# Patient Record
Sex: MALE | Race: WHITE | NOT HISPANIC OR LATINO | Employment: UNEMPLOYED | URBAN - METROPOLITAN AREA
[De-identification: names, ages, dates, MRNs, and addresses within clinical notes are randomized per-mention and may not be internally consistent; named-entity substitution may affect disease eponyms.]

---

## 2020-01-23 ENCOUNTER — TELEPHONE (OUTPATIENT)
Dept: CARDIOLOGY | Facility: CLINIC | Age: 40
End: 2020-01-23

## 2020-01-23 ENCOUNTER — OFFICE VISIT (OUTPATIENT)
Dept: CARDIOLOGY | Facility: CLINIC | Age: 40
End: 2020-01-23

## 2020-01-23 VITALS
BODY MASS INDEX: 31.5 KG/M2 | HEART RATE: 58 BPM | DIASTOLIC BLOOD PRESSURE: 92 MMHG | WEIGHT: 225 LBS | SYSTOLIC BLOOD PRESSURE: 152 MMHG | HEIGHT: 71 IN

## 2020-01-23 DIAGNOSIS — I10 ESSENTIAL HYPERTENSION: ICD-10-CM

## 2020-01-23 DIAGNOSIS — R07.2 PRECORDIAL PAIN: Primary | ICD-10-CM

## 2020-01-23 PROCEDURE — 93000 ELECTROCARDIOGRAM COMPLETE: CPT | Performed by: INTERNAL MEDICINE

## 2020-01-23 PROCEDURE — 99204 OFFICE O/P NEW MOD 45 MIN: CPT | Performed by: INTERNAL MEDICINE

## 2020-01-23 RX ORDER — PREDNISONE 10 MG/1
10 TABLET ORAL
COMMUNITY
Start: 2020-01-22 | End: 2021-02-15

## 2020-01-23 RX ORDER — AZELASTINE 1 MG/ML
SPRAY, METERED NASAL
COMMUNITY
Start: 2020-01-22 | End: 2021-02-15

## 2020-01-23 RX ORDER — ASPIRIN 81 MG/1
81 TABLET ORAL DAILY
COMMUNITY
End: 2021-02-15

## 2020-01-23 RX ORDER — SUCRALFATE 1 G/1
1 TABLET ORAL 4 TIMES DAILY
COMMUNITY
Start: 2020-01-19 | End: 2021-01-18

## 2020-01-23 RX ORDER — NADOLOL 20 MG/1
20 TABLET ORAL DAILY
COMMUNITY
Start: 2020-01-22 | End: 2021-03-19 | Stop reason: SDUPTHER

## 2020-01-23 RX ORDER — PANTOPRAZOLE SODIUM 40 MG/1
40 TABLET, DELAYED RELEASE ORAL DAILY
COMMUNITY
Start: 2020-01-19 | End: 2021-02-15

## 2020-01-23 RX ORDER — OXYMETAZOLINE HYDROCHLORIDE 0.05 G/100ML
2 SPRAY NASAL 2 TIMES DAILY
COMMUNITY
End: 2021-02-15

## 2020-01-23 NOTE — PROGRESS NOTES
Date of Office Visit: 2020  Encounter Provider: Harriet Chin MD  Place of Service: [facility] CARDIOLOGY  Patient Name: Arsenio Beard  :1980    Chief complaint  Consult requested by Dr. Phillips for evaluation of chest pain, shortness of breath.    History of Present Illness  Patient is a 39-year-old gentleman history of hypertension, GE reflux disease.  He has no known cardiac disease in fact has had had any cardiac care though had been incarcerated for 13 years until recently.  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 he was seen at [facility] emergency room on  with midsternal chest discomfort which was radiating to his neck associated with right arm numbness.  He was hypertensive at the time.  He describes constant chest pain, right arm pain and radiation to his neck for 3 days prior to admission.  The left arm pain had occurred after he lifted some activity at objects.  D-dimer was negative.  It was not felt his pain is cardiac in nature more likely GI in onset and was given Carafate and omeprazole.  He was subsequently sent home and saw Dr. Phillips yesterday and felt to have migraine headaches for which she was given steroids as well as Corgard.  He also complained of chest pain which is felt to be atypical and subsequent for further evaluation.  He states that the chest pain has improved and only intermittently worse with activity.  His right arm pain and paresthesias have improved slightly.      Past Medical History:   Diagnosis Date   • Atypical chest pain    • GERD (gastroesophageal reflux disease)    • Hypertension      History reviewed. No pertinent surgical history.  Outpatient Medications Prior to Visit   Medication Sig Dispense Refill   • aspirin 81 MG EC tablet Take 162 mg by mouth Daily.     • azelastine (ASTELIN) 0.1 % nasal spray      • nadolol (CORGARD) 20 MG tablet Take 20 mg by mouth Daily.     •  oxymetazoline (AFRIN) 0.05 % nasal spray 2 sprays into the nostril(s) as directed by provider 2 (Two) Times a Day.     • pantoprazole (PROTONIX) 40 MG EC tablet Take 40 mg by mouth Daily.     • predniSONE (DELTASONE) 10 MG tablet Take 10 mg by mouth 4 (Four) Times a Day.     • sucralfate (CARAFATE) 1 g tablet Take 1 g by mouth 4 (Four) Times a Day.       No facility-administered medications prior to visit.        Allergies as of 01/23/2020   • (No Known Allergies)     Social History     Socioeconomic History   • Marital status: Single     Spouse name: Not on file   • Number of children: Not on file   • Years of education: Not on file   • Highest education level: Not on file   Tobacco Use   • Smoking status: Never Smoker   • Smokeless tobacco: Never Used     Family History   Problem Relation Age of Onset   • Heart disease Maternal Grandmother    • Heart attack Maternal Grandmother      Review of Systems   Constitution: Positive for malaise/fatigue. Negative for fever, weight gain and weight loss.   HENT: Negative for ear pain, hearing loss, nosebleeds and sore throat.    Eyes: Negative for double vision, pain, vision loss in left eye and vision loss in right eye.   Cardiovascular:        See history of present illness.   Respiratory: Positive for shortness of breath. Negative for cough, sleep disturbances due to breathing, snoring and wheezing.    Endocrine: Negative for cold intolerance, heat intolerance and polyuria.   Skin: Negative for itching, poor wound healing and rash.   Musculoskeletal: Negative for joint pain, joint swelling and myalgias.   Gastrointestinal: Negative for abdominal pain, diarrhea, hematochezia, nausea and vomiting.   Genitourinary: Negative for hematuria and hesitancy.   Neurological: Positive for excessive daytime sleepiness and light-headedness. Negative for numbness, paresthesias and seizures.   Psychiatric/Behavioral: Negative for depression. The patient is not nervous/anxious.      "    Objective:     Vitals:    01/23/20 1024 01/23/20 1025   BP: 150/92 152/92   BP Location: Right arm Left arm   Pulse: 58    Weight: 102 kg (225 lb)    Height: 180.3 cm (71\")      Body mass index is 31.38 kg/m².    Physical Exam   Constitutional: He is oriented to person, place, and time. He appears well-developed and well-nourished.   Obese   HENT:   Head: Normocephalic.   Nose: Nose normal.   Mouth/Throat: Oropharynx is clear and moist.   Eyes: Pupils are equal, round, and reactive to light. Conjunctivae and EOM are normal. Right eye exhibits no discharge. No scleral icterus.   Neck: Normal range of motion. Neck supple. No JVD present. No thyromegaly present.   Cardiovascular: Normal rate, regular rhythm, normal heart sounds and intact distal pulses. Exam reveals no gallop and no friction rub.   No murmur heard.  Pulses:       Carotid pulses are 2+ on the right side, and 2+ on the left side.       Radial pulses are 2+ on the right side, and 2+ on the left side.        Femoral pulses are 0 on the right side, and 0 on the left side.       Popliteal pulses are 2+ on the right side, and 2+ on the left side.        Dorsalis pedis pulses are 2+ on the right side, and 2+ on the left side.        Posterior tibial pulses are 2+ on the right side, and 2+ on the left side.   Pulmonary/Chest: Effort normal and breath sounds normal. No respiratory distress. He has no wheezes. He has no rales.   Abdominal: Soft. Bowel sounds are normal. He exhibits no distension. There is no hepatosplenomegaly. There is no tenderness. There is no rebound.   Musculoskeletal: Normal range of motion. He exhibits no edema or tenderness.   Neurological: He is alert and oriented to person, place, and time.   Skin: Skin is warm and dry. No rash noted. No erythema.   Psychiatric: He has a normal mood and affect. His behavior is normal. Judgment and thought content normal.   Vitals reviewed.    Lab Review:     ECG 12 Lead  Date/Time: 1/23/2020 10:26 " AM  Performed by: Harriet Chin MD  Authorized by: Harriet Chin MD   Comparison: not compared with previous ECG   Rhythm: sinus rhythm  Conduction: 1st degree AV block    Clinical impression: abnormal EKG          Assessment:       Diagnosis Plan   1. Precordial pain  ECG 12 Lead    Treadmill Stress Test    XR Chest 2 View   2. Essential hypertension       Plan:       1.  Chest pain.  Fairly constant and more likely musculoskeletal in nature.  Once blood pressure is better controlled we will have him return for treadmill stress test.  Will also check a chest x-ray.  He also consumes large amounts of caffeinated beverages and I strongly urged him to discontinue this.  Certainly this clinically hypertension and esophagitis  2.  Right arm pain.  Likely radicular in nature.  Have him follow-up with Dr. Phillips in regards to this.  May need further neurologic evaluation.  3.  Hypertension.  He has not been following a low-salt diet.  I reviewed this with him.  He will pursue this and start a regular exercise regimen after stress test has been completed and is hopefully normal.  In addition I told him to change his diet significantly certainly the 3 L of Mountain Dew a day is contributing.  Once his blood pressure control plan a Trimox stress test next week.  4.  Headaches/migraines.  Recommendations per Dr. Phillips       Your medication list           Accurate as of January 23, 2020 11:59 PM. If you have any questions, ask your nurse or doctor.               CONTINUE taking these medications      Instructions Last Dose Given Next Dose Due   aspirin 81 MG EC tablet      Take 162 mg by mouth Daily.       azelastine 0.1 % nasal spray  Commonly known as:  ASTELIN           nadolol 20 MG tablet  Commonly known as:  CORGARD      Take 20 mg by mouth Daily.       oxymetazoline 0.05 % nasal spray  Commonly known as:  AFRIN      2 sprays into the nostril(s) as directed by provider 2 (Two) Times a Day.       pantoprazole 40 MG EC  tablet  Commonly known as:  PROTONIX      Take 40 mg by mouth Daily.       predniSONE 10 MG tablet  Commonly known as:  DELTASONE      Take 10 mg by mouth 4 (Four) Times a Day.       sucralfate 1 g tablet  Commonly known as:  CARAFATE      Take 1 g by mouth 4 (Four) Times a Day.              Patient is no longer taking -.  I corrected the med list to reflect this.  I did not stop these medications.    Dictated utilizing Dragon dictation

## 2020-01-23 NOTE — TELEPHONE ENCOUNTER
These let patient know I did want him to get a chest x-ray as we discussed.  It was not done during the recent ER visit. ten

## 2020-01-26 PROBLEM — I10 ESSENTIAL HYPERTENSION: Status: ACTIVE | Noted: 2020-01-26

## 2020-01-26 PROBLEM — R07.2 PRECORDIAL PAIN: Status: ACTIVE | Noted: 2020-01-26

## 2020-01-29 ENCOUNTER — HOSPITAL ENCOUNTER (OUTPATIENT)
Dept: CARDIOLOGY | Facility: HOSPITAL | Age: 40
Discharge: HOME | End: 2020-01-29
Admitting: INTERNAL MEDICINE

## 2020-01-29 ENCOUNTER — HOSPITAL ENCOUNTER (OUTPATIENT)
Dept: GENERAL RADIOLOGY | Facility: HOSPITAL | Age: 40
Discharge: HOME | End: 2020-01-29

## 2020-01-29 DIAGNOSIS — R07.2 PRECORDIAL PAIN: ICD-10-CM

## 2020-01-29 LAB
BH CV STRESS BP STAGE 1: NORMAL
BH CV STRESS BP STAGE 2: NORMAL
BH CV STRESS BP STAGE 3: NORMAL
BH CV STRESS BP STAGE 4: NORMAL
BH CV STRESS DURATION MIN STAGE 1: 3
BH CV STRESS DURATION MIN STAGE 2: 3
BH CV STRESS DURATION MIN STAGE 3: 3
BH CV STRESS DURATION MIN STAGE 4: 3
BH CV STRESS DURATION MIN STAGE 5: 1
BH CV STRESS DURATION SEC STAGE 1: 0
BH CV STRESS DURATION SEC STAGE 2: 0
BH CV STRESS DURATION SEC STAGE 3: 0
BH CV STRESS DURATION SEC STAGE 4: 0
BH CV STRESS DURATION SEC STAGE 5: 30
BH CV STRESS GRADE STAGE 1: 10
BH CV STRESS GRADE STAGE 2: 12
BH CV STRESS GRADE STAGE 3: 14
BH CV STRESS GRADE STAGE 4: 16
BH CV STRESS GRADE STAGE 5: 18
BH CV STRESS HR STAGE 1: 94
BH CV STRESS HR STAGE 2: 107
BH CV STRESS HR STAGE 3: 121
BH CV STRESS HR STAGE 4: 145
BH CV STRESS HR STAGE 5: 154
BH CV STRESS METS STAGE 1: 5
BH CV STRESS METS STAGE 2: 7.5
BH CV STRESS METS STAGE 3: 10
BH CV STRESS METS STAGE 4: 13.5
BH CV STRESS METS STAGE 5: 15
BH CV STRESS PROTOCOL 1: NORMAL
BH CV STRESS RECOVERY BP: NORMAL MMHG
BH CV STRESS RECOVERY HR: 94 BPM
BH CV STRESS SPEED STAGE 1: 1.7
BH CV STRESS SPEED STAGE 2: 2.5
BH CV STRESS SPEED STAGE 3: 3.4
BH CV STRESS SPEED STAGE 4: 4.2
BH CV STRESS SPEED STAGE 5: 5
BH CV STRESS STAGE 1: 1
BH CV STRESS STAGE 2: 2
BH CV STRESS STAGE 3: 3
BH CV STRESS STAGE 4: 4
BH CV STRESS STAGE 5: 5
MAXIMAL PREDICTED HEART RATE: 179 BPM
PERCENT MAX PREDICTED HR: 86.03 %
STRESS BASELINE BP: NORMAL MMHG
STRESS BASELINE HR: 63 BPM
STRESS PERCENT HR: 101 %
STRESS POST ESTIMATED WORKLOAD: 14 METS
STRESS POST EXERCISE DUR MIN: 13 MIN
STRESS POST EXERCISE DUR SEC: 30 SEC
STRESS POST PEAK BP: NORMAL MMHG
STRESS POST PEAK HR: 154 BPM
STRESS TARGET HR: 152 BPM

## 2020-01-29 PROCEDURE — 93017 CV STRESS TEST TRACING ONLY: CPT

## 2020-01-29 PROCEDURE — 93018 CV STRESS TEST I&R ONLY: CPT | Performed by: INTERNAL MEDICINE

## 2020-01-29 PROCEDURE — 71046 X-RAY EXAM CHEST 2 VIEWS: CPT

## 2020-01-29 PROCEDURE — 93016 CV STRESS TEST SUPVJ ONLY: CPT | Performed by: INTERNAL MEDICINE

## 2020-01-30 ENCOUNTER — TELEPHONE (OUTPATIENT)
Dept: CARDIOLOGY | Facility: CLINIC | Age: 40
End: 2020-01-30

## 2020-01-31 ENCOUNTER — PATIENT MESSAGE (OUTPATIENT)
Dept: CARDIOLOGY | Facility: CLINIC | Age: 40
End: 2020-01-31

## 2020-01-31 DIAGNOSIS — Z51.81 THERAPEUTIC DRUG MONITORING: ICD-10-CM

## 2020-01-31 DIAGNOSIS — I10 ESSENTIAL HYPERTENSION: Primary | ICD-10-CM

## 2020-01-31 NOTE — TELEPHONE ENCOUNTER
Notified patient of results and recommendations. He verbalized understanding. He is going to send his B/P readings in through [employer]. I sent him the email sign up link.    Mayra Lopez RN  Triage [facility] [place]

## 2020-01-31 NOTE — TELEPHONE ENCOUNTER
Please let the patient know x-ray and stress test are normal.  And have him see PCP regarding alternative possible GI etiology of chest pain.  Call if blood pressure remains greater than 130/80 mmHg . ten

## 2020-02-04 ENCOUNTER — TELEPHONE (OUTPATIENT)
Dept: CARDIOLOGY | Facility: CLINIC | Age: 40
End: 2020-02-04

## 2020-02-04 RX ORDER — LOSARTAN POTASSIUM 25 MG/1
25 TABLET ORAL DAILY
Qty: 90 TABLET | Refills: 1 | Status: SHIPPED | OUTPATIENT
Start: 2020-02-04 | End: 2020-02-05

## 2020-02-04 NOTE — TELEPHONE ENCOUNTER
Usually hypertension is essential hypertension which is from the arteries just getting stiffer with proteins in the vessel walls changing over time.  Of course not exercising, consuming a lot of caffeine and eating a lot of salt will also be big players and causing hypertension.  He needs to modify all of this.  There is also a genetic predisposition.  Sometimes there is also sleep apnea and other secondary causes.  At this point he needs to make lifestyle changes with diet and exercise.  If he is snoring may need to consider sleep study.  Please see if he snores has daytime sleepiness or nightmares or night terrors.  In which case will need a sleep study.  At this point, have him start losartan 25 mg a day in addition to his current regimen.  Check a BMP in 1 week.

## 2020-02-04 NOTE — TELEPHONE ENCOUNTER
Sent via [employer].  Rx sent and note to pt to verify if he wants to have lab at the [facility].  Pt has transportation issues.

## 2020-02-05 ENCOUNTER — TELEPHONE (OUTPATIENT)
Dept: CARDIOLOGY | Facility: CLINIC | Age: 40
End: 2020-02-05

## 2020-02-05 RX ORDER — LOSARTAN POTASSIUM 25 MG/1
25 TABLET ORAL DAILY
Qty: 30 TABLET | Refills: 1 | Status: SHIPPED | OUTPATIENT
Start: 2020-02-05 | End: 2021-02-15 | Stop reason: ALTCHOICE

## 2020-02-05 NOTE — TELEPHONE ENCOUNTER
Spoke with pt. Let him know that I sent his losartan to the other pharmacy. Went over indications for losartan. Pt teaching completed about lifestyle changes that can help lower B/P. He verbalized understanding.    Thank you!    Mayra Lopez RN  [place] [facility]

## 2020-02-05 NOTE — TELEPHONE ENCOUNTER
Pt called and left voicemail to have his losartan sent to another pharmacy. He would like this sent to the [facility] in [facility]. He said he would be picking up Saturday    He can be reached at [phone] if there are any questions    Thanks   SW

## 2020-02-06 DIAGNOSIS — I10 ESSENTIAL HYPERTENSION: Primary | ICD-10-CM

## 2020-02-06 DIAGNOSIS — R06.83 SNORING: ICD-10-CM

## 2020-02-06 DIAGNOSIS — R40.0 DAYTIME SOMNOLENCE: ICD-10-CM

## 2020-02-06 NOTE — TELEPHONE ENCOUNTER
I think given this information best if he check for sleep apnea especially as sleep apnea can only cause hypertension but also migraine headaches, and if he has it treating sleep apnea will help with both.  Order was placed for home sleep study.  Please let him know this and arrange. ten

## 2020-02-28 ENCOUNTER — HOSPITAL ENCOUNTER (OUTPATIENT)
Dept: SLEEP MEDICINE | Facility: HOSPITAL | Age: 40
Discharge: HOME | End: 2020-02-28
Admitting: INTERNAL MEDICINE

## 2020-02-28 PROCEDURE — 95806 SLEEP STUDY UNATT&RESP EFFT: CPT

## 2020-02-28 PROCEDURE — 95806 SLEEP STUDY UNATT&RESP EFFT: CPT | Performed by: INTERNAL MEDICINE

## 2020-03-09 ENCOUNTER — TELEPHONE (OUTPATIENT)
Dept: SLEEP MEDICINE | Facility: HOSPITAL | Age: 40
End: 2020-03-09

## 2020-03-09 NOTE — TELEPHONE ENCOUNTER
Called to let pt know he has no karishma and to f/u with cardiology or Dr. Phillips per Dr. Kennedy. CV

## 2020-03-12 ENCOUNTER — TELEPHONE (OUTPATIENT)
Dept: CARDIOLOGY | Facility: CLINIC | Age: 40
End: 2020-03-12

## 2020-03-16 NOTE — TELEPHONE ENCOUNTER
1120 Reviewed Sleep Study results with patient.  DWAINE Araujo RN  Called patient this am, left message for patient to return call.  Quin Araujo RN.

## 2020-05-07 ENCOUNTER — TELEPHONE (OUTPATIENT)
Dept: CARDIOLOGY | Facility: CLINIC | Age: 40
End: 2020-05-07

## 2020-06-10 NOTE — TELEPHONE ENCOUNTER
This is the response I got back from the pt.     I greatly appreciate your help, patience, kindness, and care. Since you guys prescribed me the medications you did I have not refilled them. I took them until they ran out (30 day supply). I couldn’t get them refilled so I quit taking them.     My primary care doctor has not prescribed them. However, he has me on nadol, Flonase, and a muscle relaxer for my back (generic Flexeril). If you deem it important to take the pills you prescribed then I will need to you to prescribe them and re-prescribe the as often as need be.   Again. Thank you for your help, love, kindness, care, and patience with me.       Is there any information you want me to get other then his vitals?

## 2020-08-09 ENCOUNTER — PATIENT MESSAGE (OUTPATIENT)
Dept: CARDIOLOGY | Facility: CLINIC | Age: 40
End: 2020-08-09

## 2020-08-10 ENCOUNTER — TELEPHONE (OUTPATIENT)
Dept: CARDIOLOGY | Facility: CLINIC | Age: 40
End: 2020-08-10

## 2020-08-10 NOTE — TELEPHONE ENCOUNTER
----- Message from Arsenio Chirag sent at 8/9/2020  5:03 PM EDT -----  Regarding: Test Results Question  Contact: [phone]  So at this point what are you thinking??? I need to lose weight and stop drinking so much caffeine. However, what is the root of the high blood pressure issue???    That said I was supposed to see a GI specialist but they canceled due to the covid. They said they would reschedule when the open again. So I KNOW there are GI issue that need to be addressed and have worsened since I did the treadmill test.     What do these labs show??? What are your thoughts at this time???? Do you need me to schedule an appointment to come in for a visit??? If so give me the options of dates and times and I will come in.    Thank you for your help, time and love of [place] you are showing me!!! I greatly look forward to hearing from you.

## 2020-08-10 NOTE — TELEPHONE ENCOUNTER
Please let him know that the kidney tests in May were fine.  Looks like both Carol and Anne have been trying to reach him to arrange an appointment with Mayra as I had previously recommended.  Please have him come in this week to see Mayra to address hypertension further.  Yes weight loss and avoidance of alcohol and caffeine will help hypertension which most of the time is genetic and idiopathic.  Other factors include sleep apnea.  There are also secondary causes but these are much less common.  He needs to be evaluated.  Please have him come in this week

## 2020-08-11 NOTE — TELEPHONE ENCOUNTER
Sent via [employer].    Scheduling- Please call pt to get him for appt with ANURADHA Nunez this week.

## 2020-08-13 ENCOUNTER — TELEPHONE (OUTPATIENT)
Dept: CARDIOLOGY | Facility: CLINIC | Age: 40
End: 2020-08-13

## 2021-02-15 ENCOUNTER — OFFICE VISIT (OUTPATIENT)
Dept: SURGERY | Facility: CLINIC | Age: 41
End: 2021-02-15

## 2021-02-15 VITALS — WEIGHT: 245 LBS | HEIGHT: 72 IN | BODY MASS INDEX: 33.18 KG/M2

## 2021-02-15 DIAGNOSIS — K40.90 RIGHT INGUINAL HERNIA: Primary | ICD-10-CM

## 2021-02-15 PROCEDURE — 99203 OFFICE O/P NEW LOW 30 MIN: CPT | Performed by: SURGERY

## 2021-02-15 RX ORDER — FLUTICASONE PROPIONATE 50 MCG
2 SPRAY, SUSPENSION (ML) NASAL DAILY
COMMUNITY

## 2021-02-15 RX ORDER — CEFAZOLIN SODIUM 2 G/100ML
2 INJECTION, SOLUTION INTRAVENOUS ONCE
Status: CANCELLED | OUTPATIENT
Start: 2021-02-23 | End: 2021-02-15

## 2021-02-15 RX ORDER — SODIUM CHLORIDE 0.9 % (FLUSH) 0.9 %
3 SYRINGE (ML) INJECTION EVERY 12 HOURS SCHEDULED
Status: CANCELLED | OUTPATIENT
Start: 2021-02-23

## 2021-02-15 RX ORDER — SODIUM CHLORIDE 0.9 % (FLUSH) 0.9 %
10 SYRINGE (ML) INJECTION AS NEEDED
Status: CANCELLED | OUTPATIENT
Start: 2021-02-23

## 2021-02-15 RX ORDER — OMEPRAZOLE 20 MG/1
40 CAPSULE, DELAYED RELEASE ORAL DAILY
COMMUNITY
End: 2021-09-02

## 2021-02-15 RX ORDER — METHYLPREDNISOLONE 4 MG/1
4 TABLET ORAL DAILY
COMMUNITY
End: 2021-03-19

## 2021-02-15 RX ORDER — IBUPROFEN 600 MG/1
600 TABLET ORAL EVERY 8 HOURS PRN
COMMUNITY
End: 2021-09-02

## 2021-02-15 RX ORDER — METHOCARBAMOL 750 MG/1
750 TABLET, FILM COATED ORAL EVERY 12 HOURS
COMMUNITY
End: 2021-02-19

## 2021-02-15 NOTE — PROGRESS NOTES
Cc: Right groin pain    History of presenting illness:   This is a very nice, generally healthy 40-year-old gentleman who says that around 2 weeks ago he was lifting a heavy object at work and had the sudden onset of some back pain as well as right groin and testicle pain.  The pain has been persistent since that time, but is worse when he is on his feet or straining.  There has been no associated urinary or bowel changes.  No nausea or vomiting.  The pain radiates into his testicle and thigh.    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    Family History: Negative for known colorectal cancer    Review of Systems:  Constitutional: Negative for fever, chills, change in weight  Neck: no swollen glands or dysphagia or odynophagia  Respiratory: negative for SOB, cough, hemoptysis or wheezing  Cardiovascular: negative for chest pain, palpitations or peripheral edema  Gastrointestinal: Positive for right groin pain, negative for constipation, positive for reflux      Physical Exam:  BMI: Body mass index 33.7  General: alert and oriented, appropriate, no acute distress  Eyes: No scleral icterus, extraocular movements are intact  Neck: Supple without lymphadenopathy or thyromegaly, trachea is in the midline  Respiratory: There is good bilateral chest expansion, no use of accessory muscles is noted  Cardiovascular: No jugular venous distention or peripheral edema is seen  Gastrointestinal: Soft, benign, no ventral or umbilical hernia is felt.  No guarding or rebound.  Genitourinary: Normal male external genitalia with testes descended bilaterally.  In the right groin there is tenderness to palpation.  With examination of the external  inguinal ring there is a bulge with straining, easily reducible.  Left groin has a normal exam.    Laboratory data: No recent relevant data    Imaging data: No recent relevant data      Assessment and plan:   -Right groin pain and right inguinal hernia.  Suspect indirect.  No left inguinal hernia is felt.  I have recommended proceeding with laparoscopic da Vanessa robot-assisted right inguinal hernia repair with mesh.    Risks associated with the procedure are noted to include, but not be limited to, bleeding, infection, injury to small or large intestine, major vascular structures, the bladder or ureters.  Possibility of postoperative inguinodynia, mesh migration or infection, hernia recurrence and seroma formation also discussed.      Hiram cOampo MD, FACS  General, Minimally Invasive and Endoscopic Surgery  [facility]    [address], [address]  [place], [place], [address]  P: [phone]  F: [phone]

## 2021-02-17 ENCOUNTER — TRANSCRIBE ORDERS (OUTPATIENT)
Dept: PREADMISSION TESTING | Facility: HOSPITAL | Age: 41
End: 2021-02-17

## 2021-02-17 DIAGNOSIS — Z01.818 OTHER SPECIFIED PRE-OPERATIVE EXAMINATION: Primary | ICD-10-CM

## 2021-02-17 PROBLEM — K40.90 RIGHT INGUINAL HERNIA: Status: ACTIVE | Noted: 2021-02-17

## 2021-02-19 ENCOUNTER — PRE-ADMISSION TESTING (OUTPATIENT)
Dept: PREADMISSION TESTING | Facility: HOSPITAL | Age: 41
End: 2021-02-19

## 2021-02-19 VITALS
RESPIRATION RATE: 16 BRPM | SYSTOLIC BLOOD PRESSURE: 142 MMHG | OXYGEN SATURATION: 98 % | DIASTOLIC BLOOD PRESSURE: 84 MMHG | WEIGHT: 238 LBS | TEMPERATURE: 97 F | HEIGHT: 72 IN | HEART RATE: 74 BPM | BODY MASS INDEX: 32.23 KG/M2

## 2021-02-19 DIAGNOSIS — K40.90 RIGHT INGUINAL HERNIA: ICD-10-CM

## 2021-02-19 LAB
ANION GAP SERPL CALCULATED.3IONS-SCNC: 8.6 MMOL/L (ref 5–15)
BASOPHILS # BLD AUTO: 0.04 10*3/MM3 (ref 0–0.2)
BASOPHILS NFR BLD AUTO: 0.4 % (ref 0–1.5)
BUN SERPL-MCNC: 20 MG/DL (ref 6–20)
BUN/CREAT SERPL: 18.9 (ref 7–25)
CALCIUM SPEC-SCNC: 8.9 MG/DL (ref 8.6–10.5)
CHLORIDE SERPL-SCNC: 104 MMOL/L (ref 98–107)
CO2 SERPL-SCNC: 27.4 MMOL/L (ref 22–29)
CREAT SERPL-MCNC: 1.06 MG/DL (ref 0.76–1.27)
DEPRECATED RDW RBC AUTO: 41.5 FL (ref 37–54)
EOSINOPHIL # BLD AUTO: 0.17 10*3/MM3 (ref 0–0.4)
EOSINOPHIL NFR BLD AUTO: 1.7 % (ref 0.3–6.2)
ERYTHROCYTE [DISTWIDTH] IN BLOOD BY AUTOMATED COUNT: 12.8 % (ref 12.3–15.4)
GFR SERPL CREATININE-BSD FRML MDRD: 77 ML/MIN/1.73
GLUCOSE SERPL-MCNC: 143 MG/DL (ref 65–99)
HCT VFR BLD AUTO: 44.5 % (ref 37.5–51)
HGB BLD-MCNC: 15.5 G/DL (ref 13–17.7)
IMM GRANULOCYTES # BLD AUTO: 0.1 10*3/MM3 (ref 0–0.05)
IMM GRANULOCYTES NFR BLD AUTO: 1 % (ref 0–0.5)
LYMPHOCYTES # BLD AUTO: 2.51 10*3/MM3 (ref 0.7–3.1)
LYMPHOCYTES NFR BLD AUTO: 24.8 % (ref 19.6–45.3)
MCH RBC QN AUTO: 31.1 PG (ref 26.6–33)
MCHC RBC AUTO-ENTMCNC: 34.8 G/DL (ref 31.5–35.7)
MCV RBC AUTO: 89.2 FL (ref 79–97)
MONOCYTES # BLD AUTO: 0.78 10*3/MM3 (ref 0.1–0.9)
MONOCYTES NFR BLD AUTO: 7.7 % (ref 5–12)
NEUTROPHILS NFR BLD AUTO: 6.51 10*3/MM3 (ref 1.7–7)
NEUTROPHILS NFR BLD AUTO: 64.4 % (ref 42.7–76)
NRBC BLD AUTO-RTO: 0.1 /100 WBC (ref 0–0.2)
PLATELET # BLD AUTO: 224 10*3/MM3 (ref 140–450)
PMV BLD AUTO: 9.6 FL (ref 6–12)
POTASSIUM SERPL-SCNC: 3.7 MMOL/L (ref 3.5–5.2)
QT INTERVAL: 406 MS
RBC # BLD AUTO: 4.99 10*6/MM3 (ref 4.14–5.8)
SODIUM SERPL-SCNC: 140 MMOL/L (ref 136–145)
WBC # BLD AUTO: 10.11 10*3/MM3 (ref 3.4–10.8)

## 2021-02-19 PROCEDURE — 93010 ELECTROCARDIOGRAM REPORT: CPT | Performed by: INTERNAL MEDICINE

## 2021-02-19 PROCEDURE — 93005 ELECTROCARDIOGRAM TRACING: CPT

## 2021-02-19 PROCEDURE — 85025 COMPLETE CBC W/AUTO DIFF WBC: CPT

## 2021-02-19 PROCEDURE — 80048 BASIC METABOLIC PNL TOTAL CA: CPT

## 2021-02-19 PROCEDURE — 36415 COLL VENOUS BLD VENIPUNCTURE: CPT

## 2021-02-19 RX ORDER — METHOCARBAMOL 750 MG/1
750 TABLET, FILM COATED ORAL 2 TIMES DAILY PRN
COMMUNITY
End: 2021-09-02

## 2021-02-19 NOTE — DISCHARGE INSTRUCTIONS
Arrive to hospital on your day of surgery at 8AM ON 2-23-21    Take the following medications the morning of surgery:  OMEPRAZOLE AND NADOLOL      If you are on prescription narcotic pain medication to control your pain you may also take that medication the morning of surgery.    General Instructions:  • Do not eat solid food after midnight the night before surgery.  • You may drink clear liquids day of surgery but must stop at least one hour before your hospital arrival time.  • It is beneficial for you to have a clear drink that contains carbohydrates the day of surgery.  We suggest a 12 to 20 ounce bottle of Gatorade or Powerade for non-diabetic patients or a 12 to 20 ounce bottle of G2 or Powerade Zero for diabetic patients. (Pediatric patients, are not advised to drink a 12 to 20 ounce carbohydrate drink)    Clear liquids are liquids you can see through.  Nothing red in color.     Plain water                               Sports drinks  Sodas                                   Gelatin (Jell-O)  Fruit juices without pulp such as white grape juice and apple juice  Popsicles that contain no fruit or yogurt  Tea or coffee (no cream or milk added)  Gatorade / Powerade  G2 / Powerade Zero    • Infants may have breast milk up to four hours before surgery.  • Infants drinking formula may drink formula up to six hours before surgery.   • Patients who avoid smoking, chewing tobacco and alcohol for 4 weeks prior to surgery have a reduced risk of post-operative complications.  Quit smoking as many days before surgery as you can.  • Do not smoke, use chewing tobacco or drink alcohol the day of surgery.   • If applicable bring your C-PAP/ BI-PAP machine.  • Bring any papers given to you in the doctor’s office.  • Wear clean comfortable clothes.  • Do not wear contact lenses, false eyelashes or make-up.  Bring a case for your glasses.   • Bring crutches or walker if applicable.  • Remove all piercings.  Leave jewelry and any  other valuables at home.  • Hair extensions with metal clips must be removed prior to surgery.  • The Pre-Admission Testing nurse will instruct you to bring medications if unable to obtain an accurate list in Pre-Admission Testing.        If you were given a blood bank ID arm band remember to bring it with you the day of surgery.    Preventing a Surgical Site Infection:  • For 2 to 3 days before surgery, avoid shaving with a razor because the razor can irritate skin and make it easier to develop an infection.    • Any areas of open skin can increase the risk of a post-operative wound infection by allowing bacteria to enter and travel throughout the body.  Notify your surgeon if you have any skin wounds / rashes even if it is not near the expected surgical site.  The area will need assessed to determine if surgery should be delayed until it is healed.  • The night prior to surgery shower using a fresh bar of anti-bacterial soap (such as Dial) and clean washcloth.  Sleep in a clean bed with clean clothing.  Do not allow pets to sleep with you.  • Shower on the morning of surgery using a fresh bar of anti-bacterial soap (such as Dial) and clean washcloth.  Dry with a clean towel and dress in clean clothing.  • Ask your surgeon if you will be receiving antibiotics prior to surgery.  • Make sure you, your family, and all healthcare providers clean their hands with soap and water or an alcohol based hand  before caring for you or your wound.    Day of surgery:  Your arrival time is approximately two hours before your scheduled surgery time.  Upon arrival, a Pre-op nurse and Anesthesiologist will review your health history, obtain vital signs, and answer questions you may have.  The only belongings needed at this time will be a list of your home medications and if applicable your C-PAP/BI-PAP machine.  A Pre-op nurse will start an IV and you may receive medication in preparation for surgery, including something to  help you relax.     Please be aware that surgery does come with discomfort.  We want to make every effort to control your discomfort so please discuss any uncontrolled symptoms with your nurse.   Your doctor will most likely have prescribed pain medications.      If you are going home after surgery you will receive individualized written care instructions before being discharged.  A responsible adult must drive you to and from the hospital on the day of your surgery and stay with you for 24 hours.  Discharge prescriptions can be filled by the hospital pharmacy during regular pharmacy hours.  If you are having surgery late in the day/evening your prescription may be e-prescribed to your pharmacy.  Please verify your pharmacy hours or chose a 24 hour pharmacy to avoid not having access to your prescription because your pharmacy has closed for the day.    If you are staying overnight following surgery, you will be transported to your hospital room following the recovery period.  [facility] has all private rooms.    If you have any questions please call Pre-Admission Testing at [phone].  Deductibles and co-payments are collected on the day of service. Please be prepared to pay the required co-pay, deductible or deposit on the day of service as defined by your plan.    Patient Education for Self-Quarantine Process    Following your COVID testing, we strongly recommend that you do not leave your home after you have been tested for COVID except to get medical care. This includes not going to work, school or to public areas.  If this is not possible for you to do please limit your activities to only required outings.  Be sure to wear a mask when you are with other people, practice social distancing and wash your hands frequently.      The following items provide additional details to keep you safe.  • Wash your hands with soap and water frequently for at least 20 seconds.   • Avoid touching your eyes,  nose and mouth with unwashed hands.  • Do not share anything - utensils, towels, food from the same bowl.   • Have your own utensils, drinking glass, dishes, towels and bedding.   • Do not have visitors.   • Do use FaceTime to stay in touch with family and friends.  • You should stay in a specific room away from others if possible.   • Stay at least 6 feet away from others in the home if you cannot have a dedicated room to yourself.   • Do not snuggle with your pet. While the CDC says there is no evidence that pets can spread COVID-19 or be infected from humans, it is probably best to avoid “petting, snuggling, being kissed or licked and sharing food (during self-quarantine)”, according to the CDC.   • Sanitize household surfaces daily. Include all high touch areas (door handles, light switches, phones, countertops, etc.)  • Do not share a bathroom with others, if possible.   • Wear a mask around others in your home if you are unable to stay in a separate room or 6 feet apart. If  you are unable to wear a mask, have your family member wear a mask if they must be within 6 feet of you.   Call your surgeon immediately if you experience any of the following symptoms:  • Sore Throat  • Shortness of Breath or difficulty breathing  • Cough  • Chills  • Body soreness or muscle pain  • Headache  • Fever  • New loss of taste or smell  • Do not arrive for your surgery ill.  Your procedure will need to be rescheduled to another time.  You will need to call your physician before the day of surgery to avoid any unnecessary exposure to hospital staff as well as other patients.        CHLORHEXIDINE CLOTH INSTRUCTIONS  The morning of surgery follow these instructions using the Chlorhexidine cloths you've been given.  These steps reduce bacteria on the body.  Do not use the cloths near your eyes, ears mouth, genitalia or on open wounds.  Throw the cloths away after use but do not try to flush them down a toilet.      • Open and  remove one cloth at a time from the package.    • Leave the cloth unfolded and begin the bathing.  • Massage the skin with the cloths using gentle pressure to remove bacteria.  Do not scrub harshly.   • Follow the steps below with one 2% CHG cloth per area (6 total cloths).  • One cloth for neck, shoulders and chest.  • One cloth for both arms, hands, fingers and underarms (do underarms last).  • One cloth for the abdomen followed by groin.  • One cloth for right leg and foot including between the toes.  • One cloth for left leg and foot including between the toes.  • The last cloth is to be used for the back of the neck, back and buttocks.    Allow the CHG to air dry 3 minutes on the skin which will give it time to work and decrease the chance of irritation.  The skin may feel sticky until it is dry.  Do not rinse with water or any other liquid or you will lose the beneficial effects of the CHG.  If mild skin irritation occurs, do rinse the skin to remove the CHG.  Report this to the nurse at time of admission.  Do not apply lotions, creams, ointments, deodorants or perfumes after using the clothes. Dress in clean clothes before coming to the hospital.

## 2021-02-20 ENCOUNTER — LAB (OUTPATIENT)
Dept: LAB | Facility: HOSPITAL | Age: 41
End: 2021-02-20

## 2021-02-20 DIAGNOSIS — Z01.818 OTHER SPECIFIED PRE-OPERATIVE EXAMINATION: ICD-10-CM

## 2021-02-20 PROCEDURE — U0004 COV-19 TEST NON-CDC HGH THRU: HCPCS

## 2021-02-20 PROCEDURE — C9803 HOPD COVID-19 SPEC COLLECT: HCPCS

## 2021-02-22 LAB — SARS-COV-2 RNA RESP QL NAA+PROBE: NOT DETECTED

## 2021-02-23 ENCOUNTER — ANESTHESIA EVENT (OUTPATIENT)
Dept: PERIOP | Facility: HOSPITAL | Age: 41
End: 2021-02-23

## 2021-02-23 ENCOUNTER — ANESTHESIA (OUTPATIENT)
Dept: PERIOP | Facility: HOSPITAL | Age: 41
End: 2021-02-23

## 2021-02-23 ENCOUNTER — HOSPITAL ENCOUNTER (OUTPATIENT)
Facility: HOSPITAL | Age: 41
Setting detail: HOSPITAL OUTPATIENT SURGERY
Discharge: HOME | End: 2021-02-23
Attending: SURGERY | Admitting: SURGERY

## 2021-02-23 VITALS
SYSTOLIC BLOOD PRESSURE: 148 MMHG | BODY MASS INDEX: 33.6 KG/M2 | RESPIRATION RATE: 16 BRPM | WEIGHT: 240 LBS | HEART RATE: 73 BPM | DIASTOLIC BLOOD PRESSURE: 85 MMHG | HEIGHT: 71 IN | OXYGEN SATURATION: 96 % | TEMPERATURE: 98.1 F

## 2021-02-23 DIAGNOSIS — K40.90 RIGHT INGUINAL HERNIA: ICD-10-CM

## 2021-02-23 PROCEDURE — 25010000002 FENTANYL CITRATE (PF) 100 MCG/2ML SOLUTION: Performed by: ANESTHESIOLOGY

## 2021-02-23 PROCEDURE — 25010000002 HYDROMORPHONE PER 4 MG: Performed by: ANESTHESIOLOGY

## 2021-02-23 PROCEDURE — 25010000002 SUCCINYLCHOLINE PER 20 MG: Performed by: ANESTHESIOLOGY

## 2021-02-23 PROCEDURE — C1781 MESH (IMPLANTABLE): HCPCS | Performed by: SURGERY

## 2021-02-23 PROCEDURE — 25010000003 CEFAZOLIN IN DEXTROSE 2-4 GM/100ML-% SOLUTION: Performed by: SURGERY

## 2021-02-23 PROCEDURE — 49650 LAP ING HERNIA REPAIR INIT: CPT | Performed by: SURGERY

## 2021-02-23 PROCEDURE — 25010000002 PROPOFOL 10 MG/ML EMULSION: Performed by: ANESTHESIOLOGY

## 2021-02-23 PROCEDURE — 25010000002 ONDANSETRON PER 1 MG: Performed by: ANESTHESIOLOGY

## 2021-02-23 PROCEDURE — 49650 LAP ING HERNIA REPAIR INIT: CPT | Performed by: SPECIALIST/TECHNOLOGIST, OTHER

## 2021-02-23 DEVICE — 3DMAX™ MID ANATOMICAL MESH, LARGE, RIGHT, 4” X 6”, 10 X 16 CM
Type: IMPLANTABLE DEVICE | Site: ABDOMEN | Status: FUNCTIONAL
Brand: 3DMAX™ MID ANATOMICAL MESH

## 2021-02-23 RX ORDER — HYDROCODONE BITARTRATE AND ACETAMINOPHEN 7.5; 325 MG/1; MG/1
1 TABLET ORAL ONCE AS NEEDED
Status: COMPLETED | OUTPATIENT
Start: 2021-02-23 | End: 2021-02-23

## 2021-02-23 RX ORDER — ROCURONIUM BROMIDE 10 MG/ML
INJECTION, SOLUTION INTRAVENOUS AS NEEDED
Status: DISCONTINUED | OUTPATIENT
Start: 2021-02-23 | End: 2021-02-23 | Stop reason: SURG

## 2021-02-23 RX ORDER — HYDROMORPHONE HYDROCHLORIDE 1 MG/ML
0.5 INJECTION, SOLUTION INTRAMUSCULAR; INTRAVENOUS; SUBCUTANEOUS
Status: DISCONTINUED | OUTPATIENT
Start: 2021-02-23 | End: 2021-02-23 | Stop reason: HOSPADM

## 2021-02-23 RX ORDER — NALOXONE HCL 0.4 MG/ML
0.2 VIAL (ML) INJECTION AS NEEDED
Status: DISCONTINUED | OUTPATIENT
Start: 2021-02-23 | End: 2021-02-23 | Stop reason: HOSPADM

## 2021-02-23 RX ORDER — HYDRALAZINE HYDROCHLORIDE 20 MG/ML
5 INJECTION INTRAMUSCULAR; INTRAVENOUS
Status: DISCONTINUED | OUTPATIENT
Start: 2021-02-23 | End: 2021-02-23 | Stop reason: HOSPADM

## 2021-02-23 RX ORDER — MIDAZOLAM HYDROCHLORIDE 1 MG/ML
1 INJECTION INTRAMUSCULAR; INTRAVENOUS
Status: DISCONTINUED | OUTPATIENT
Start: 2021-02-23 | End: 2021-02-23 | Stop reason: HOSPADM

## 2021-02-23 RX ORDER — PROPOFOL 10 MG/ML
VIAL (ML) INTRAVENOUS AS NEEDED
Status: DISCONTINUED | OUTPATIENT
Start: 2021-02-23 | End: 2021-02-23 | Stop reason: SURG

## 2021-02-23 RX ORDER — PROMETHAZINE HYDROCHLORIDE 25 MG/1
25 TABLET ORAL ONCE AS NEEDED
Status: DISCONTINUED | OUTPATIENT
Start: 2021-02-23 | End: 2021-02-23 | Stop reason: HOSPADM

## 2021-02-23 RX ORDER — LABETALOL HYDROCHLORIDE 5 MG/ML
5 INJECTION, SOLUTION INTRAVENOUS
Status: DISCONTINUED | OUTPATIENT
Start: 2021-02-23 | End: 2021-02-23 | Stop reason: HOSPADM

## 2021-02-23 RX ORDER — SODIUM CHLORIDE 0.9 % (FLUSH) 0.9 %
10 SYRINGE (ML) INJECTION AS NEEDED
Status: DISCONTINUED | OUTPATIENT
Start: 2021-02-23 | End: 2021-02-23 | Stop reason: HOSPADM

## 2021-02-23 RX ORDER — ONDANSETRON 2 MG/ML
4 INJECTION INTRAMUSCULAR; INTRAVENOUS ONCE AS NEEDED
Status: COMPLETED | OUTPATIENT
Start: 2021-02-23 | End: 2021-02-23

## 2021-02-23 RX ORDER — EPHEDRINE SULFATE 50 MG/ML
5 INJECTION, SOLUTION INTRAVENOUS ONCE AS NEEDED
Status: DISCONTINUED | OUTPATIENT
Start: 2021-02-23 | End: 2021-02-23 | Stop reason: HOSPADM

## 2021-02-23 RX ORDER — BUPIVACAINE HYDROCHLORIDE AND EPINEPHRINE 5; 5 MG/ML; UG/ML
INJECTION, SOLUTION PERINEURAL AS NEEDED
Status: DISCONTINUED | OUTPATIENT
Start: 2021-02-23 | End: 2021-02-23 | Stop reason: HOSPADM

## 2021-02-23 RX ORDER — DIPHENHYDRAMINE HCL 25 MG
25 CAPSULE ORAL
Status: DISCONTINUED | OUTPATIENT
Start: 2021-02-23 | End: 2021-02-23 | Stop reason: HOSPADM

## 2021-02-23 RX ORDER — OXYCODONE AND ACETAMINOPHEN 7.5; 325 MG/1; MG/1
1 TABLET ORAL ONCE AS NEEDED
Status: DISCONTINUED | OUTPATIENT
Start: 2021-02-23 | End: 2021-02-23 | Stop reason: HOSPADM

## 2021-02-23 RX ORDER — SUCCINYLCHOLINE CHLORIDE 20 MG/ML
INJECTION INTRAMUSCULAR; INTRAVENOUS AS NEEDED
Status: DISCONTINUED | OUTPATIENT
Start: 2021-02-23 | End: 2021-02-23 | Stop reason: SURG

## 2021-02-23 RX ORDER — SODIUM CHLORIDE 0.9 % (FLUSH) 0.9 %
3 SYRINGE (ML) INJECTION EVERY 12 HOURS SCHEDULED
Status: DISCONTINUED | OUTPATIENT
Start: 2021-02-23 | End: 2021-02-23 | Stop reason: HOSPADM

## 2021-02-23 RX ORDER — SODIUM CHLORIDE 0.9 % (FLUSH) 0.9 %
3-10 SYRINGE (ML) INJECTION AS NEEDED
Status: DISCONTINUED | OUTPATIENT
Start: 2021-02-23 | End: 2021-02-23 | Stop reason: HOSPADM

## 2021-02-23 RX ORDER — PROMETHAZINE HYDROCHLORIDE 25 MG/1
25 SUPPOSITORY RECTAL ONCE AS NEEDED
Status: DISCONTINUED | OUTPATIENT
Start: 2021-02-23 | End: 2021-02-23 | Stop reason: HOSPADM

## 2021-02-23 RX ORDER — LIDOCAINE HYDROCHLORIDE 10 MG/ML
0.5 INJECTION, SOLUTION EPIDURAL; INFILTRATION; INTRACAUDAL; PERINEURAL ONCE AS NEEDED
Status: DISCONTINUED | OUTPATIENT
Start: 2021-02-23 | End: 2021-02-23 | Stop reason: HOSPADM

## 2021-02-23 RX ORDER — FAMOTIDINE 10 MG/ML
20 INJECTION, SOLUTION INTRAVENOUS ONCE
Status: COMPLETED | OUTPATIENT
Start: 2021-02-23 | End: 2021-02-23

## 2021-02-23 RX ORDER — HYDROCODONE BITARTRATE AND ACETAMINOPHEN 7.5; 325 MG/1; MG/1
1 TABLET ORAL EVERY 6 HOURS PRN
Qty: 30 TABLET | Refills: 0 | Status: SHIPPED | OUTPATIENT
Start: 2021-02-23 | End: 2021-03-19

## 2021-02-23 RX ORDER — MIDAZOLAM HYDROCHLORIDE 1 MG/ML
2 INJECTION INTRAMUSCULAR; INTRAVENOUS
Status: DISCONTINUED | OUTPATIENT
Start: 2021-02-23 | End: 2021-02-23 | Stop reason: HOSPADM

## 2021-02-23 RX ORDER — FLUMAZENIL 0.1 MG/ML
0.2 INJECTION INTRAVENOUS AS NEEDED
Status: DISCONTINUED | OUTPATIENT
Start: 2021-02-23 | End: 2021-02-23 | Stop reason: HOSPADM

## 2021-02-23 RX ORDER — LIDOCAINE HYDROCHLORIDE 20 MG/ML
INJECTION, SOLUTION INFILTRATION; PERINEURAL AS NEEDED
Status: DISCONTINUED | OUTPATIENT
Start: 2021-02-23 | End: 2021-02-23 | Stop reason: SURG

## 2021-02-23 RX ORDER — FENTANYL CITRATE 50 UG/ML
INJECTION, SOLUTION INTRAMUSCULAR; INTRAVENOUS AS NEEDED
Status: DISCONTINUED | OUTPATIENT
Start: 2021-02-23 | End: 2021-02-23 | Stop reason: SURG

## 2021-02-23 RX ORDER — ACETAMINOPHEN 500 MG
1000 TABLET ORAL EVERY 6 HOURS PRN
COMMUNITY
End: 2021-09-02

## 2021-02-23 RX ORDER — FENTANYL CITRATE 50 UG/ML
50 INJECTION, SOLUTION INTRAMUSCULAR; INTRAVENOUS
Status: DISCONTINUED | OUTPATIENT
Start: 2021-02-23 | End: 2021-02-23 | Stop reason: HOSPADM

## 2021-02-23 RX ORDER — SODIUM CHLORIDE, SODIUM LACTATE, POTASSIUM CHLORIDE, CALCIUM CHLORIDE 600; 310; 30; 20 MG/100ML; MG/100ML; MG/100ML; MG/100ML
9 INJECTION, SOLUTION INTRAVENOUS CONTINUOUS
Status: DISCONTINUED | OUTPATIENT
Start: 2021-02-23 | End: 2021-02-23 | Stop reason: HOSPADM

## 2021-02-23 RX ORDER — DIPHENHYDRAMINE HYDROCHLORIDE 50 MG/ML
12.5 INJECTION INTRAMUSCULAR; INTRAVENOUS
Status: DISCONTINUED | OUTPATIENT
Start: 2021-02-23 | End: 2021-02-23 | Stop reason: HOSPADM

## 2021-02-23 RX ORDER — MAGNESIUM HYDROXIDE 1200 MG/15ML
LIQUID ORAL AS NEEDED
Status: DISCONTINUED | OUTPATIENT
Start: 2021-02-23 | End: 2021-02-23 | Stop reason: HOSPADM

## 2021-02-23 RX ORDER — CEFAZOLIN SODIUM 2 G/100ML
2 INJECTION, SOLUTION INTRAVENOUS ONCE
Status: COMPLETED | OUTPATIENT
Start: 2021-02-23 | End: 2021-02-23

## 2021-02-23 RX ADMIN — ROCURONIUM BROMIDE 5 MG: 50 INJECTION INTRAVENOUS at 10:17

## 2021-02-23 RX ADMIN — FENTANYL CITRATE 50 MCG: 50 INJECTION, SOLUTION INTRAMUSCULAR; INTRAVENOUS at 12:01

## 2021-02-23 RX ADMIN — SUGAMMADEX 400 MG: 100 INJECTION, SOLUTION INTRAVENOUS at 11:22

## 2021-02-23 RX ADMIN — LIDOCAINE HYDROCHLORIDE 100 MG: 20 INJECTION, SOLUTION INFILTRATION; PERINEURAL at 10:18

## 2021-02-23 RX ADMIN — ONDANSETRON 4 MG: 2 INJECTION INTRAMUSCULAR; INTRAVENOUS at 12:38

## 2021-02-23 RX ADMIN — SODIUM CHLORIDE, POTASSIUM CHLORIDE, SODIUM LACTATE AND CALCIUM CHLORIDE: 600; 310; 30; 20 INJECTION, SOLUTION INTRAVENOUS at 10:08

## 2021-02-23 RX ADMIN — PROPOFOL 300 MG: 10 INJECTION, EMULSION INTRAVENOUS at 10:18

## 2021-02-23 RX ADMIN — ROCURONIUM BROMIDE 45 MG: 50 INJECTION INTRAVENOUS at 10:25

## 2021-02-23 RX ADMIN — FENTANYL CITRATE 50 MCG: 50 INJECTION, SOLUTION INTRAMUSCULAR; INTRAVENOUS at 11:52

## 2021-02-23 RX ADMIN — SODIUM CHLORIDE, POTASSIUM CHLORIDE, SODIUM LACTATE AND CALCIUM CHLORIDE 9 ML/HR: 600; 310; 30; 20 INJECTION, SOLUTION INTRAVENOUS at 08:53

## 2021-02-23 RX ADMIN — HYDROMORPHONE HYDROCHLORIDE 0.5 MG: 1 INJECTION, SOLUTION INTRAMUSCULAR; INTRAVENOUS; SUBCUTANEOUS at 11:56

## 2021-02-23 RX ADMIN — FAMOTIDINE 20 MG: 10 INJECTION INTRAVENOUS at 08:53

## 2021-02-23 RX ADMIN — HYDROCODONE BITARTRATE AND ACETAMINOPHEN 1 TABLET: 7.5; 325 TABLET ORAL at 12:12

## 2021-02-23 RX ADMIN — FENTANYL CITRATE 100 MCG: 50 INJECTION INTRAMUSCULAR; INTRAVENOUS at 10:22

## 2021-02-23 RX ADMIN — SUCCINYLCHOLINE CHLORIDE 200 MG: 20 INJECTION, SOLUTION INTRAMUSCULAR; INTRAVENOUS; PARENTERAL at 10:18

## 2021-02-23 RX ADMIN — PROPOFOL 100 MG: 10 INJECTION, EMULSION INTRAVENOUS at 10:22

## 2021-02-23 RX ADMIN — CEFAZOLIN SODIUM 2 G: 2 INJECTION, SOLUTION INTRAVENOUS at 10:22

## 2021-02-23 NOTE — ANESTHESIA PREPROCEDURE EVALUATION
Anesthesia Evaluation     Patient summary reviewed   NPO Solid Status: > 8 hours  NPO Liquid Status: > 2 hours           Airway   Mallampati: I  TM distance: >3 FB  Neck ROM: full  Dental    (+) poor dentition    Comment: Multiple chips. The most obvious chips are noted.       Pulmonary     breath sounds clear to auscultation  (-) shortness of breath  Cardiovascular   Exercise tolerance: good (4-7 METS)    Rhythm: regular  Rate: normal    (+) hypertension well controlled,   (-) angina, GUEVARA      Neuro/Psych  GI/Hepatic/Renal/Endo    (+)  GERD well controlled,      Musculoskeletal     Abdominal    Substance History      OB/GYN          Other                        Anesthesia Plan    ASA 2     general     intravenous induction     Anesthetic plan, all risks, benefits, and alternatives have been provided, discussed and informed consent has been obtained with: patient.

## 2021-02-23 NOTE — ANESTHESIA PROCEDURE NOTES
Airway  Urgency: elective    Date/Time: 2/23/2021 10:20 AM  Difficult airway    General Information and Staff    Patient location during procedure: OR  Anesthesiologist: Og Mcpherson MD    Indications and Patient Condition  Indications for airway management: airway protection    Preoxygenated: yes  Mask difficulty assessment: 1 - vent by mask    Final Airway Details  Final airway type: endotracheal airway      Successful airway: ETT  Cuffed: yes   Successful intubation technique: direct laryngoscopy and video laryngoscopy  Facilitating devices/methods: intubating stylet  Endotracheal tube insertion site: oral  Blade: Kaylee  Blade size: D  ETT size (mm): 8.0  Cormack-Lehane Classification: grade IIa - partial view of glottis  Placement verified by: chest auscultation and capnometry   Number of attempts at approach: 2

## 2021-03-08 ENCOUNTER — OFFICE VISIT (OUTPATIENT)
Dept: SURGERY | Facility: CLINIC | Age: 41
End: 2021-03-08

## 2021-03-08 DIAGNOSIS — K40.90 RIGHT INGUINAL HERNIA: Primary | ICD-10-CM

## 2021-03-08 PROCEDURE — 99024 POSTOP FOLLOW-UP VISIT: CPT | Performed by: SURGERY

## 2021-03-08 RX ORDER — DOCUSATE SODIUM 100 MG/1
100 CAPSULE, LIQUID FILLED ORAL 2 TIMES DAILY
COMMUNITY
End: 2021-09-02

## 2021-03-08 NOTE — PROGRESS NOTES
Postop da Vanessa robot-assisted right inguinal hernia repair    Subjective:  Gradually improving.  Still with some pain in the right groin and into the right testicle, intermittent.  Also complains of some persistent constipation.  Back pain also remains an issue.    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

## 2021-03-19 ENCOUNTER — OFFICE VISIT (OUTPATIENT)
Dept: INTERNAL MEDICINE | Facility: CLINIC | Age: 41
End: 2021-03-19

## 2021-03-19 VITALS
SYSTOLIC BLOOD PRESSURE: 126 MMHG | RESPIRATION RATE: 18 BRPM | BODY MASS INDEX: 33.6 KG/M2 | WEIGHT: 240 LBS | TEMPERATURE: 97.3 F | DIASTOLIC BLOOD PRESSURE: 64 MMHG | HEIGHT: 71 IN | OXYGEN SATURATION: 98 % | HEART RATE: 78 BPM

## 2021-03-19 DIAGNOSIS — K21.9 GASTROESOPHAGEAL REFLUX DISEASE, UNSPECIFIED WHETHER ESOPHAGITIS PRESENT: ICD-10-CM

## 2021-03-19 DIAGNOSIS — I10 ESSENTIAL HYPERTENSION: Primary | ICD-10-CM

## 2021-03-19 PROCEDURE — 99203 OFFICE O/P NEW LOW 30 MIN: CPT | Performed by: NURSE PRACTITIONER

## 2021-03-19 RX ORDER — NADOLOL 20 MG/1
20 TABLET ORAL DAILY
Qty: 90 TABLET | Refills: 1 | Status: SHIPPED | OUTPATIENT
Start: 2021-03-19 | End: 2021-09-02

## 2021-03-19 NOTE — PROGRESS NOTES
Subjective   Arsenio Beard is a 40 y.o. male. Patient is here today for   Chief Complaint   Patient presents with   • Hypertension   • Heartburn   • Establish Care          Vitals:    03/19/21 0822   BP: 126/64   Pulse: 78   Resp: 18   Temp: 97.3 °F (36.3 °C)   SpO2: 98%     Body mass index is 33.47 kg/m².  The following portions of the patient's history were reviewed and updated as appropriate: allergies, current medications, past family history, past medical history, past social history, past surgical history and problem list.    Past Medical History:   Diagnosis Date   • At risk for sleep apnea     SLEEP STUDY WAS NEGATIVE   • GERD (gastroesophageal reflux disease)    • Hypertension    • Low back pain    • Right groin pain    • Seasonal allergies       No Known Allergies   Social History     Socioeconomic History   • Marital status: Single     Spouse name: Not on file   • Number of children: Not on file   • Years of education: Not on file   • Highest education level: Not on file   Tobacco Use   • Smoking status: Never Smoker   • Smokeless tobacco: Never Used   Vaping Use   • Vaping Use: Never used   Substance and Sexual Activity   • Alcohol use: Never   • Drug use: Yes     Comment: STEROID USE X3 YEARS/4501-2724   • Sexual activity: Defer        Current Outpatient Medications:   •  acetaminophen (TYLENOL) 500 MG tablet, Take 1,000 mg by mouth Every 6 (Six) Hours As Needed for Mild Pain ., Disp: , Rfl:   •  docusate sodium (COLACE) 100 MG capsule, Take 100 mg by mouth 2 (Two) Times a Day., Disp: , Rfl:   •  fluticasone (FLONASE) 50 MCG/ACT nasal spray, 2 sprays into the nostril(s) as directed by provider Daily., Disp: , Rfl:   •  ibuprofen (ADVIL,MOTRIN) 600 MG tablet, Take 600 mg by mouth Every 8 (Eight) Hours As Needed for Mild Pain ., Disp: , Rfl:   •  methocarbamol (ROBAXIN) 750 MG tablet, Take 750 mg by mouth 2 (Two) Times a Day As Needed for Muscle Spasms., Disp: , Rfl:   •  nadolol (CORGARD) 20 MG  tablet, Take 1 tablet by mouth Daily., Disp: 90 tablet, Rfl: 1  •  omeprazole (priLOSEC) 20 MG capsule, Take 40 mg by mouth Daily., Disp: , Rfl:      Objective     History of Present Illness  Arsenio is a 40 year old male new patient who is here to establish care. He has GERD and HTN. He is a previous patient of Dr Phillips at [facility] in [place]. BP has been stable on corgard. He is taking omeprazole daily for GERD. He was referred to GI but his appt was cancelled last year due to covid 19 pandemic. He needs another referral. He has had a sleep study which was negative for MACKENZIE. He had inguinal hernia surgery last month and still has some pain. He is compliant with his medication and is not experiencing any side effects. He does not monitor his BP at home but he does have a cuff . He was drinking a lot of caffeine including 2-3 monster energy drinks and a two liter of soda a day, but has stopped that since his hernia surgery.   The following data was reviewed by: ANURADHA Klein on 03/19/2021:  Common labs    Common Labsle 2/19/21 2/19/21    0832 0832   Glucose  143 (A)   BUN  20   Creatinine  1.06   eGFR Non African Am  77   Sodium  140   Potassium  3.7   Chloride  104   Calcium  8.9   WBC 10.11    Hemoglobin 15.5    Hematocrit 44.5    Platelets 224    (A) Abnormal value            Data reviewed: Consultant notes cardiology          Review of Systems   Constitutional: Negative for fatigue.   Respiratory: Negative for cough, chest tightness, shortness of breath and wheezing.    Cardiovascular: Negative for chest pain, palpitations and leg swelling.   Gastrointestinal:        He has some pain from his recent surgery   Heartburn    Genitourinary: Negative for difficulty urinating and frequency.   Neurological: Positive for headaches (occasional pressure in his forehead. ).   Psychiatric/Behavioral: Negative for sleep disturbance.       Physical Exam  Vitals and nursing note reviewed.    Constitutional:       General: He is not in acute distress.     Appearance: Normal appearance.   HENT:      Head: Normocephalic.   Cardiovascular:      Rate and Rhythm: Normal rate and regular rhythm.      Heart sounds: Normal heart sounds.   Neurological:      Mental Status: He is alert.         ASSESSMENT     Problems Addressed this Visit     Essential hypertension - Primary    Relevant Medications    nadolol (CORGARD) 20 MG tablet      Other Visit Diagnoses     Gastroesophageal reflux disease, unspecified whether esophagitis present        Relevant Orders    Ambulatory Referral to Gastroenterology      Diagnoses       Codes Comments    Essential hypertension    -  Primary ICD-10-CM: I10  ICD-9-CM: 401.9     Gastroesophageal reflux disease, unspecified whether esophagitis present     ICD-10-CM: K21.9  ICD-9-CM: 530.81           PLAN  HTN is well controlled , continue current medication. He states that cardiology changed his medication to losartan but his previous pcp did not agree with that and continued him on nadolol . Refill sent to pharmacy  He recently had labs.   Will refer to GI for GERD and possible egd  Recommend staying off caffeine or limiting. Avoid excessive amounts including energy drinks like monster and red bull  Recommend DASH diet  Exercise and weight loss    Return in about 6 months (around 9/19/2021) for Annual physical, with labs, [facility] .

## 2021-04-02 ENCOUNTER — BULK ORDERING (OUTPATIENT)
Dept: CASE MANAGEMENT | Facility: OTHER | Age: 41
End: 2021-04-02

## 2021-04-02 ENCOUNTER — TRANSCRIBE ORDERS (OUTPATIENT)
Dept: PHYSICAL THERAPY | Facility: CLINIC | Age: 41
End: 2021-04-02

## 2021-04-02 DIAGNOSIS — Z23 IMMUNIZATION DUE: ICD-10-CM

## 2021-04-02 DIAGNOSIS — S39.012A STRAIN OF LUMBAR REGION, INITIAL ENCOUNTER: Primary | ICD-10-CM

## 2021-04-06 ENCOUNTER — TREATMENT (OUTPATIENT)
Dept: PHYSICAL THERAPY | Facility: CLINIC | Age: 41
End: 2021-04-06

## 2021-04-06 DIAGNOSIS — S39.012D STRAIN OF LUMBAR REGION, SUBSEQUENT ENCOUNTER: Primary | ICD-10-CM

## 2021-04-06 PROCEDURE — 97112 NEUROMUSCULAR REEDUCATION: CPT | Performed by: PHYSICAL THERAPIST

## 2021-04-06 PROCEDURE — 97161 PT EVAL LOW COMPLEX 20 MIN: CPT | Performed by: PHYSICAL THERAPIST

## 2021-04-06 PROCEDURE — 97110 THERAPEUTIC EXERCISES: CPT | Performed by: PHYSICAL THERAPIST

## 2021-04-06 NOTE — PROGRESS NOTES
Physical Therapy Initial Evaluation and Plan of Care        Subjective Evaluation    History of Present Illness  Date of onset: 2021  Mechanism of injury: Patient injured his back while picking up a heavy object at work (weighed about 100-150 lbs).  Patient also developed a hernia.  Reports that the hernia was repaired at the end of February.  Denies bowel or bladder issues.      Patient Occupation: [employer]   Precautions and Work Restrictions: light dutyPain  Current pain ratin  At worst pain ratin  Location: right lumbar spine, right oblique to right testicle   Quality: sharp  Relieving factors: rest  Aggravating factors: lifting (sitting and standing long periods)  Progression: improved             Objective          Postural Observations    Additional Postural Observation Details  Incision sites are intact from the hernia repair, scab at the site over the belly button.    Palpation     Additional Palpation Details  TTP to the right lumbar pvms and right glut.    Active Range of Motion     Lumbar   Flexion: 90 degrees   Extension: 20 degrees   Left lateral flexion: 19 degrees   Right lateral flexion: 17 degrees     Strength/Myotome Testing     Left Hip   Planes of Motion   Flexion: 4  Abduction: 4  Adduction: 4    Right Hip   Planes of Motion   Flexion: 4+  Abduction: 4+  Adduction: 4+          Assessment & Plan     Assessment  Impairments: abnormal or restricted ROM, activity intolerance, impaired physical strength, lacks appropriate home exercise program and pain with function  Assessment details: Patient presents with c/o pain, TTP, limited AROM and decreased strength which is limiting his ability to perform full job duties and ADL'S.  Barriers to therapy: none  Prognosis: good  Prognosis details: STG's  1)  Independent with HEP  2)  Decrease pain by 50% or more  3)  AROM WNL for the lumbar spine    LTG's   1)  Independent with HEP progression  2)  Decrease pain by 75% or more  3)  Increase  strength for the LE to 4+/5  4)  No TTP present  5)  Patient to lift floor to waist up to 30 lbs      Plan  Therapy options: will be seen for skilled physical therapy services  Planned modality interventions: TENS and thermotherapy (hydrocollator packs)  Planned therapy interventions: strengthening, stretching, therapeutic activities, home exercise program and neuromuscular re-education  Treatment plan discussed with: patient        Manual Therapy:    0     mins  09196;  Therapeutic Exercise:    15     mins  10338;     Neuromuscular Perla:    8    mins  46522;    Therapeutic Activity:     0     mins  99548;     Gait Trainin     mins  11983;     Ultrasound:     0     mins  84379;    Work Hardening           0      mins 52978  Iontophoresis               0   mins 59927    Timed Treatment:   23   mins   Total Treatment:     35   mins    PT SIGNATURE: Lui Godwin, PT   DATE TREATMENT INITIATED: 2021    Initial Certification  Certification Period: 2021  I certify that the therapy services are furnished while this patient is under my care.  The services outlined above are required by this patient, and will be reviewed every 90 days.     PHYSICIAN: Nubia Ruiz, APRN      DATE:     Please sign and return via fax to [phone].. Thank you, [facility].

## 2021-04-08 ENCOUNTER — TREATMENT (OUTPATIENT)
Dept: PHYSICAL THERAPY | Facility: CLINIC | Age: 41
End: 2021-04-08

## 2021-04-08 DIAGNOSIS — S39.012D STRAIN OF LUMBAR REGION, SUBSEQUENT ENCOUNTER: Primary | ICD-10-CM

## 2021-04-08 PROCEDURE — 97110 THERAPEUTIC EXERCISES: CPT | Performed by: PHYSICAL THERAPIST

## 2021-04-08 NOTE — PROGRESS NOTES
"Physical Therapy Daily Progress Note      Visit # 2      Subjective Evaluation    History of Present Illness    Subjective comment: Pt reports that his lumbar is sore today.Pain  Current pain ratin           Objective   See Exercise, Manual, and Modality Logs for complete treatment.   Added Hip Add, abd,GS, Ab bracing    Assessment & Plan     Assessment  Assessment details: Pt completed treatment with c/o pain/discomfort in abdominal and lumbar.  Pt c/o pain in (R) lower abdominal and groin from fig 4 stretch.  It was modified to not crossing his ankle across his knee. He was able to complete the exercise with this  Modification. After he finished with ab bracing he reported that he has the same pain as he progressed.  He described the discomfort in his lumbar as \"tightness\".                     Manual Therapy:    0     mins  58899;  Therapeutic Exercise:    33     mins  27135;     Neuromuscular Perla:    0    mins  78419;    Therapeutic Activity:     0     mins  43473;     Gait Trainin     mins  56958;     Ultrasound:     0     mins  05860;    Work Hardening           0      mins 42715  Iontophoresis               0   mins 48521  E-Stim                          _0_ mins 20240 ( )    Timed Treatment:   33   mins   Total Treatment:     33   mins    Fermin Fisher PTA  Physical Therapist Assistant  "

## 2021-04-12 ENCOUNTER — APPOINTMENT (OUTPATIENT)
Dept: CT IMAGING | Facility: HOSPITAL | Age: 41
End: 2021-04-12

## 2021-04-12 ENCOUNTER — HOSPITAL ENCOUNTER (EMERGENCY)
Facility: HOSPITAL | Age: 41
Discharge: HOME | End: 2021-04-12
Attending: EMERGENCY MEDICINE | Admitting: EMERGENCY MEDICINE

## 2021-04-12 VITALS
TEMPERATURE: 97 F | RESPIRATION RATE: 16 BRPM | DIASTOLIC BLOOD PRESSURE: 97 MMHG | OXYGEN SATURATION: 96 % | SYSTOLIC BLOOD PRESSURE: 148 MMHG | HEART RATE: 71 BPM | BODY MASS INDEX: 32.64 KG/M2 | WEIGHT: 241 LBS | HEIGHT: 72 IN

## 2021-04-12 DIAGNOSIS — R10.30 LOWER ABDOMINAL PAIN: Primary | ICD-10-CM

## 2021-04-12 DIAGNOSIS — G89.29 CHRONIC BILATERAL BACK PAIN, UNSPECIFIED BACK LOCATION: ICD-10-CM

## 2021-04-12 DIAGNOSIS — M54.9 CHRONIC BILATERAL BACK PAIN, UNSPECIFIED BACK LOCATION: ICD-10-CM

## 2021-04-12 LAB
ALBUMIN SERPL-MCNC: 4.6 G/DL (ref 3.5–5.2)
ALBUMIN/GLOB SERPL: 1.7 G/DL
ALP SERPL-CCNC: 50 U/L (ref 39–117)
ALT SERPL W P-5'-P-CCNC: 48 U/L (ref 1–41)
ANION GAP SERPL CALCULATED.3IONS-SCNC: 9.3 MMOL/L (ref 5–15)
AST SERPL-CCNC: 20 U/L (ref 1–40)
BASOPHILS # BLD AUTO: 0.03 10*3/MM3 (ref 0–0.2)
BASOPHILS NFR BLD AUTO: 0.4 % (ref 0–1.5)
BILIRUB SERPL-MCNC: 0.4 MG/DL (ref 0–1.2)
BILIRUB UR QL STRIP: NEGATIVE
BUN SERPL-MCNC: 13 MG/DL (ref 6–20)
BUN/CREAT SERPL: 13.1 (ref 7–25)
CALCIUM SPEC-SCNC: 8.8 MG/DL (ref 8.6–10.5)
CHLORIDE SERPL-SCNC: 103 MMOL/L (ref 98–107)
CLARITY UR: CLEAR
CO2 SERPL-SCNC: 26.7 MMOL/L (ref 22–29)
COLOR UR: YELLOW
CREAT SERPL-MCNC: 0.99 MG/DL (ref 0.76–1.27)
DEPRECATED RDW RBC AUTO: 42.1 FL (ref 37–54)
EOSINOPHIL # BLD AUTO: 0.07 10*3/MM3 (ref 0–0.4)
EOSINOPHIL NFR BLD AUTO: 0.9 % (ref 0.3–6.2)
ERYTHROCYTE [DISTWIDTH] IN BLOOD BY AUTOMATED COUNT: 12.9 % (ref 12.3–15.4)
GFR SERPL CREATININE-BSD FRML MDRD: 83 ML/MIN/1.73
GLOBULIN UR ELPH-MCNC: 2.7 GM/DL
GLUCOSE SERPL-MCNC: 138 MG/DL (ref 65–99)
GLUCOSE UR STRIP-MCNC: NEGATIVE MG/DL
HCT VFR BLD AUTO: 45.1 % (ref 37.5–51)
HGB BLD-MCNC: 15.5 G/DL (ref 13–17.7)
HGB UR QL STRIP.AUTO: NEGATIVE
IMM GRANULOCYTES # BLD AUTO: 0.03 10*3/MM3 (ref 0–0.05)
IMM GRANULOCYTES NFR BLD AUTO: 0.4 % (ref 0–0.5)
KETONES UR QL STRIP: NEGATIVE
LEUKOCYTE ESTERASE UR QL STRIP.AUTO: NEGATIVE
LYMPHOCYTES # BLD AUTO: 1.48 10*3/MM3 (ref 0.7–3.1)
LYMPHOCYTES NFR BLD AUTO: 18.7 % (ref 19.6–45.3)
MCH RBC QN AUTO: 30.8 PG (ref 26.6–33)
MCHC RBC AUTO-ENTMCNC: 34.4 G/DL (ref 31.5–35.7)
MCV RBC AUTO: 89.5 FL (ref 79–97)
MONOCYTES # BLD AUTO: 0.43 10*3/MM3 (ref 0.1–0.9)
MONOCYTES NFR BLD AUTO: 5.4 % (ref 5–12)
NEUTROPHILS NFR BLD AUTO: 5.86 10*3/MM3 (ref 1.7–7)
NEUTROPHILS NFR BLD AUTO: 74.2 % (ref 42.7–76)
NITRITE UR QL STRIP: NEGATIVE
NRBC BLD AUTO-RTO: 0 /100 WBC (ref 0–0.2)
PH UR STRIP.AUTO: 8.5 [PH] (ref 5–8)
PLATELET # BLD AUTO: 207 10*3/MM3 (ref 140–450)
PMV BLD AUTO: 9.4 FL (ref 6–12)
POTASSIUM SERPL-SCNC: 4 MMOL/L (ref 3.5–5.2)
PROT SERPL-MCNC: 7.3 G/DL (ref 6–8.5)
PROT UR QL STRIP: NEGATIVE
RBC # BLD AUTO: 5.04 10*6/MM3 (ref 4.14–5.8)
SODIUM SERPL-SCNC: 139 MMOL/L (ref 136–145)
SP GR UR STRIP: 1.02 (ref 1–1.03)
UROBILINOGEN UR QL STRIP: ABNORMAL
WBC # BLD AUTO: 7.9 10*3/MM3 (ref 3.4–10.8)

## 2021-04-12 PROCEDURE — 81003 URINALYSIS AUTO W/O SCOPE: CPT | Performed by: EMERGENCY MEDICINE

## 2021-04-12 PROCEDURE — 25010000002 KETOROLAC TROMETHAMINE PER 15 MG: Performed by: EMERGENCY MEDICINE

## 2021-04-12 PROCEDURE — 99283 EMERGENCY DEPT VISIT LOW MDM: CPT

## 2021-04-12 PROCEDURE — 80053 COMPREHEN METABOLIC PANEL: CPT | Performed by: EMERGENCY MEDICINE

## 2021-04-12 PROCEDURE — 96374 THER/PROPH/DIAG INJ IV PUSH: CPT

## 2021-04-12 PROCEDURE — 85025 COMPLETE CBC W/AUTO DIFF WBC: CPT | Performed by: EMERGENCY MEDICINE

## 2021-04-12 PROCEDURE — 74177 CT ABD & PELVIS W/CONTRAST: CPT

## 2021-04-12 PROCEDURE — 25010000002 IOPAMIDOL 61 % SOLUTION: Performed by: EMERGENCY MEDICINE

## 2021-04-12 RX ORDER — KETOROLAC TROMETHAMINE 15 MG/ML
15 INJECTION, SOLUTION INTRAMUSCULAR; INTRAVENOUS ONCE
Status: COMPLETED | OUTPATIENT
Start: 2021-04-12 | End: 2021-04-12

## 2021-04-12 RX ADMIN — IOPAMIDOL 85 ML: 612 INJECTION, SOLUTION INTRAVENOUS at 14:05

## 2021-04-12 RX ADMIN — KETOROLAC TROMETHAMINE 15 MG: 15 INJECTION, SOLUTION INTRAMUSCULAR; INTRAVENOUS at 14:59

## 2021-04-12 NOTE — ED NOTES
Pt states he had inguinal hernia repair at end of Feb.  States he has followed up with his surgeon and with occupational health. States today was first day back to work and pain is too much to take so he came to ER.  Denies vomiting.   Mask placed on patient in triage.  Triage RN wearing mask throughout encounter.       Osvaldo Valencia, RN  04/12/21 1137       Osvaldo Valencia RN  04/12/21 1132

## 2021-04-12 NOTE — DISCHARGE INSTRUCTIONS
Continue care medications, Tylenol for pain as needed, stay well-hydrated plenty of water, continue to follow-up with your physicians for recheck, ED return for worsening symptoms as needed.

## 2021-04-12 NOTE — ED PROVIDER NOTES
EMERGENCY DEPARTMENT ENCOUNTER    Room Number:  39/39  Date of encounter:  4/12/2021  PCP: Provider, No Known  Historian: Patient      HPI:  Chief Complaint: Right-sided abdominal and lower abdominal pain  A complete HPI/ROS/PMH/PSH/SH/FH are unobtainable due to: None    Context: Arsenio Beard is a 40 y.o. male who presents to the ED via private vehicle for evaluation for ongoing and worsening back, right side abdominal and right lower abdominal pain since undergoing hernia repair February/March.  Followed up with the nurse practitioner last week but has not had any further imaging studies.  Denies any fevers, chills, cough, chest pain, short of breath, nausea, vomiting.  Bowel movements are stable for him which are approximately twice a week, denies any blood in his stool or urine.  Urinating regularly.  Eating and drinking well.  Has been seeing a physical therapy for persistent mid back pain related to an injury at work.  Was prescribed ibuprofen and Tylenol, has not been taking it.      MEDICAL RECORD REVIEW    Op note reviewed from February 23 of 2021 with Dr. Ocampo, patient underwent right inguinal hernia repair laparoscopically.    PAST MEDICAL HISTORY  Active Ambulatory Problems     Diagnosis Date Noted   • Precordial pain 01/26/2020   • Essential hypertension 01/26/2020   • Right inguinal hernia 02/17/2021     Resolved Ambulatory Problems     Diagnosis Date Noted   • No Resolved Ambulatory Problems     Past Medical History:   Diagnosis Date   • At risk for sleep apnea    • GERD (gastroesophageal reflux disease)    • Hypertension    • Low back pain    • Right groin pain    • Seasonal allergies          PAST SURGICAL HISTORY  Past Surgical History:   Procedure Laterality Date   • FACIAL RECONSTRUCTION SURGERY      as a child   • INGUINAL HERNIA REPAIR Right 2/23/2021    Procedure: right INGUINAL HERNIA REPAIR LAPAROSCOPIC WITH DAVINCI ROBOT;  Surgeon: Hiram Ocampo MD;  Location: [facility] OR;   Service: [facility];  Laterality: Right;         FAMILY HISTORY  Family History   Problem Relation Age of Onset   • Heart disease Maternal Grandmother    • Heart attack Maternal Grandmother    • Malig Hyperthermia Neg Hx          SOCIAL HISTORY  Social History     Socioeconomic History   • Marital status: Single     Spouse name: Not on file   • Number of children: Not on file   • Years of education: Not on file   • Highest education level: Not on file   Tobacco Use   • Smoking status: Never Smoker   • Smokeless tobacco: Never Used   Vaping Use   • Vaping Use: Never used   Substance and Sexual Activity   • Alcohol use: Never   • Drug use: Yes     Comment: STEROID USE X3 YEARS/5508-1828   • Sexual activity: Defer         ALLERGIES  Patient has no known allergies.        REVIEW OF SYSTEMS  Review of Systems     All systems reviewed and negative except for those discussed in HPI.       PHYSICAL EXAM    I have reviewed the triage vital signs and nursing notes.    ED Triage Vitals   Temp Heart Rate Resp BP SpO2   04/12/21 1137 04/12/21 1137 04/12/21 1137 04/12/21 1203 04/12/21 1137   97 °F (36.1 °C) 95 14 117/93 97 %      Temp src Heart Rate Source Patient Position BP Location FiO2 (%)   -- -- -- -- --              Physical Exam  General: Awake, alert, no acute distress  HEENT: Mucous membranes moist, atraumatic, EOMI  Neck: Full ROM  Pulm: Symmetric chest rise, nonlabored, lungs CTAB  Cardiovascular: Regular rate and rhythm, intact distal pulses  GI: Soft, mild right-sided mid to lower abdominal pain to palpation with well-healing old surgical scar sites.  MSK: Full ROM, no deformity, physiotape noted running along the paraspinal musculature of the mid to lower back with some mild tenderness in the right paraspinal paraspinal musculature to palpation but no midline pain to palpation.  Skin: Warm, dry  Neuro: Alert and oriented x 3, GCS 15, moving all extremities, no focal deficits  Psych: Calm, cooperative      Surgical  mask, protective eye goggles, and gloves used during this encounter. Patient in surgical mask.      LAB RESULTS  Recent Results (from the past 24 hour(s))   Urinalysis With Microscopic If Indicated (No Culture) - Urine, Clean Catch    Collection Time: 04/12/21 12:00 PM    Specimen: Urine, Clean Catch   Result Value Ref Range    Color, UA Yellow Yellow, Straw    Appearance, UA Clear Clear    pH, UA 8.5 (H) 5.0 - 8.0    Specific Gravity, UA 1.019 1.005 - 1.030    Glucose, UA Negative Negative    Ketones, UA Negative Negative    Bilirubin, UA Negative Negative    Blood, UA Negative Negative    Protein, UA Negative Negative    Leuk Esterase, UA Negative Negative    Nitrite, UA Negative Negative    Urobilinogen, UA 0.2 E.U./dL 0.2 - 1.0 E.U./dL   Comprehensive Metabolic Panel    Collection Time: 04/12/21 12:01 PM    Specimen: Arm, Right; Blood   Result Value Ref Range    Glucose 138 (H) 65 - 99 mg/dL    BUN 13 6 - 20 mg/dL    Creatinine 0.99 0.76 - 1.27 mg/dL    Sodium 139 136 - 145 mmol/L    Potassium 4.0 3.5 - 5.2 mmol/L    Chloride 103 98 - 107 mmol/L    CO2 26.7 22.0 - 29.0 mmol/L    Calcium 8.8 8.6 - 10.5 mg/dL    Total Protein 7.3 6.0 - 8.5 g/dL    Albumin 4.60 3.50 - 5.20 g/dL    ALT (SGPT) 48 (H) 1 - 41 U/L    AST (SGOT) 20 1 - 40 U/L    Alkaline Phosphatase 50 39 - 117 U/L    Total Bilirubin 0.4 0.0 - 1.2 mg/dL    eGFR Non African Amer 83 >60 mL/min/1.73    Globulin 2.7 gm/dL    A/G Ratio 1.7 g/dL    BUN/Creatinine Ratio 13.1 7.0 - 25.0    Anion Gap 9.3 5.0 - 15.0 mmol/L   CBC Auto Differential    Collection Time: 04/12/21 12:01 PM    Specimen: Arm, Right; Blood   Result Value Ref Range    WBC 7.90 3.40 - 10.80 10*3/mm3    RBC 5.04 4.14 - 5.80 10*6/mm3    Hemoglobin 15.5 13.0 - 17.7 g/dL    Hematocrit 45.1 37.5 - 51.0 %    MCV 89.5 79.0 - 97.0 fL    MCH 30.8 26.6 - 33.0 pg    MCHC 34.4 31.5 - 35.7 g/dL    RDW 12.9 12.3 - 15.4 %    RDW-SD 42.1 37.0 - 54.0 fl    MPV 9.4 6.0 - 12.0 fL    Platelets 207 140 - 450  10*3/mm3    Neutrophil % 74.2 42.7 - 76.0 %    Lymphocyte % 18.7 (L) 19.6 - 45.3 %    Monocyte % 5.4 5.0 - 12.0 %    Eosinophil % 0.9 0.3 - 6.2 %    Basophil % 0.4 0.0 - 1.5 %    Immature Grans % 0.4 0.0 - 0.5 %    Neutrophils, Absolute 5.86 1.70 - 7.00 10*3/mm3    Lymphocytes, Absolute 1.48 0.70 - 3.10 10*3/mm3    Monocytes, Absolute 0.43 0.10 - 0.90 10*3/mm3    Eosinophils, Absolute 0.07 0.00 - 0.40 10*3/mm3    Basophils, Absolute 0.03 0.00 - 0.20 10*3/mm3    Immature Grans, Absolute 0.03 0.00 - 0.05 10*3/mm3    nRBC 0.0 0.0 - 0.2 /100 WBC       Ordered the above labs and independently reviewed the results.        RADIOLOGY  CT Abdomen Pelvis With Contrast    Result Date: 4/12/2021  CT OF THE ABDOMEN AND PELVIS WITH CONTRAST 04/12/2021  HISTORY: Abdominal pain.  Axial images were obtained from the lung bases to the symphysis pubis after intravenous contrast. No oral contrast was given.  There are some minimal areas of atelectasis, scar or inflammatory change in the bilateral posterior lung bases.  The liver, gallbladder, spleen, pancreas, adrenals and kidneys appear unremarkable except for small right renal cyst.  No bowel wall thickening or bowel dilatation is seen. Urinary bladder and prostate gland appear normal.  No abdominal wall hernias are seen.      No acute process identified.  Radiation dose reduction techniques were utilized, including automated exposure control and exposure modulation based on body size.       I ordered the above noted radiological studies. Reviewed by me.  See dictation for official radiology interpretation.      PROCEDURES    Procedures      MEDICATIONS GIVEN IN ER    Medications   iopamidol (ISOVUE-300) 61 % injection 100 mL (85 mL Intravenous Given by Other 4/12/21 9577)   ketorolac (TORADOL) injection 15 mg (15 mg Intravenous Given 4/12/21 9183)         PROGRESS, DATA ANALYSIS, CONSULTS, AND MEDICAL DECISION MAKING    All labs have been independently reviewed by me.  All radiology  studies have been reviewed by me and discussed with radiologist dictating the report.   EKG's independently viewed and interpreted by me.  Discussion below represents my analysis of pertinent findings related to patient's condition, differential diagnosis, treatment plan and final disposition.    Initial concern for possible colitis, diverticulitis, bowel obstruction, recurrent hernia, lumbar fracture that may been missed previously, neuropathic pain, constipation, among others.    ED Course as of Apr 12 1606   Mon Apr 12, 2021   1428 Discussed CT with Dr. Owens, radiologist, no evidence of any acute emergent findings.    [DC]   1459 Patient updated on the reassuring labs and imaging results today, notes any acute emergent issue, will give IV Toradol prior to discharge.  Good hydration, Tylenol ibuprofen at home, continue follow-up with his physicians, ED return for worsening symptoms as needed.    [DC]      ED Course User Index  [DC] Brando Lainez MD       AS OF 16:06 EDT VITALS:    BP - 148/97  HR - 71  TEMP - 97 °F (36.1 °C)  02 SATS - 96%        DIAGNOSIS  Final diagnoses:   Lower abdominal pain   Chronic bilateral back pain, unspecified back location         DISPOSITION  DISCHARGE    Patient discharged in stable condition.    Reviewed implications of results, diagnosis, meds, responsibility to follow up, warning signs and symptoms of possible worsening, potential complications and reasons to return to ER.    Patient/Family voiced understanding of above instructions.    Discussed plan for discharge, as there is no emergent indication for admission. Patient referred to primary care provider for BP management due to today's BP. Pt/family is agreeable and understands need for follow up and repeat testing.  Pt is aware that discharge does not mean that nothing is wrong but it indicates no emergency is present that requires admission and they must continue care with follow-up as given below or physician of their  choice.     FOLLOW-UP  [facility] Emergency Department  [address]  [facility] [address] [phone]    As needed, If symptoms worsen    PATIENT CONNECTION - [place] [address]  [phone]  Call   To establish PCP as needed         Medication List      No changes were made to your prescriptions during this visit.                    Brando Lainez MD  04/12/21 8948

## 2021-04-21 ENCOUNTER — DOCUMENTATION (OUTPATIENT)
Dept: PHYSICAL THERAPY | Facility: CLINIC | Age: 41
End: 2021-04-21

## 2021-09-02 ENCOUNTER — OFFICE VISIT (OUTPATIENT)
Dept: INTERNAL MEDICINE | Facility: CLINIC | Age: 41
End: 2021-09-02

## 2021-09-02 VITALS
TEMPERATURE: 97.3 F | RESPIRATION RATE: 16 BRPM | HEIGHT: 71 IN | DIASTOLIC BLOOD PRESSURE: 96 MMHG | SYSTOLIC BLOOD PRESSURE: 158 MMHG | HEART RATE: 68 BPM | WEIGHT: 243 LBS | OXYGEN SATURATION: 98 % | BODY MASS INDEX: 34.02 KG/M2

## 2021-09-02 DIAGNOSIS — G89.29 BILATERAL CHRONIC KNEE PAIN: ICD-10-CM

## 2021-09-02 DIAGNOSIS — M25.561 BILATERAL CHRONIC KNEE PAIN: ICD-10-CM

## 2021-09-02 DIAGNOSIS — I10 ESSENTIAL HYPERTENSION: Primary | ICD-10-CM

## 2021-09-02 DIAGNOSIS — K21.9 GASTROESOPHAGEAL REFLUX DISEASE WITHOUT ESOPHAGITIS: ICD-10-CM

## 2021-09-02 DIAGNOSIS — M25.562 BILATERAL CHRONIC KNEE PAIN: ICD-10-CM

## 2021-09-02 PROCEDURE — 99214 OFFICE O/P EST MOD 30 MIN: CPT | Performed by: NURSE PRACTITIONER

## 2021-09-02 RX ORDER — NADOLOL 20 MG/1
20 TABLET ORAL DAILY
Qty: 90 TABLET | Refills: 1 | Status: SHIPPED | OUTPATIENT
Start: 2021-09-02 | End: 2022-04-22 | Stop reason: SDUPTHER

## 2021-09-02 RX ORDER — OMEPRAZOLE 40 MG/1
40 CAPSULE, DELAYED RELEASE ORAL DAILY
Qty: 90 CAPSULE | Refills: 1 | Status: SHIPPED | OUTPATIENT
Start: 2021-09-02 | End: 2021-12-07 | Stop reason: ALTCHOICE

## 2021-09-02 NOTE — PROGRESS NOTES
Subjective   Arsenio Beard is a 40 y.o. male. Patient is here today for   Chief Complaint   Patient presents with   • Knee Pain   • Heartburn   • Hypertension   Answers for HPI/ROS submitted by the patient on 9/2/2021  What is the primary reason for your visit?: Other  Please describe your symptoms.: High blood pressure , Acid reflux , Numbness in my right arm, Severe pain in both my knees (especially my right knee), Possible high blood sugar  Have you had these symptoms before?: Yes  How long have you been having these symptoms?: Greater than 2 weeks  Please list any medications you are currently taking for this condition.: None  Please describe any probable cause for these symptoms. 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           Vitals:    09/02/21 1302   BP: 158/96   Pulse: 68   Resp: 16   Temp: 97.3 °F (36.3 °C)   SpO2: 98%     Body mass index is 33.89 kg/m².  The following portions of the patient's history were reviewed and updated as appropriate: allergies, current medications, past family history, past medical history, past social history, past surgical history and problem list.    Past Medical History:   Diagnosis Date   • At risk for sleep apnea     SLEEP STUDY WAS NEGATIVE   • Depression 06/1982   • GERD (gastroesophageal reflux disease)    • Hypertension    • Low back pain    • Obesity 06/2003   • Right groin pain    • Seasonal allergies    • Visual impairment 01/1986      No Known Allergies   Social History     Socioeconomic History   • Marital status: Single     Spouse name: Not on file   • Number of children: Not on file   • Years of education: Not on file   • Highest education level: Not on file   Tobacco Use   • Smoking  status: Never Smoker   • Smokeless tobacco: Never Used   Vaping Use   • Vaping Use: Never used   Substance and Sexual Activity   • Alcohol use: Never   • Drug use: Never     Comment: STEROID USE X3 YEARS/5726-8303   • Sexual activity: Not Currently     Partners: Female     Birth control/protection: None     Comment: Am a [employer]. Abstained from sexual intimacy since 2006        Current Outpatient Medications:   •  fluticasone (FLONASE) 50 MCG/ACT nasal spray, 2 sprays into the nostril(s) as directed by provider Daily., Disp: , Rfl:   •  nadolol (CORGARD) 20 MG tablet, Take 1 tablet by mouth Daily., Disp: 90 tablet, Rfl: 1  •  omeprazole (priLOSEC) 40 MG capsule, Take 1 capsule by mouth Daily., Disp: 90 capsule, Rfl: 1     Objective     History of Present Illness  Arsenio is a 40 year old male patient who is here to discuss multiple complaints. He has HTN and stopped his medication a few months ago due to not having insurance. He recently got insurance and would like to restart his medication. He is not checking his BP at home  He has GERD and has not seen gastro yet. He stopped taking omeprazole.   He also has chronic bilaterally knee pain for the last 2 years. He denies any injury. It is worse with exercise and walking. He take motrin and tylenol as needed.   He has an MRI of the lumbar spine scheduled through [facility] spine.       Review of Systems   Eyes: Negative for visual disturbance.   Respiratory: Negative.    Cardiovascular: Negative.    Gastrointestinal:        Heartburn    Musculoskeletal: Positive for back pain.        Knee pain    Neurological: Positive for numbness (numbnessin right arm for 2 weeks) and headaches (occasional ). Negative for dizziness and weakness.       Physical Exam  Vitals and nursing note reviewed.   Constitutional:       Appearance: Normal appearance. He is well-developed and well-groomed.   HENT:      Head: Normocephalic.   Cardiovascular:      Rate and Rhythm: Normal  rate and regular rhythm.   Pulmonary:      Effort: Pulmonary effort is normal.      Breath sounds: Normal breath sounds.   Musculoskeletal:      Right shoulder: Normal range of motion. Normal strength. Normal pulse.      Right upper arm: No swelling, edema or tenderness.      Right knee: Swelling and deformity present. Tenderness present.      Left knee: No swelling.   Neurological:      Mental Status: He is alert.         ASSESSMENT     Problems Addressed this Visit     Essential hypertension - Primary    Relevant Medications    nadolol (CORGARD) 20 MG tablet      Other Visit Diagnoses     Gastroesophageal reflux disease without esophagitis        Relevant Medications    omeprazole (priLOSEC) 40 MG capsule    Other Relevant Orders    Ambulatory Referral to Gastroenterology    Bilateral chronic knee pain        Relevant Orders    Ambulatory Referral to Orthopedic Surgery      Diagnoses       Codes Comments    Essential hypertension    -  Primary ICD-10-CM: I10  ICD-9-CM: 401.9     Gastroesophageal reflux disease without esophagitis     ICD-10-CM: K21.9  ICD-9-CM: 530.81     Bilateral chronic knee pain     ICD-10-CM: M25.561, M25.562, G89.29  ICD-9-CM: 719.46, 338.29           PLAN  HTN- will restart nadolol , monitor BP at home  GERD- referral placed for gastro, omeprazole restarted, avoid caffeine, spicy or greasy foods  Will refer to Ortho for chronic knee pain  Discussed getting an EMG/NCS on right arm for c/o numbness, patient will follow up with ortho at [facility] to see if they want to add an MRI of the cpsine also- has lumbar MRI scheduled in 2 weeks   Follow up for CPE with labs in 2 weeks as scheduled or sooner if needed

## 2021-09-07 ENCOUNTER — OFFICE VISIT (OUTPATIENT)
Dept: ORTHOPEDIC SURGERY | Facility: CLINIC | Age: 41
End: 2021-09-07

## 2021-09-07 VITALS — TEMPERATURE: 97.5 F | HEIGHT: 71 IN | WEIGHT: 243 LBS | BODY MASS INDEX: 34.02 KG/M2

## 2021-09-07 DIAGNOSIS — M17.10 PATELLOFEMORAL ARTHRITIS: ICD-10-CM

## 2021-09-07 DIAGNOSIS — R52 PAIN: Primary | ICD-10-CM

## 2021-09-07 DIAGNOSIS — S83.249A TEAR OF MEDIAL MENISCUS OF KNEE, CURRENT, UNSPECIFIED LATERALITY, UNSPECIFIED TEAR TYPE, INITIAL ENCOUNTER: ICD-10-CM

## 2021-09-07 PROCEDURE — 99203 OFFICE O/P NEW LOW 30 MIN: CPT | Performed by: ORTHOPAEDIC SURGERY

## 2021-09-07 PROCEDURE — 73562 X-RAY EXAM OF KNEE 3: CPT | Performed by: ORTHOPAEDIC SURGERY

## 2021-09-07 RX ORDER — IBUPROFEN 800 MG/1
800 TABLET ORAL 3 TIMES DAILY
Qty: 90 TABLET | Refills: 0 | Status: SHIPPED | OUTPATIENT
Start: 2021-09-07 | End: 2021-10-11

## 2021-09-07 RX ORDER — ACETAMINOPHEN 160 MG/5ML
500 SOLUTION ORAL EVERY 4 HOURS PRN
COMMUNITY
End: 2021-10-26

## 2021-09-07 RX ORDER — IBUPROFEN 800 MG/1
800 TABLET ORAL EVERY 6 HOURS PRN
COMMUNITY
End: 2021-10-11

## 2021-09-07 NOTE — PROGRESS NOTES
New Bilateral Knees      Patient: Arsenio Beard        YOB: 1980    Medical Record Number: [id number]        Chief Complaints: bilateral knee pain      History of Present Illness: This is a 40-year-old male who presents planing of bilateral knee pain right greater than left is been ongoing for years states in 2019 it got a lot worse he was biking a lot and he had a back injury at work and has been off since February of this year.  He states his pain is medially and laterally it is mostly with lateral movement he has no night pain symptoms are medial and anterior they are mild to moderate intermittent 7 out of 10 aching clicking worse with walking running somewhat better with rest past medical history is marked for reflux hypertension sleep apnea he has a BMI of 33      Allergies: No Known Allergies    Medications:   Home Medications:  Current Outpatient Medications on File Prior to Visit   Medication Sig   • fluticasone (FLONASE) 50 MCG/ACT nasal spray 2 sprays into the nostril(s) as directed by provider Daily.   • nadolol (CORGARD) 20 MG tablet Take 1 tablet by mouth Daily.   • omeprazole (priLOSEC) 40 MG capsule Take 1 capsule by mouth Daily.   • acetaminophen (TYLENOL) 160 MG/5ML solution Take 500 mg by mouth Every 4 (Four) Hours As Needed for Mild Pain .   • ibuprofen (ADVIL,MOTRIN) 800 MG tablet Take 800 mg by mouth Every 6 (Six) Hours As Needed for Mild Pain .     No current facility-administered medications on file prior to visit.     Current Medications:  Scheduled Meds:  Continuous Infusions:No current facility-administered medications for this visit.    PRN Meds:.    Past Medical History:   Diagnosis Date   • At risk for sleep apnea     SLEEP STUDY WAS NEGATIVE   • Depression 06/1982   • GERD (gastroesophageal reflux disease)    • Hypertension    • Low back pain    • Obesity 06/2003   • Right groin pain    • Seasonal allergies    • Visual impairment 01/1986        Past Surgical History:  "  Procedure Laterality Date   • FACIAL RECONSTRUCTION SURGERY      as a child   • INGUINAL HERNIA REPAIR Right 2/23/2021    Procedure: right INGUINAL HERNIA REPAIR LAPAROSCOPIC WITH DAVINCI ROBOT;  Surgeon: Hiram Ocampo MD;  Location: [facility];  Service: [facility];  Laterality: Right;   • INGUINAL HERNIA REPAIR  02/23/2021        Social History     Occupational History   • Not on file   Tobacco Use   • Smoking status: Never Smoker   • Smokeless tobacco: Never Used   Vaping Use   • Vaping Use: Never used   Substance and Sexual Activity   • Alcohol use: Never   • Drug use: Never     Comment: STEROID USE X3 YEARS/5114-7807   • Sexual activity: Not Currently     Partners: Female     Birth control/protection: None     Comment: Am a [employer]. Abstained from sexual intimacy since 2006      Social History     Social History Narrative   • Not on file        Family History   Problem Relation Age of Onset   • Heart disease Maternal Grandmother    • Heart attack Maternal Grandmother    • Malig Hyperthermia Neg Hx              Review of Systems: 14 point review of systems remarkable for the knee pain only the remainder negative per the patient    Review of Systems      Physical Exam: 40 y.o. male  General Appearance:    Alert, cooperative, in no acute distress                   Vitals:    09/07/21 1320   Temp: 97.5 °F (36.4 °C)   Weight: 110 kg (243 lb)   Height: 180.3 cm (71\")   PainSc:   2      Patient is alert and read ×3 no acute distress appears her above-listed at height weight and age.  Affect is normal respiratory rate is normal unlabored. Heart rate regular rate rhythm, sclera, dentition and hearing are normal for the purpose of this exam.        Ortho Exam Physical exam of the right  knee reveals no effusion no redness.  The patient does have tenderness about the medial l joint line.  No tenderness about the lateral l joint line.  A negative bounce home and a positive l medial Doreen.    Patient has a " stable ligamentous exam.  The patient has a negative Lachman and negative anterior drawer and a negative pivot shift.  Quads are reasonable and symmetric bilaterally.  Calf is soft and nontender.  There is no overlying skin changes no lymphedema lymphadenopathy.  Patient has good hip range of motion full symmetric and asymptomatic and a normal ankle exam.  She has good distal pulses and sensation distally is intact    Physical exam of the left knee reveals no effusion, no erythema.  The patient has no palpable tenderness along the medial joint line, no tenderness about the lateral joint line.  Patient does have crepitus with patellofemoral range of motion.  They also have subjective symptoms anteriorly during exam.  The patient has a negative bounce home, negative Doreen and a stable ligamentous exam.  Quad tone is reasonable and symmetric.  There are no overlying skin changes no lymphedema no lymphadenopathy.  There is good hip range of motion which is full symmetric and asymptomatic and a normal ankle exam.  Hamstrings and IT band are tight bilaterally.  He does have what appears to be a small lipoma about the size of a marble just lateral to his patellar tendon he states its been there as long as he can remember it has not changed        Procedures             Radiology:   AP, Lateral and merchant views of the right and left knee  were ordered/reviewed to evauateknee pain.  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 X-ray performed in the DR room.          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

## 2021-09-09 DIAGNOSIS — Z11.59 NEED FOR HEPATITIS C SCREENING TEST: ICD-10-CM

## 2021-09-09 DIAGNOSIS — Z00.00 ROUTINE HEALTH MAINTENANCE: Primary | ICD-10-CM

## 2021-09-15 LAB
ALBUMIN SERPL-MCNC: 4.5 G/DL (ref 3.5–5.2)
ALBUMIN/GLOB SERPL: 1.7 G/DL
ALP SERPL-CCNC: 53 U/L (ref 39–117)
ALT SERPL-CCNC: 44 U/L (ref 1–41)
APPEARANCE UR: CLEAR
AST SERPL-CCNC: 20 U/L (ref 1–40)
BACTERIA #/AREA URNS HPF: NORMAL /HPF
BASOPHILS # BLD AUTO: 0.04 10*3/MM3 (ref 0–0.2)
BASOPHILS NFR BLD AUTO: 0.4 % (ref 0–1.5)
BILIRUB SERPL-MCNC: 0.5 MG/DL (ref 0–1.2)
BILIRUB UR QL STRIP: NEGATIVE
BUN SERPL-MCNC: 18 MG/DL (ref 6–20)
BUN/CREAT SERPL: 15.8 (ref 7–25)
CALCIUM SERPL-MCNC: 9.4 MG/DL (ref 8.6–10.5)
CASTS URNS MICRO: NORMAL
CHLORIDE SERPL-SCNC: 105 MMOL/L (ref 98–107)
CHOLEST SERPL-MCNC: 157 MG/DL (ref 0–200)
CO2 SERPL-SCNC: 26.4 MMOL/L (ref 22–29)
COLOR UR: YELLOW
CREAT SERPL-MCNC: 1.14 MG/DL (ref 0.76–1.27)
EOSINOPHIL # BLD AUTO: 0.2 10*3/MM3 (ref 0–0.4)
EOSINOPHIL NFR BLD AUTO: 2.2 % (ref 0.3–6.2)
EPI CELLS #/AREA URNS HPF: NORMAL /HPF
ERYTHROCYTE [DISTWIDTH] IN BLOOD BY AUTOMATED COUNT: 13.1 % (ref 12.3–15.4)
GLOBULIN SER CALC-MCNC: 2.6 GM/DL
GLUCOSE SERPL-MCNC: 116 MG/DL (ref 65–99)
GLUCOSE UR QL: NEGATIVE
HCT VFR BLD AUTO: 45.8 % (ref 37.5–51)
HCV AB S/CO SERPL IA: <0.1 S/CO RATIO (ref 0–0.9)
HCV AB SERPL QL IA: NORMAL
HDLC SERPL-MCNC: 34 MG/DL (ref 40–60)
HGB BLD-MCNC: 15.3 G/DL (ref 13–17.7)
HGB UR QL STRIP: NEGATIVE
IMM GRANULOCYTES # BLD AUTO: 0.04 10*3/MM3 (ref 0–0.05)
IMM GRANULOCYTES NFR BLD AUTO: 0.4 % (ref 0–0.5)
KETONES UR QL STRIP: NEGATIVE
LDLC SERPL CALC-MCNC: 87 MG/DL (ref 0–100)
LDLC/HDLC SERPL: 2.35 {RATIO}
LEUKOCYTE ESTERASE UR QL STRIP: NEGATIVE
LYMPHOCYTES # BLD AUTO: 2.19 10*3/MM3 (ref 0.7–3.1)
LYMPHOCYTES NFR BLD AUTO: 24.6 % (ref 19.6–45.3)
MCH RBC QN AUTO: 29.7 PG (ref 26.6–33)
MCHC RBC AUTO-ENTMCNC: 33.4 G/DL (ref 31.5–35.7)
MCV RBC AUTO: 88.8 FL (ref 79–97)
MONOCYTES # BLD AUTO: 0.61 10*3/MM3 (ref 0.1–0.9)
MONOCYTES NFR BLD AUTO: 6.8 % (ref 5–12)
NEUTROPHILS # BLD AUTO: 5.83 10*3/MM3 (ref 1.7–7)
NEUTROPHILS NFR BLD AUTO: 65.6 % (ref 42.7–76)
NITRITE UR QL STRIP: NEGATIVE
NRBC BLD AUTO-RTO: 0 /100 WBC (ref 0–0.2)
PH UR STRIP: 8.5 [PH] (ref 5–8)
PLATELET # BLD AUTO: 211 10*3/MM3 (ref 140–450)
POTASSIUM SERPL-SCNC: 4.4 MMOL/L (ref 3.5–5.2)
PROT SERPL-MCNC: 7.1 G/DL (ref 6–8.5)
PROT UR QL STRIP: NEGATIVE
RBC # BLD AUTO: 5.16 10*6/MM3 (ref 4.14–5.8)
RBC #/AREA URNS HPF: NORMAL /HPF
SODIUM SERPL-SCNC: 143 MMOL/L (ref 136–145)
SP GR UR: 1.02 (ref 1–1.03)
TRIGL SERPL-MCNC: 215 MG/DL (ref 0–150)
TSH SERPL DL<=0.005 MIU/L-ACNC: 0.91 UIU/ML (ref 0.27–4.2)
UROBILINOGEN UR STRIP-MCNC: ABNORMAL MG/DL
VLDLC SERPL CALC-MCNC: 36 MG/DL (ref 5–40)
WBC # BLD AUTO: 8.91 10*3/MM3 (ref 3.4–10.8)
WBC #/AREA URNS HPF: NORMAL /HPF

## 2021-09-29 ENCOUNTER — TELEPHONE (OUTPATIENT)
Dept: ORTHOPEDIC SURGERY | Facility: CLINIC | Age: 41
End: 2021-09-29

## 2021-09-29 ENCOUNTER — APPOINTMENT (OUTPATIENT)
Dept: MRI IMAGING | Facility: HOSPITAL | Age: 41
End: 2021-09-29

## 2021-09-29 NOTE — TELEPHONE ENCOUNTER
Caller: NINA CASON    Relationship: SELF      Medication requested (name and dosage):   ibuprofen (ADVIL,MOTRIN) 800 MG tablet    Pharmacy where request should be sent: [facility] [place] [id number] [place] [id number] - [phone] [facility] [phone]       Additional details provided by patient: PATIENT IS NOT SURE THAT HE NEEDS THE REFILL. HE SAW DR DOTSON ON 9/7/2021 AND WILL SOON RUN OUT. HE HAS AN APPT TO SEE DR DOTSON ON 10/28/2021. HE JUST WANTS TO MAKE SURE THAT HE DOES THE CORRECT THING.       PATIENT WILL BE OUT OF TOWN UNTIL Saturday. MARKING MESSAGE AS ROUTINE EVEN THOUGH PATIENT WILL BE OUT OF MEDICATION BEFORE THEN.       Best call back number: [phone]    Does the patient have less than a 3 day supply:  [x] Yes  [] No    Mauricio Clifford Rep   09/29/21 14:13 EDT

## 2021-09-30 RX ORDER — IBUPROFEN 800 MG/1
800 TABLET ORAL 3 TIMES DAILY
Qty: 90 TABLET | Refills: 0 | Status: SHIPPED | OUTPATIENT
Start: 2021-09-30 | End: 2021-10-11

## 2021-09-30 NOTE — TELEPHONE ENCOUNTER
I sent another one in if he feels like he does not need it and can wean off of it he can stop if he feels like it is giving him some benefit he can start weaning off of it such as going twice a day instead of 3 times a day

## 2021-10-11 ENCOUNTER — OFFICE VISIT (OUTPATIENT)
Dept: INTERNAL MEDICINE | Facility: CLINIC | Age: 41
End: 2021-10-11

## 2021-10-11 VITALS
BODY MASS INDEX: 34.86 KG/M2 | HEIGHT: 71 IN | SYSTOLIC BLOOD PRESSURE: 144 MMHG | WEIGHT: 249 LBS | TEMPERATURE: 97.7 F | DIASTOLIC BLOOD PRESSURE: 70 MMHG | RESPIRATION RATE: 18 BRPM | OXYGEN SATURATION: 98 % | HEART RATE: 64 BPM

## 2021-10-11 DIAGNOSIS — Z00.00 ANNUAL PHYSICAL EXAM: Primary | ICD-10-CM

## 2021-10-11 PROCEDURE — 99396 PREV VISIT EST AGE 40-64: CPT | Performed by: NURSE PRACTITIONER

## 2021-10-11 RX ORDER — IBUPROFEN 800 MG/1
800 TABLET ORAL EVERY 6 HOURS PRN
Status: ON HOLD | COMMUNITY
End: 2021-10-27 | Stop reason: SDUPTHER

## 2021-10-11 NOTE — PROGRESS NOTES
Subjective   Arsenio Beard is a 40 y.o. male. Patient is here today for   Chief Complaint   Patient presents with   • Annual Exam          Vitals:    10/11/21 0808   BP: 144/70   Pulse: 64   Resp: 18   Temp: 97.7 °F (36.5 °C)   SpO2: 98%     Body mass index is 34.73 kg/m².    The following portions of the patient's history were reviewed and updated as appropriate: allergies, current medications, past family history, past medical history, past social history, past surgical history and problem list.    Past Medical History:   Diagnosis Date   • At risk for sleep apnea     SLEEP STUDY WAS NEGATIVE   • Depression 06/1982   • GERD (gastroesophageal reflux disease)    • Hypertension    • Low back pain    • Obesity 06/2003   • Right groin pain    • Seasonal allergies    • Visual impairment 01/1986      No Known Allergies   Social History     Socioeconomic History   • Marital status: Single   Tobacco Use   • Smoking status: Never Smoker   • Smokeless tobacco: Never Used   Vaping Use   • Vaping Use: Never used   Substance and Sexual Activity   • Alcohol use: Never   • Drug use: Never     Comment: STEROID USE X3 YEARS/1639-2350   • Sexual activity: Not Currently     Partners: Female     Birth control/protection: None     Comment: Am a Spiritism. Abstained from sexual intimacy since 2006        Current Outpatient Medications:   •  acetaminophen (TYLENOL) 160 MG/5ML solution, Take 500 mg by mouth Every 4 (Four) Hours As Needed for Mild Pain ., Disp: , Rfl:   •  fluticasone (FLONASE) 50 MCG/ACT nasal spray, 2 sprays into the nostril(s) as directed by provider Daily., Disp: , Rfl:   •  ibuprofen (ADVIL,MOTRIN) 800 MG tablet, Take 800 mg by mouth Every 6 (Six) Hours As Needed for Mild Pain ., Disp: , Rfl:   •  nadolol (CORGARD) 20 MG tablet, Take 1 tablet by mouth Daily., Disp: 90 tablet, Rfl: 1  •  omeprazole (priLOSEC) 40 MG capsule, Take 1 capsule by mouth Daily., Disp: 90 capsule, Rfl: 1         Objective   History of  Present Illness   Arsenio Beard 40 y.o. male who presents for an Annual Wellness Visit.  he has a history of   Patient Active Problem List   Diagnosis   • Precordial pain   • Essential hypertension   • Right inguinal hernia   .  he has been doing well with no interval problems. He has been seeing neurosurgery at [facility] for chronic neck pain.    Labs results discussed in detail with the patient.  Plan to update vaccines if needed today.    Health Habits:  Dental Exam. not up to date - .  Eye Exam. up to date  Exercise: 0 times/week.  Current exercise activities include: none  Diet is unhealthy   Denies risk for STD     Lab Results (most recent)     No visits with results within 1 Month(s) from this visit.   Latest known visit with results is:   Orders Only on 09/09/2021   Component Date Value Ref Range Status   • WBC 09/14/2021 8.91  3.40 - 10.80 10*3/mm3 Final   • RBC 09/14/2021 5.16  4.14 - 5.80 10*6/mm3 Final   • Hemoglobin 09/14/2021 15.3  13.0 - 17.7 g/dL Final   • Hematocrit 09/14/2021 45.8  37.5 - 51.0 % Final   • MCV 09/14/2021 88.8  79.0 - 97.0 fL Final   • MCH 09/14/2021 29.7  26.6 - 33.0 pg Final   • MCHC 09/14/2021 33.4  31.5 - 35.7 g/dL Final   • RDW 09/14/2021 13.1  12.3 - 15.4 % Final   • Platelets 09/14/2021 211  140 - 450 10*3/mm3 Final   • Neutrophil Rel % 09/14/2021 65.6  42.7 - 76.0 % Final   • Lymphocyte Rel % 09/14/2021 24.6  19.6 - 45.3 % Final   • Monocyte Rel % 09/14/2021 6.8  5.0 - 12.0 % Final   • Eosinophil Rel % 09/14/2021 2.2  0.3 - 6.2 % Final   • Basophil Rel % 09/14/2021 0.4  0.0 - 1.5 % Final   • Neutrophils Absolute 09/14/2021 5.83  1.70 - 7.00 10*3/mm3 Final   • Lymphocytes Absolute 09/14/2021 2.19  0.70 - 3.10 10*3/mm3 Final   • Monocytes Absolute 09/14/2021 0.61  0.10 - 0.90 10*3/mm3 Final   • Eosinophils Absolute 09/14/2021 0.20  0.00 - 0.40 10*3/mm3 Final   • Basophils Absolute 09/14/2021 0.04  0.00 - 0.20 10*3/mm3 Final   • Immature Granulocyte Rel % 09/14/2021 0.4  0.0 -  0.5 % Final   • Immature Grans Absolute 09/14/2021 0.04  0.00 - 0.05 10*3/mm3 Final   • nRBC 09/14/2021 0.0  0.0 - 0.2 /100 WBC Final   • Glucose 09/14/2021 116* 65 - 99 mg/dL Final   • BUN 09/14/2021 18  6 - 20 mg/dL Final   • Creatinine 09/14/2021 1.14  0.76 - 1.27 mg/dL Final   • eGFR Non  Am 09/14/2021 70  >60 mL/min/1.73 Final    Comment: GFR Normal >60  Chronic Kidney Disease <60  Kidney Failure <15     • eGFR  Am 09/14/2021 85  >60 mL/min/1.73 Final   • BUN/Creatinine Ratio 09/14/2021 15.8  7.0 - 25.0 Final   • Sodium 09/14/2021 143  136 - 145 mmol/L Final   • Potassium 09/14/2021 4.4  3.5 - 5.2 mmol/L Final   • Chloride 09/14/2021 105  98 - 107 mmol/L Final   • Total CO2 09/14/2021 26.4  22.0 - 29.0 mmol/L Final   • Calcium 09/14/2021 9.4  8.6 - 10.5 mg/dL Final   • Total Protein 09/14/2021 7.1  6.0 - 8.5 g/dL Final   • Albumin 09/14/2021 4.50  3.50 - 5.20 g/dL Final   • Globulin 09/14/2021 2.6  gm/dL Final   • A/G Ratio 09/14/2021 1.7  g/dL Final   • Total Bilirubin 09/14/2021 0.5  0.0 - 1.2 mg/dL Final   • Alkaline Phosphatase 09/14/2021 53  39 - 117 U/L Final   • AST (SGOT) 09/14/2021 20  1 - 40 U/L Final   • ALT (SGPT) 09/14/2021 44* 1 - 41 U/L Final   • Total Cholesterol 09/14/2021 157  0 - 200 mg/dL Final    Comment: Cholesterol Reference Ranges  (U.S. Department of Health and Human Services ATP III  Classifications)  Desirable          <200 mg/dL  Borderline High    200-239 mg/dL  High Risk          >240 mg/dL  Triglyceride Reference Ranges  (U.S. Department of Health and Human Services ATP III  Classifications)  Normal           <150 mg/dL  Borderline High  150-199 mg/dL  High             200-499 mg/dL  Very High        >500 mg/dL  HDL Reference Ranges  (U.S. Department of Health and Human Services ATP III  Classifcations)  Low     <40 mg/dl (major risk factor for CHD)  High    >60 mg/dl ('negative' risk factor for CHD)  LDL Reference Ranges  (U.S. Department of Health and Human  Services ATP III  Classifcations)  Optimal          <100 mg/dL  Near Optimal     100-129 mg/dL  Borderline High  130-159 mg/dL  High             160-189 mg/dL  Very High        >189 mg/dL     • Triglycerides 09/14/2021 215* 0 - 150 mg/dL Final   • HDL Cholesterol 09/14/2021 34* 40 - 60 mg/dL Final   • VLDL Cholesterol Oumar 09/14/2021 36  5 - 40 mg/dL Final   • LDL Chol Calc (NIH) 09/14/2021 87  0 - 100 mg/dL Final   • LDL/HDL RATIO 09/14/2021 2.35   Final   • Hepatitis C Ab 09/14/2021 <0.1  0.0 - 0.9 s/co ratio Final   • TSH 09/14/2021 0.913  0.270 - 4.200 uIU/mL Final   • Specific Gravity, UA 09/14/2021 1.019  1.005 - 1.030 Final   • pH, UA 09/14/2021 8.5* 5.0 - 8.0 Final   • Color, UA 09/14/2021 Yellow   Final    REFERENCE RANGE: Yellow, Straw   • Appearance, UA 09/14/2021 Clear  Clear Final   • Leukocytes, UA 09/14/2021 Negative  Negative Final   • Protein 09/14/2021 Negative  Negative Final   • Glucose, UA 09/14/2021 Negative  Negative Final   • Ketones 09/14/2021 Negative  Negative Final   • Blood, UA 09/14/2021 Negative  Negative Final   • Bilirubin, UA 09/14/2021 Negative  Negative Final   • Urobilinogen, UA 09/14/2021 Comment   Final    Comment: 0.2 E.U./dL  REFERENCE RANGE: 0.2 - 1.0 E.U./dL     • Nitrite, UA 09/14/2021 Negative  Negative Final   • WBC, UA 09/14/2021 0-2  /HPF Final    REFERENCE RANGE: None Seen, 0-2   • RBC, UA 09/14/2021 0-2  /HPF Final    REFERENCE RANGE: None Seen, 0-2   • Epithelial Cells (non renal) 09/14/2021 0-2  /HPF Final    REFERENCE RANGE: None Seen, 0-2   • Cast Type 09/14/2021 Comment   Final    HYALINE CASTS, UA            None Seen        /LPF       None Seen  N   • Bacteria, UA 09/14/2021 Comment  None Seen /HPF Final    None Seen   • Interpretation 09/14/2021 Comment   Final    Comment: Negative  Not infected with HCV, unless recent infection is suspected or other  evidence exists to indicate HCV infection.                   Review of Systems   Constitutional: Negative for  fatigue.   HENT: Negative.    Respiratory: Negative.    Cardiovascular: Negative.    Gastrointestinal: Negative.    Genitourinary: Negative.    Musculoskeletal: Positive for arthralgias, back pain and neck pain.   Neurological: Negative.    Psychiatric/Behavioral: Negative.        Physical Exam  Vitals and nursing note reviewed.   Constitutional:       General: He is not in acute distress.     Appearance: Normal appearance. He is well-developed and well-groomed.   HENT:      Head: Normocephalic.      Right Ear: Tympanic membrane and ear canal normal.      Left Ear: Tympanic membrane and ear canal normal.   Eyes:      General: Lids are normal.      Comments: Wearing glasses    Neck:      Thyroid: No thyromegaly.   Cardiovascular:      Rate and Rhythm: Normal rate and regular rhythm.   Pulmonary:      Effort: Pulmonary effort is normal.      Breath sounds: Normal breath sounds.   Musculoskeletal:      Cervical back: Neck supple.   Skin:     General: Skin is warm and dry.   Neurological:      Mental Status: He is alert and oriented to person, place, and time.   Psychiatric:         Mood and Affect: Mood normal.         ASSESSMENT       Problems Addressed this Visit     None      Visit Diagnoses     Annual physical exam    -  Primary      Diagnoses       Codes Comments    Annual physical exam    -  Primary ICD-10-CM: Z00.00  ICD-9-CM: V70.0           PLAN    BMI is 37 recommend dietary changes and exercise  Fasting glucose 116, decrease sugar and starch  Declined flu and covid vaccines  Follow up with neurosurgery for neck/back pain  BP is slightly elevated today- continue current medication and check BP at home  He has not had a dental exam in 15 years , recommend scheduling with a dentist in pass\[facility]\"" network   Return in about 6 months (around 4/11/2022) for Recheck, with labs cmp a1c.

## 2021-10-15 ENCOUNTER — HOSPITAL ENCOUNTER (OUTPATIENT)
Dept: MRI IMAGING | Facility: HOSPITAL | Age: 41
Discharge: HOME | End: 2021-10-15
Admitting: ORTHOPAEDIC SURGERY

## 2021-10-15 DIAGNOSIS — S83.249A TEAR OF MEDIAL MENISCUS OF KNEE, CURRENT, UNSPECIFIED LATERALITY, UNSPECIFIED TEAR TYPE, INITIAL ENCOUNTER: ICD-10-CM

## 2021-10-15 DIAGNOSIS — R22.41 KNEE MASS, RIGHT: ICD-10-CM

## 2021-10-15 DIAGNOSIS — R52 PAIN: Primary | ICD-10-CM

## 2021-10-15 PROCEDURE — 73721 MRI JNT OF LWR EXTRE W/O DYE: CPT

## 2021-10-21 DIAGNOSIS — R22.41 KNEE MASS, RIGHT: Primary | ICD-10-CM

## 2021-10-21 RX ORDER — CEFAZOLIN SODIUM 2 G/100ML
2 INJECTION, SOLUTION INTRAVENOUS ONCE
Status: CANCELLED | OUTPATIENT
Start: 2021-10-21 | End: 2021-10-21

## 2021-10-26 ENCOUNTER — HOSPITAL ENCOUNTER (OUTPATIENT)
Dept: GENERAL RADIOLOGY | Facility: HOSPITAL | Age: 41
Discharge: HOME | End: 2021-10-26

## 2021-10-26 ENCOUNTER — PRE-ADMISSION TESTING (OUTPATIENT)
Dept: PREADMISSION TESTING | Facility: HOSPITAL | Age: 41
End: 2021-10-26

## 2021-10-26 VITALS
OXYGEN SATURATION: 96 % | HEIGHT: 72 IN | BODY MASS INDEX: 33.31 KG/M2 | HEART RATE: 61 BPM | TEMPERATURE: 97.8 F | DIASTOLIC BLOOD PRESSURE: 87 MMHG | RESPIRATION RATE: 16 BRPM | SYSTOLIC BLOOD PRESSURE: 132 MMHG | WEIGHT: 245.9 LBS

## 2021-10-26 LAB
ANION GAP SERPL CALCULATED.3IONS-SCNC: 10.9 MMOL/L (ref 5–15)
BASOPHILS # BLD AUTO: 0.03 10*3/MM3 (ref 0–0.2)
BASOPHILS NFR BLD AUTO: 0.4 % (ref 0–1.5)
BUN SERPL-MCNC: 12 MG/DL (ref 6–20)
BUN/CREAT SERPL: 12.6 (ref 7–25)
CALCIUM SPEC-SCNC: 9 MG/DL (ref 8.6–10.5)
CHLORIDE SERPL-SCNC: 105 MMOL/L (ref 98–107)
CO2 SERPL-SCNC: 24.1 MMOL/L (ref 22–29)
CREAT SERPL-MCNC: 0.95 MG/DL (ref 0.76–1.27)
DEPRECATED RDW RBC AUTO: 43.2 FL (ref 37–54)
EOSINOPHIL # BLD AUTO: 0.17 10*3/MM3 (ref 0–0.4)
EOSINOPHIL NFR BLD AUTO: 2.4 % (ref 0.3–6.2)
ERYTHROCYTE [DISTWIDTH] IN BLOOD BY AUTOMATED COUNT: 13.2 % (ref 12.3–15.4)
GFR SERPL CREATININE-BSD FRML MDRD: 87 ML/MIN/1.73
GLUCOSE SERPL-MCNC: 117 MG/DL (ref 65–99)
HCT VFR BLD AUTO: 43.4 % (ref 37.5–51)
HGB BLD-MCNC: 14.6 G/DL (ref 13–17.7)
IMM GRANULOCYTES # BLD AUTO: 0.02 10*3/MM3 (ref 0–0.05)
IMM GRANULOCYTES NFR BLD AUTO: 0.3 % (ref 0–0.5)
LYMPHOCYTES # BLD AUTO: 2.24 10*3/MM3 (ref 0.7–3.1)
LYMPHOCYTES NFR BLD AUTO: 31.9 % (ref 19.6–45.3)
MCH RBC QN AUTO: 29.9 PG (ref 26.6–33)
MCHC RBC AUTO-ENTMCNC: 33.6 G/DL (ref 31.5–35.7)
MCV RBC AUTO: 88.9 FL (ref 79–97)
MONOCYTES # BLD AUTO: 0.52 10*3/MM3 (ref 0.1–0.9)
MONOCYTES NFR BLD AUTO: 7.4 % (ref 5–12)
NEUTROPHILS NFR BLD AUTO: 4.04 10*3/MM3 (ref 1.7–7)
NEUTROPHILS NFR BLD AUTO: 57.6 % (ref 42.7–76)
NRBC BLD AUTO-RTO: 0 /100 WBC (ref 0–0.2)
PLATELET # BLD AUTO: 221 10*3/MM3 (ref 140–450)
PMV BLD AUTO: 9.7 FL (ref 6–12)
POTASSIUM SERPL-SCNC: 3.8 MMOL/L (ref 3.5–5.2)
QT INTERVAL: 409 MS
RBC # BLD AUTO: 4.88 10*6/MM3 (ref 4.14–5.8)
SARS-COV-2 ORF1AB RESP QL NAA+PROBE: NOT DETECTED
SODIUM SERPL-SCNC: 140 MMOL/L (ref 136–145)
WBC # BLD AUTO: 7.02 10*3/MM3 (ref 3.4–10.8)

## 2021-10-26 PROCEDURE — 93005 ELECTROCARDIOGRAM TRACING: CPT

## 2021-10-26 PROCEDURE — C9803 HOPD COVID-19 SPEC COLLECT: HCPCS | Performed by: NURSE PRACTITIONER

## 2021-10-26 PROCEDURE — 85025 COMPLETE CBC W/AUTO DIFF WBC: CPT

## 2021-10-26 PROCEDURE — 71046 X-RAY EXAM CHEST 2 VIEWS: CPT

## 2021-10-26 PROCEDURE — 36415 COLL VENOUS BLD VENIPUNCTURE: CPT

## 2021-10-26 PROCEDURE — 80048 BASIC METABOLIC PNL TOTAL CA: CPT

## 2021-10-26 PROCEDURE — 93010 ELECTROCARDIOGRAM REPORT: CPT | Performed by: INTERNAL MEDICINE

## 2021-10-26 PROCEDURE — U0004 COV-19 TEST NON-CDC HGH THRU: HCPCS | Performed by: NURSE PRACTITIONER

## 2021-10-26 NOTE — PROGRESS NOTES
Patient seen and examined.  No changes to H&P.  R/B/A discussed.  Plan to proceed with excision of right knee mass.  Patient marked and consent has been verified.    Abdulkadir Ham M.D.

## 2021-10-26 NOTE — DISCHARGE INSTRUCTIONS
Take the following medications the morning of surgery:  OMEPRAZOLE AND NADOLOL    If you are on prescription narcotic pain medication to control your pain you may also take that medication the morning of surgery.    General Instructions: CLEAR LIQUIDS UNTIL 5:30 AM MORNING OF SURGERY  • Do not eat solid food after midnight the night before surgery.  • You may drink clear liquids day of surgery but must stop at least one hour before your hospital arrival time.  • It is beneficial for you to have a clear drink that contains carbohydrates the day of surgery.  We suggest a 12 to 20 ounce bottle of Gatorade or Powerade for non-diabetic patients or a 12 to 20 ounce bottle of G2 or Powerade Zero for diabetic patients. (Pediatric patients, are not advised to drink a 12 to 20 ounce carbohydrate drink)    Clear liquids are liquids you can see through.  Nothing red in color.     Plain water                               Sports drinks  Sodas                                   Gelatin (Jell-O)  Fruit juices without pulp such as white grape juice and apple juice  Popsicles that contain no fruit or yogurt  Tea or coffee (no cream or milk added)  Gatorade / Powerade  G2 / Powerade Zero    • Infants may have breast milk up to four hours before surgery.  • Infants drinking formula may drink formula up to six hours before surgery.   • Patients who avoid smoking, chewing tobacco and alcohol for 4 weeks prior to surgery have a reduced risk of post-operative complications.  Quit smoking as many days before surgery as you can.  • Do not smoke, use chewing tobacco or drink alcohol the day of surgery.   • If applicable bring your C-PAP/ BI-PAP machine.  • Bring any papers given to you in the doctor’s office.  • Wear clean comfortable clothes.  • Do not wear contact lenses, false eyelashes or make-up.  Bring a case for your glasses.   • Bring crutches or walker if applicable.  • Remove all piercings.  Leave jewelry and any other valuables at  home.  • Hair extensions with metal clips must be removed prior to surgery.  • The Pre-Admission Testing nurse will instruct you to bring medications if unable to obtain an accurate list in Pre-Admission Testing.        If you were given a blood bank ID arm band remember to bring it with you the day of surgery.    Preventing a Surgical Site Infection:  • For 2 to 3 days before surgery, avoid shaving with a razor because the razor can irritate skin and make it easier to develop an infection.    • Any areas of open skin can increase the risk of a post-operative wound infection by allowing bacteria to enter and travel throughout the body.  Notify your surgeon if you have any skin wounds / rashes even if it is not near the expected surgical site.  The area will need assessed to determine if surgery should be delayed until it is healed.  • The night prior to surgery shower using a fresh bar of anti-bacterial soap (such as Dial) and clean washcloth.  Sleep in a clean bed with clean clothing.  Do not allow pets to sleep with you.  • Shower on the morning of surgery using a fresh bar of anti-bacterial soap (such as Dial) and clean washcloth.  Dry with a clean towel and dress in clean clothing.  • Ask your surgeon if you will be receiving antibiotics prior to surgery.  • Make sure you, your family, and all healthcare providers clean their hands with soap and water or an alcohol based hand  before caring for you or your wound.    Day of surgery: 10/27/2021 ARRIVAL TIME 6:30 AM  Your arrival time is approximately two hours before your scheduled surgery time.  Upon arrival, a Pre-op nurse and Anesthesiologist will review your health history, obtain vital signs, and answer questions you may have.  The only belongings needed at this time will be a list of your home medications and if applicable your C-PAP/BI-PAP machine.  A Pre-op nurse will start an IV and you may receive medication in preparation for surgery, including  something to help you relax.     Please be aware that surgery does come with discomfort.  We want to make every effort to control your discomfort so please discuss any uncontrolled symptoms with your nurse.   Your doctor will most likely have prescribed pain medications.      If you are going home after surgery you will receive individualized written care instructions before being discharged.  A responsible adult must drive you to and from the hospital on the day of your surgery and stay with you for 24 hours.  Discharge prescriptions can be filled by the hospital pharmacy during regular pharmacy hours.  If you are having surgery late in the day/evening your prescription may be e-prescribed to your pharmacy.  Please verify your pharmacy hours or chose a 24 hour pharmacy to avoid not having access to your prescription because your pharmacy has closed for the day.    If you are staying overnight following surgery, you will be transported to your hospital room following the recovery period.  [facility] has all private rooms.    If you have any questions please call Pre-Admission Testing at [phone].  Deductibles and co-payments are collected on the day of service. Please be prepared to pay the required co-pay, deductible or deposit on the day of service as defined by your plan.    Patient Education for Self-Quarantine Process    • Following your COVID testing, we strongly recommend that you wear a mask when you are with other people and practice social distancing.   • Limit your activities to only required outings.  • Wash your hands with soap and water frequently for at least 20 seconds.   • Avoid touching your eyes, nose and mouth with unwashed hands.  • Do not share anything - utensils, drinking glasses, food from the same bowl.   • Sanitize household surfaces daily. Include all high touch areas (door handles, light switches, phones, countertops, etc.)    Call your surgeon immediately if you  experience any of the following symptoms:  • Sore Throat  • Shortness of Breath or difficulty breathing  • Cough  • Chills  • Body soreness or muscle pain  • Headache  • Fever  • New loss of taste or smell  • Do not arrive for your surgery ill.  Your procedure will need to be rescheduled to another time.  You will need to call your physician before the day of surgery to avoid any unnecessary exposure to hospital staff as well as other patients.    CHLORHEXIDINE CLOTH INSTRUCTIONS  The morning of surgery follow these instructions using the Chlorhexidine cloths you've been given.  These steps reduce bacteria on the body.  Do not use the cloths near your eyes, ears mouth, genitalia or on open wounds.  Throw the cloths away after use but do not try to flush them down a toilet.      • Open and remove one cloth at a time from the package.    • Leave the cloth unfolded and begin the bathing.  • Massage the skin with the cloths using gentle pressure to remove bacteria.  Do not scrub harshly.   • Follow the steps below with one 2% CHG cloth per area (6 total cloths).  • One cloth for neck, shoulders and chest.  • One cloth for both arms, hands, fingers and underarms (do underarms last).  • One cloth for the abdomen followed by groin.  • One cloth for right leg and foot including between the toes.  • One cloth for left leg and foot including between the toes.  • The last cloth is to be used for the back of the neck, back and buttocks.    Allow the CHG to air dry 3 minutes on the skin which will give it time to work and decrease the chance of irritation.  The skin may feel sticky until it is dry.  Do not rinse with water or any other liquid or you will lose the beneficial effects of the CHG.  If mild skin irritation occurs, do rinse the skin to remove the CHG.  Report this to the nurse at time of admission.  Do not apply lotions, creams, ointments, deodorants or perfumes after using the clothes. Dress in clean clothes before  coming to the hospital.

## 2021-10-27 ENCOUNTER — HOSPITAL ENCOUNTER (OUTPATIENT)
Facility: HOSPITAL | Age: 41
Setting detail: HOSPITAL OUTPATIENT SURGERY
Discharge: HOME | End: 2021-10-27
Attending: ORTHOPAEDIC SURGERY | Admitting: ORTHOPAEDIC SURGERY

## 2021-10-27 ENCOUNTER — ANESTHESIA EVENT (OUTPATIENT)
Dept: PERIOP | Facility: HOSPITAL | Age: 41
End: 2021-10-27

## 2021-10-27 ENCOUNTER — TELEPHONE (OUTPATIENT)
Dept: ORTHOPEDIC SURGERY | Facility: CLINIC | Age: 41
End: 2021-10-27

## 2021-10-27 ENCOUNTER — ANESTHESIA (OUTPATIENT)
Dept: PERIOP | Facility: HOSPITAL | Age: 41
End: 2021-10-27

## 2021-10-27 VITALS
DIASTOLIC BLOOD PRESSURE: 81 MMHG | TEMPERATURE: 97.6 F | HEIGHT: 71 IN | HEART RATE: 52 BPM | OXYGEN SATURATION: 98 % | SYSTOLIC BLOOD PRESSURE: 119 MMHG | WEIGHT: 248.3 LBS | RESPIRATION RATE: 16 BRPM | BODY MASS INDEX: 34.76 KG/M2

## 2021-10-27 DIAGNOSIS — R22.41 KNEE MASS, RIGHT: Primary | ICD-10-CM

## 2021-10-27 PROCEDURE — 25010000002 FENTANYL CITRATE (PF) 50 MCG/ML SOLUTION: Performed by: NURSE ANESTHETIST, CERTIFIED REGISTERED

## 2021-10-27 PROCEDURE — 88341 IMHCHEM/IMCYTCHM EA ADD ANTB: CPT | Performed by: ORTHOPAEDIC SURGERY

## 2021-10-27 PROCEDURE — 88307 TISSUE EXAM BY PATHOLOGIST: CPT | Performed by: ORTHOPAEDIC SURGERY

## 2021-10-27 PROCEDURE — 25010000002 PROPOFOL 10 MG/ML EMULSION: Performed by: NURSE ANESTHETIST, CERTIFIED REGISTERED

## 2021-10-27 PROCEDURE — 25010000002 FENTANYL CITRATE (PF) 50 MCG/ML SOLUTION: Performed by: ANESTHESIOLOGY

## 2021-10-27 PROCEDURE — 88342 IMHCHEM/IMCYTCHM 1ST ANTB: CPT | Performed by: ORTHOPAEDIC SURGERY

## 2021-10-27 PROCEDURE — 25010000002 MIDAZOLAM PER 1 MG: Performed by: ANESTHESIOLOGY

## 2021-10-27 PROCEDURE — 88360 TUMOR IMMUNOHISTOCHEM/MANUAL: CPT | Performed by: ORTHOPAEDIC SURGERY

## 2021-10-27 PROCEDURE — 0 CEFAZOLIN IN DEXTROSE 2-4 GM/100ML-% SOLUTION: Performed by: ORTHOPAEDIC SURGERY

## 2021-10-27 RX ORDER — LABETALOL HYDROCHLORIDE 5 MG/ML
5 INJECTION, SOLUTION INTRAVENOUS
Status: DISCONTINUED | OUTPATIENT
Start: 2021-10-27 | End: 2021-10-27 | Stop reason: HOSPADM

## 2021-10-27 RX ORDER — LIDOCAINE HYDROCHLORIDE 10 MG/ML
0.5 INJECTION, SOLUTION EPIDURAL; INFILTRATION; INTRACAUDAL; PERINEURAL ONCE AS NEEDED
Status: DISCONTINUED | OUTPATIENT
Start: 2021-10-27 | End: 2021-10-27 | Stop reason: HOSPADM

## 2021-10-27 RX ORDER — HYDRALAZINE HYDROCHLORIDE 20 MG/ML
5 INJECTION INTRAMUSCULAR; INTRAVENOUS
Status: DISCONTINUED | OUTPATIENT
Start: 2021-10-27 | End: 2021-10-27 | Stop reason: HOSPADM

## 2021-10-27 RX ORDER — PROPOFOL 10 MG/ML
VIAL (ML) INTRAVENOUS CONTINUOUS PRN
Status: DISCONTINUED | OUTPATIENT
Start: 2021-10-27 | End: 2021-10-27 | Stop reason: SURG

## 2021-10-27 RX ORDER — SODIUM CHLORIDE, SODIUM LACTATE, POTASSIUM CHLORIDE, CALCIUM CHLORIDE 600; 310; 30; 20 MG/100ML; MG/100ML; MG/100ML; MG/100ML
9 INJECTION, SOLUTION INTRAVENOUS CONTINUOUS
Status: DISCONTINUED | OUTPATIENT
Start: 2021-10-27 | End: 2021-10-27 | Stop reason: HOSPADM

## 2021-10-27 RX ORDER — OXYCODONE AND ACETAMINOPHEN 10; 325 MG/1; MG/1
1 TABLET ORAL EVERY 4 HOURS PRN
Status: DISCONTINUED | OUTPATIENT
Start: 2021-10-27 | End: 2021-10-27 | Stop reason: HOSPADM

## 2021-10-27 RX ORDER — SODIUM CHLORIDE 0.9 % (FLUSH) 0.9 %
3 SYRINGE (ML) INJECTION EVERY 12 HOURS SCHEDULED
Status: DISCONTINUED | OUTPATIENT
Start: 2021-10-27 | End: 2021-10-27 | Stop reason: HOSPADM

## 2021-10-27 RX ORDER — SODIUM CHLORIDE 0.9 % (FLUSH) 0.9 %
3-10 SYRINGE (ML) INJECTION AS NEEDED
Status: DISCONTINUED | OUTPATIENT
Start: 2021-10-27 | End: 2021-10-27 | Stop reason: HOSPADM

## 2021-10-27 RX ORDER — NALOXONE HCL 0.4 MG/ML
0.2 VIAL (ML) INJECTION AS NEEDED
Status: DISCONTINUED | OUTPATIENT
Start: 2021-10-27 | End: 2021-10-27 | Stop reason: HOSPADM

## 2021-10-27 RX ORDER — DIPHENHYDRAMINE HYDROCHLORIDE 50 MG/ML
12.5 INJECTION INTRAMUSCULAR; INTRAVENOUS
Status: DISCONTINUED | OUTPATIENT
Start: 2021-10-27 | End: 2021-10-27 | Stop reason: HOSPADM

## 2021-10-27 RX ORDER — PROMETHAZINE HYDROCHLORIDE 12.5 MG/1
25 TABLET ORAL ONCE AS NEEDED
Status: DISCONTINUED | OUTPATIENT
Start: 2021-10-27 | End: 2021-10-27 | Stop reason: HOSPADM

## 2021-10-27 RX ORDER — PROMETHAZINE HYDROCHLORIDE 25 MG/1
25 SUPPOSITORY RECTAL ONCE AS NEEDED
Status: DISCONTINUED | OUTPATIENT
Start: 2021-10-27 | End: 2021-10-27 | Stop reason: HOSPADM

## 2021-10-27 RX ORDER — FAMOTIDINE 10 MG/ML
20 INJECTION, SOLUTION INTRAVENOUS ONCE
Status: COMPLETED | OUTPATIENT
Start: 2021-10-27 | End: 2021-10-27

## 2021-10-27 RX ORDER — ONDANSETRON 2 MG/ML
4 INJECTION INTRAMUSCULAR; INTRAVENOUS ONCE AS NEEDED
Status: DISCONTINUED | OUTPATIENT
Start: 2021-10-27 | End: 2021-10-27 | Stop reason: HOSPADM

## 2021-10-27 RX ORDER — EPHEDRINE SULFATE 50 MG/ML
5 INJECTION, SOLUTION INTRAVENOUS ONCE AS NEEDED
Status: DISCONTINUED | OUTPATIENT
Start: 2021-10-27 | End: 2021-10-27 | Stop reason: HOSPADM

## 2021-10-27 RX ORDER — FENTANYL CITRATE 50 UG/ML
INJECTION, SOLUTION INTRAMUSCULAR; INTRAVENOUS AS NEEDED
Status: DISCONTINUED | OUTPATIENT
Start: 2021-10-27 | End: 2021-10-27 | Stop reason: SURG

## 2021-10-27 RX ORDER — HYDROCODONE BITARTRATE AND ACETAMINOPHEN 7.5; 325 MG/1; MG/1
1 TABLET ORAL ONCE AS NEEDED
Status: DISCONTINUED | OUTPATIENT
Start: 2021-10-27 | End: 2021-10-27 | Stop reason: HOSPADM

## 2021-10-27 RX ORDER — FLUMAZENIL 0.1 MG/ML
0.2 INJECTION INTRAVENOUS AS NEEDED
Status: DISCONTINUED | OUTPATIENT
Start: 2021-10-27 | End: 2021-10-27 | Stop reason: HOSPADM

## 2021-10-27 RX ORDER — FENTANYL CITRATE 50 UG/ML
50 INJECTION, SOLUTION INTRAMUSCULAR; INTRAVENOUS
Status: DISCONTINUED | OUTPATIENT
Start: 2021-10-27 | End: 2021-10-27 | Stop reason: HOSPADM

## 2021-10-27 RX ORDER — CEFAZOLIN SODIUM 2 G/100ML
2 INJECTION, SOLUTION INTRAVENOUS ONCE
Status: COMPLETED | OUTPATIENT
Start: 2021-10-27 | End: 2021-10-27

## 2021-10-27 RX ORDER — MIDAZOLAM HYDROCHLORIDE 1 MG/ML
1 INJECTION INTRAMUSCULAR; INTRAVENOUS
Status: DISCONTINUED | OUTPATIENT
Start: 2021-10-27 | End: 2021-10-27 | Stop reason: HOSPADM

## 2021-10-27 RX ORDER — DIPHENHYDRAMINE HCL 25 MG
25 CAPSULE ORAL
Status: DISCONTINUED | OUTPATIENT
Start: 2021-10-27 | End: 2021-10-27 | Stop reason: HOSPADM

## 2021-10-27 RX ORDER — HYDROMORPHONE HYDROCHLORIDE 1 MG/ML
0.5 INJECTION, SOLUTION INTRAMUSCULAR; INTRAVENOUS; SUBCUTANEOUS
Status: DISCONTINUED | OUTPATIENT
Start: 2021-10-27 | End: 2021-10-27 | Stop reason: HOSPADM

## 2021-10-27 RX ORDER — MAGNESIUM HYDROXIDE 1200 MG/15ML
LIQUID ORAL AS NEEDED
Status: DISCONTINUED | OUTPATIENT
Start: 2021-10-27 | End: 2021-10-27 | Stop reason: HOSPADM

## 2021-10-27 RX ORDER — IBUPROFEN 600 MG/1
600 TABLET ORAL ONCE AS NEEDED
Status: DISCONTINUED | OUTPATIENT
Start: 2021-10-27 | End: 2021-10-27 | Stop reason: HOSPADM

## 2021-10-27 RX ORDER — IBUPROFEN 800 MG/1
800 TABLET ORAL EVERY 6 HOURS PRN
Qty: 60 TABLET | Refills: 0 | Status: SHIPPED | OUTPATIENT
Start: 2021-10-27 | End: 2022-04-22

## 2021-10-27 RX ADMIN — SODIUM CHLORIDE, POTASSIUM CHLORIDE, SODIUM LACTATE AND CALCIUM CHLORIDE 9 ML/HR: 600; 310; 30; 20 INJECTION, SOLUTION INTRAVENOUS at 07:27

## 2021-10-27 RX ADMIN — FAMOTIDINE 20 MG: 10 INJECTION INTRAVENOUS at 08:06

## 2021-10-27 RX ADMIN — FENTANYL CITRATE 50 MCG: 0.05 INJECTION, SOLUTION INTRAMUSCULAR; INTRAVENOUS at 08:49

## 2021-10-27 RX ADMIN — PROPOFOL 75 MCG/KG/MIN: 10 INJECTION, EMULSION INTRAVENOUS at 08:51

## 2021-10-27 RX ADMIN — CEFAZOLIN SODIUM 2 G: 2 INJECTION, SOLUTION INTRAVENOUS at 08:44

## 2021-10-27 RX ADMIN — FENTANYL CITRATE 50 MCG: 50 INJECTION INTRAMUSCULAR; INTRAVENOUS at 08:18

## 2021-10-27 RX ADMIN — MIDAZOLAM 1 MG: 1 INJECTION INTRAMUSCULAR; INTRAVENOUS at 08:18

## 2021-10-27 RX ADMIN — FENTANYL CITRATE 50 MCG: 0.05 INJECTION, SOLUTION INTRAMUSCULAR; INTRAVENOUS at 09:07

## 2021-10-27 NOTE — TELEPHONE ENCOUNTER
Provider: DR DOTSON/NICANOR NORIEGA  Caller: NINA CASON  Relationship to Patient: SELF  Phone Number: [phone]    Reason for Call: WASN'TT ABLE TO WARM TRANSFER:   PATIENT REQ CALL BACK.PATIENT IS SCHEDULED TO COME IN TOMORROW AND PER PATIENT NOT SURE IF DR DOTSON STILL WANTS HIM TO OR NOT?  PER PATIENT HE WAS REFERRED TO ANOTHER SURGEON FOR THE PROBLEM AND HAD SX TODAY.  SO, HE WASN'T;T SURE IF HE STILL NEEDED TO COME IN OR NOT - PER PATIENT HE WAS OK WITH STILL SEEING HER, BUT DIDN'T;T KNOW IF NEEDED TO OR NOT, SINCE SHE REFERRED HIM ELSEWHERE.

## 2021-10-27 NOTE — ANESTHESIA POSTPROCEDURE EVALUATION
Patient: Arsenio Beard    Procedure Summary     Date: 10/27/21 Room / Location: [facility] OR  / [facility] MAIN OR    Anesthesia Start: 0846 Anesthesia Stop: 0951    Procedure: MASS SOFT TISSUE EXCISION rIGHT KNEE MASS (Right ) Diagnosis:       Knee mass, right      (Knee mass, right [R22.41])    Surgeons: Abdulkadir Ham MD Provider: Og Wheatley MD    Anesthesia Type: MAC ASA Status: 2          Anesthesia Type: MAC    Vitals  Vitals Value Taken Time   /81 10/27/21 1021   Temp 36.4 °C (97.6 °F) 10/27/21 0949   Pulse 48 10/27/21 1026   Resp 16 10/27/21 1020   SpO2 97 % 10/27/21 1026   Vitals shown include unvalidated device data.        Post Anesthesia Care and Evaluation    Patient location during evaluation: PACU  Patient participation: complete - patient participated  Level of consciousness: awake and alert  Pain management: adequate  Airway patency: patent  Anesthetic complications: No anesthetic complications    Cardiovascular status: acceptable  Respiratory status: acceptable  Hydration status: acceptable    Comments: --------------------            10/27/21               1020     --------------------   BP:       119/81     Pulse:      52       Resp:       16       Temp:                SpO2:      98%      --------------------

## 2021-10-27 NOTE — ANESTHESIA PREPROCEDURE EVALUATION
Anesthesia Evaluation     Patient summary reviewed and Nursing notes reviewed   NPO Solid Status: > 8 hours  NPO Liquid Status: > 2 hours           Airway   Mallampati: II  TM distance: >3 FB  Neck ROM: full  Dental - normal exam     Pulmonary - negative pulmonary ROS and normal exam   Cardiovascular - normal exam    ECG reviewed    (+) hypertension,       Neuro/Psych  (+) psychiatric history Depression,     GI/Hepatic/Renal/Endo    (+) obesity,  GERD,  renal disease,     Musculoskeletal     (+) back pain,   Abdominal    Substance History - negative use     OB/GYN negative ob/gyn ROS         Other                        Anesthesia Plan    ASA 2     MAC   (No obstetric history on file.  Unknown      I have reviewed the patient's history with the patient and the chart, including all pertinent laboratory results and imaging. I have explained the risks of anesthesia including but not limited to dental damage, corneal abrasion, nerve injury, MI, stroke, and death.)    Anesthetic plan, all risks, benefits, and alternatives have been provided, discussed and informed consent has been obtained with: patient.

## 2021-10-28 NOTE — H&P
Patient was seen and examined. No changes to his H&P.  R/B/A discussed.  Consent verified and patient marked.    Abdulkadir Ham M.D.

## 2021-10-29 ENCOUNTER — OFFICE VISIT (OUTPATIENT)
Dept: INTERNAL MEDICINE | Facility: CLINIC | Age: 41
End: 2021-10-29

## 2021-10-29 VITALS
SYSTOLIC BLOOD PRESSURE: 140 MMHG | HEART RATE: 88 BPM | WEIGHT: 246 LBS | RESPIRATION RATE: 18 BRPM | BODY MASS INDEX: 34.44 KG/M2 | DIASTOLIC BLOOD PRESSURE: 90 MMHG | OXYGEN SATURATION: 98 % | TEMPERATURE: 97.4 F | HEIGHT: 71 IN

## 2021-10-29 DIAGNOSIS — N28.89 RIGHT KIDNEY MASS: ICD-10-CM

## 2021-10-29 DIAGNOSIS — R93.89 ABNORMAL MRI: Primary | ICD-10-CM

## 2021-10-29 PROBLEM — M51.369 ANNULAR TEAR OF LUMBAR DISC: Status: ACTIVE | Noted: 2021-09-27

## 2021-10-29 PROBLEM — M51.36 ANNULAR TEAR OF LUMBAR DISC: Status: ACTIVE | Noted: 2021-09-27

## 2021-10-29 LAB
LAB AP CASE REPORT: NORMAL
LAB AP DIAGNOSIS COMMENT: NORMAL
PATH REPORT.FINAL DX SPEC: NORMAL
PATH REPORT.GROSS SPEC: NORMAL

## 2021-10-29 PROCEDURE — 99213 OFFICE O/P EST LOW 20 MIN: CPT | Performed by: NURSE PRACTITIONER

## 2021-10-29 NOTE — PROGRESS NOTES
Subjective   Arsenio Beard is a 40 y.o. male. Patient is here today for   Chief Complaint   Patient presents with   • Cyst on Kidney     incidental finding on MRI         Answers for HPI/ROS submitted by the patient on 10/29/2021  What is the primary reason for your visit?: Other  Please describe your symptoms.: Cyst on kidney  Have you had these symptoms before?: No  How long have you been having these symptoms?: Greater than 2 weeks  Please list any medications you are currently taking for this condition.: N/A  Please describe any probable cause for these symptoms. : Unknown      Vitals:    10/29/21 1506   BP: 140/90   Pulse: 88   Resp: 18   Temp: 97.4 °F (36.3 °C)   SpO2: 98%     Body mass index is 34.31 kg/m².  The following portions of the patient's history were reviewed and updated as appropriate: allergies, current medications, past family history, past medical history, past social history, past surgical history and problem list.    Past Medical History:   Diagnosis Date   • At risk for sleep apnea     SLEEP STUDY WAS NEGATIVE   • Depression 06/1982   • GERD (gastroesophageal reflux disease)    • History of COVID-19     11/2020   • History of staph infection 2008    ON BACK   • Hypertension    • Kidney cysts    • Low back pain    • Mass of knee     RIGHT   • Obesity 06/2003   • Right groin pain    • Seasonal allergies    • Visual impairment 01/1986      No Known Allergies   Social History     Socioeconomic History   • Marital status: Single   Tobacco Use   • Smoking status: Never Smoker   • Smokeless tobacco: Never Used   Vaping Use   • Vaping Use: Never used   Substance and Sexual Activity   • Alcohol use: Never   • Drug use: Not Currently     Comment: STEROID USE X3 YEARS/[id number]   • Sexual activity: Not Currently     Partners: Female     Birth control/protection: None     Comment: Am a [employer]. Abstained from sexual intimacy since 2006        Current Outpatient Medications:   •  fluticasone  (FLONASE) 50 MCG/ACT nasal spray, 2 sprays into the nostril(s) as directed by provider Daily., Disp: , Rfl:   •  ibuprofen (ADVIL,MOTRIN) 800 MG tablet, Take 1 tablet by mouth Every 6 (Six) Hours As Needed for Mild Pain . HOLD PRIOR TO SURG, Disp: 60 tablet, Rfl: 0  •  nadolol (CORGARD) 20 MG tablet, Take 1 tablet by mouth Daily., Disp: 90 tablet, Rfl: 1  •  omeprazole (priLOSEC) 40 MG capsule, Take 1 capsule by mouth Daily., Disp: 90 capsule, Rfl: 1     Objective     History of Present Illness  Arsenio is here to discuss an incidental finding on the MRI of the thoracic spine that was ordered by neurosurgery at [facility]. I reviewed MRI report  in care everywhere which stated that there is a rounded focus of hyperintense t2 signal noted within the mid right kidney which may represent a renal cyst but is not fully evaluated.   He has had some right flank pain that has been intermittent for years. It radiates to the groin. He had an US in prison in 2013. I do not have those records to review. He denies dysuria, urgency, hematuria or frequency. Recent kidney function and UA are normal            Review of Systems   Respiratory: Negative.    Cardiovascular: Negative.    Genitourinary: Positive for flank pain. Negative for difficulty urinating, dysuria, frequency and hematuria.   Musculoskeletal: Positive for back pain.       Physical Exam  Vitals and nursing note reviewed.   Constitutional:       General: He is not in acute distress.     Appearance: Normal appearance. He is well-developed and well-groomed.   Cardiovascular:      Rate and Rhythm: Normal rate and regular rhythm.   Pulmonary:      Effort: Pulmonary effort is normal.      Breath sounds: Normal breath sounds.   Abdominal:      Tenderness: There is no abdominal tenderness. There is no right CVA tenderness.   Neurological:      Mental Status: He is alert.         ASSESSMENT     Problems Addressed this Visit     None      Visit Diagnoses     Abnormal MRI    -   Primary    Relevant Orders    CT Abdomen Pelvis With Contrast    Right kidney mass        Relevant Orders    CT Abdomen Pelvis With Contrast      Diagnoses       Codes Comments    Abnormal MRI    -  Primary ICD-10-CM: R93.89  ICD-9-CM: 793.99     Right kidney mass     ICD-10-CM: N28.89  ICD-9-CM: 593.9           PLAN   incidental finding in right kidney on MRI, will do a CT for further eval- pt also has had chronic flank pain, may refer to urology if needed  Follow up as scheduled or sooner if needed

## 2021-11-24 ENCOUNTER — TELEPHONE (OUTPATIENT)
Dept: INTERNAL MEDICINE | Facility: CLINIC | Age: 41
End: 2021-11-24

## 2021-11-24 ENCOUNTER — HOSPITAL ENCOUNTER (OUTPATIENT)
Dept: CT IMAGING | Facility: HOSPITAL | Age: 41
Discharge: HOME | End: 2021-11-24
Admitting: NURSE PRACTITIONER

## 2021-11-24 DIAGNOSIS — R93.89 ABNORMAL MRI: ICD-10-CM

## 2021-11-24 DIAGNOSIS — N28.89 RIGHT KIDNEY MASS: ICD-10-CM

## 2021-11-24 PROCEDURE — 25010000002 IOPAMIDOL 61 % SOLUTION: Performed by: NURSE PRACTITIONER

## 2021-11-24 PROCEDURE — 74177 CT ABD & PELVIS W/CONTRAST: CPT

## 2021-11-24 RX ADMIN — IOPAMIDOL 85 ML: 612 INJECTION, SOLUTION INTRAVENOUS at 09:19

## 2021-11-24 NOTE — TELEPHONE ENCOUNTER
----- Message from ANURADHA Negron sent at 11/24/2021  3:38 PM EST -----  Please call and let him know that there is a benign right renal cyst, no other abnormality.

## 2021-12-07 ENCOUNTER — OFFICE VISIT (OUTPATIENT)
Dept: GASTROENTEROLOGY | Facility: CLINIC | Age: 41
End: 2021-12-07

## 2021-12-07 VITALS — TEMPERATURE: 97.8 F | WEIGHT: 255 LBS | HEIGHT: 72 IN | BODY MASS INDEX: 34.54 KG/M2

## 2021-12-07 DIAGNOSIS — K21.9 GASTROESOPHAGEAL REFLUX DISEASE, UNSPECIFIED WHETHER ESOPHAGITIS PRESENT: Primary | ICD-10-CM

## 2021-12-07 DIAGNOSIS — J34.89 SINUS PRESSURE: ICD-10-CM

## 2021-12-07 DIAGNOSIS — R13.12 OROPHARYNGEAL DYSPHAGIA: ICD-10-CM

## 2021-12-07 PROCEDURE — 99204 OFFICE O/P NEW MOD 45 MIN: CPT | Performed by: INTERNAL MEDICINE

## 2021-12-07 RX ORDER — PANTOPRAZOLE SODIUM 40 MG/1
40 TABLET, DELAYED RELEASE ORAL DAILY
Qty: 90 TABLET | Refills: 3 | Status: SHIPPED | OUTPATIENT
Start: 2021-12-07 | End: 2022-12-07

## 2021-12-07 NOTE — PROGRESS NOTES
Chief Complaint   Patient presents with   • Difficulty Swallowing   • Heartburn     Arsenio Beard is a 41 y.o. male who presents with a history of heartburn with oropharyngeal dysphagia  HPI     Patient 41-year-old male with history of hypertension, kidney cysts and reflux complaining of frequent heartburn and oropharyngeal dysphagia going on for a number of years.  Patient reports needs to drink something in order to swallow.  Patient reports also has a chronic sensation that food is hung up in the back of his throat as well as chronic sinus pressure across his face and forehead.  Patient has never been evaluated for the sinus pressure but has been using omeprazole with only partial improvement.  Continued dysphagia noted but heartburn seems to be less frequent but recurrent.    Past Medical History:   Diagnosis Date   • At risk for sleep apnea     SLEEP STUDY WAS NEGATIVE   • Depression 06/1982   • GERD (gastroesophageal reflux disease)    • Hernia 02/02/2021    Repaired on 02/23/2021   • History of COVID-19     11/2020   • History of staph infection 2008    ON BACK   • Hypertension    • Kidney cysts    • Low back pain    • Mass of knee     RIGHT   • Obesity 06/2003   • Right groin pain    • Seasonal allergies    • Visual impairment 01/1986       Current Outpatient Medications:   •  ibuprofen (ADVIL,MOTRIN) 800 MG tablet, Take 1 tablet by mouth Every 6 (Six) Hours As Needed for Mild Pain . HOLD PRIOR TO SURG, Disp: 60 tablet, Rfl: 0  •  Multiple Vitamins-Minerals (EMERGEN-C IMMUNE PO), Take  by mouth Daily., Disp: , Rfl:   •  nadolol (CORGARD) 20 MG tablet, Take 1 tablet by mouth Daily., Disp: 90 tablet, Rfl: 1  •  fluticasone (FLONASE) 50 MCG/ACT nasal spray, 2 sprays into the nostril(s) as directed by provider Daily., Disp: , Rfl:   •  pantoprazole (PROTONIX) 40 MG EC tablet, Take 1 tablet by mouth Daily., Disp: 90 tablet, Rfl: 3  No Known Allergies  Social History     Socioeconomic History   • Marital  status: Single   Tobacco Use   • Smoking status: Never Smoker   • Smokeless tobacco: Never Used   Vaping Use   • Vaping Use: Never used   Substance and Sexual Activity   • Alcohol use: Never   • Drug use: Never     Comment: STEROID USE X3 YEARS/4346-4372   • Sexual activity: Not Currently     Partners: Female     Birth control/protection: None     Comment: Am a [employer]ism. Abstained from sexual intimacy since 2006     Family History   Adopted: Yes   Problem Relation Age of Onset   • Heart disease Maternal Grandmother    • Heart attack Maternal Grandmother    • Malig Hyperthermia Neg Hx      Review of Systems   Constitutional: Negative.    HENT: Positive for congestion, postnasal drip, sinus pressure and trouble swallowing. Negative for facial swelling, hearing loss, mouth sores, nosebleeds, sinus pain, sore throat, tinnitus and voice change.    Eyes: Negative.    Respiratory: Negative.    Cardiovascular: Negative.    Gastrointestinal: Negative.    Endocrine: Negative.    Musculoskeletal: Negative.    Skin: Negative.    Allergic/Immunologic: Negative.    Hematological: Negative.      Vitals:    12/07/21 1509   Temp: 97.8 °F (36.6 °C)     Physical Exam  Vitals and nursing note reviewed.   Constitutional:       Appearance: Normal appearance. He is well-developed and normal weight.   HENT:      Head: Normocephalic and atraumatic.   Eyes:      General: No scleral icterus.     Conjunctiva/sclera: Conjunctivae normal.   Neck:      Trachea: No tracheal deviation.   Cardiovascular:      Rate and Rhythm: Normal rate and regular rhythm.      Heart sounds: Normal heart sounds.   Pulmonary:      Effort: Pulmonary effort is normal. No respiratory distress.      Breath sounds: Normal breath sounds.   Abdominal:      General: Bowel sounds are normal. There is no distension.      Palpations: Abdomen is soft. There is no mass.      Tenderness: There is no abdominal tenderness. There is no guarding or rebound.   Musculoskeletal:          General: No swelling or tenderness. Normal range of motion.      Cervical back: Normal range of motion and neck supple. No rigidity.   Skin:     General: Skin is warm and dry.      Coloration: Skin is not jaundiced.      Findings: No rash.   Neurological:      General: No focal deficit present.      Mental Status: He is alert and oriented to person, place, and time.   Psychiatric:         Thought Content: Thought content normal.         Judgment: Judgment normal.       Diagnoses and all orders for this visit:    Gastroesophageal reflux disease, unspecified whether esophagitis present  -     FL Esophagram Complete; Future    Oropharyngeal dysphagia  -     FL Esophagram Complete; Future    Sinus pressure  -     Ambulatory Referral to ENT (Otolaryngology)  -     FL Esophagram Complete; Future    Other orders  -     Multiple Vitamins-Minerals (EMERGEN-C IMMUNE PO); Take  by mouth Daily.  -     pantoprazole (PROTONIX) 40 MG EC tablet; Take 1 tablet by mouth Daily.    Patient 41-year-old male with history of hypertension, seasonal allergies and kidney cyst who presents with chronic reflux and oropharyngeal dysphagia for years.  Patient reports chronic sinus pressure as well with the sensation of something stuck in the back of his throat chronically.  Patient reports is able to swallow but has to wash all of his food down with significant amount of drink.  Patient denies any nausea vomiting does get frequent heartburn despite taking omeprazole.  At this point will change PPI to see if we can get better response and arrange an esophagram to rule out stricture or stenosis.  If stenotic segment consider for EGD with dilation as well as if abnormal mucosa identified.  Would also recommend ENT evaluation in light of his chronic sinus pressure across his face as is never been evaluated.  We will follow-up clinically based on findings.

## 2021-12-13 NOTE — TELEPHONE ENCOUNTER
Called [facility].  Pt has not had the lab to date.  I have placed another message via [employer] to have this completed.       [FreeTextEntry1] : EKG per cardiology

## 2022-01-06 ENCOUNTER — TELEPHONE (OUTPATIENT)
Dept: GASTROENTEROLOGY | Facility: CLINIC | Age: 42
End: 2022-01-06

## 2022-01-06 ENCOUNTER — HOSPITAL ENCOUNTER (OUTPATIENT)
Dept: GENERAL RADIOLOGY | Facility: HOSPITAL | Age: 42
Discharge: HOME | End: 2022-01-06
Admitting: INTERNAL MEDICINE

## 2022-01-06 DIAGNOSIS — R13.12 OROPHARYNGEAL DYSPHAGIA: ICD-10-CM

## 2022-01-06 DIAGNOSIS — J34.89 SINUS PRESSURE: ICD-10-CM

## 2022-01-06 DIAGNOSIS — K21.9 GASTROESOPHAGEAL REFLUX DISEASE, UNSPECIFIED WHETHER ESOPHAGITIS PRESENT: ICD-10-CM

## 2022-01-06 PROCEDURE — 74221 X-RAY XM ESOPHAGUS 2CNTRST: CPT

## 2022-01-06 RX ADMIN — BARIUM SULFATE 183 ML: 960 POWDER, FOR SUSPENSION ORAL at 10:19

## 2022-01-06 RX ADMIN — BARIUM SULFATE 700 MG: 700 TABLET ORAL at 10:19

## 2022-01-06 NOTE — TELEPHONE ENCOUNTER
call Dr. Lewis office on 1/6 to inquire about ref office did not rec, faxed over ref again and office will call pt to schedule

## 2022-01-11 ENCOUNTER — TELEPHONE (OUTPATIENT)
Dept: GASTROENTEROLOGY | Facility: CLINIC | Age: 42
End: 2022-01-11

## 2022-01-11 DIAGNOSIS — K21.9 GASTROESOPHAGEAL REFLUX DISEASE, UNSPECIFIED WHETHER ESOPHAGITIS PRESENT: Primary | ICD-10-CM

## 2022-01-11 NOTE — TELEPHONE ENCOUNTER
Per Dr. Eric: Reflux was identified distally so would proceed with EGD though would also recommend proceeding with ENT evaluation as the reflux seems to be mild to explain the upper symptoms.   Patient notified via my chart.   Order placed for EGD.

## 2022-01-14 ENCOUNTER — TELEPHONE (OUTPATIENT)
Dept: GASTROENTEROLOGY | Facility: CLINIC | Age: 42
End: 2022-01-14

## 2022-01-14 PROBLEM — K21.9 GASTROESOPHAGEAL REFLUX DISEASE: Status: ACTIVE | Noted: 2022-01-14

## 2022-01-14 NOTE — TELEPHONE ENCOUNTER
kun Liriano for 03/16 arrive at 1015/egd- mailing out prep inst on 1/14, advised pt time is subject to change [facility] will call.

## 2022-02-11 ENCOUNTER — TELEPHONE (OUTPATIENT)
Dept: INTERNAL MEDICINE | Facility: CLINIC | Age: 42
End: 2022-02-11

## 2022-02-11 DIAGNOSIS — R13.10 DYSPHAGIA, UNSPECIFIED TYPE: Primary | ICD-10-CM

## 2022-02-11 DIAGNOSIS — J34.89 SINUS PRESSURE: ICD-10-CM

## 2022-02-11 NOTE — TELEPHONE ENCOUNTER
----- Message from Thomas Campoverde MA sent at 2/11/2022 12:42 PM EST -----  Regarding: FW: Arsenio Durand    ----- Message -----  From: Arsenio Beard  Sent: 2/11/2022  12:04 PM EST  To: [facility]  Subject: Arsenio Durand                                   Sinus issues that cause pressure in my head. Also when I wear headphones it messes with the pressure in my head as sinuses. Finally When I eat I feel like my throat is it has a narrow passage. It’s hard to swallow. Thank you!!!

## 2022-03-08 ENCOUNTER — TRANSCRIBE ORDERS (OUTPATIENT)
Dept: ADMINISTRATIVE | Facility: HOSPITAL | Age: 42
End: 2022-03-08

## 2022-03-08 DIAGNOSIS — Z01.818 OTHER SPECIFIED PRE-OPERATIVE EXAMINATION: Primary | ICD-10-CM

## 2022-03-10 ENCOUNTER — TELEPHONE (OUTPATIENT)
Dept: GASTROENTEROLOGY | Facility: CLINIC | Age: 42
End: 2022-03-10

## 2022-03-10 DIAGNOSIS — Z01.818 PRE-OP TESTING: Primary | ICD-10-CM

## 2022-03-15 ENCOUNTER — LAB (OUTPATIENT)
Dept: LAB | Facility: HOSPITAL | Age: 42
End: 2022-03-15

## 2022-03-15 DIAGNOSIS — Z01.818 OTHER SPECIFIED PRE-OPERATIVE EXAMINATION: ICD-10-CM

## 2022-03-15 LAB — SARS-COV-2 ORF1AB RESP QL NAA+PROBE: NOT DETECTED

## 2022-03-15 PROCEDURE — U0004 COV-19 TEST NON-CDC HGH THRU: HCPCS

## 2022-03-15 PROCEDURE — C9803 HOPD COVID-19 SPEC COLLECT: HCPCS

## 2022-03-16 ENCOUNTER — ANESTHESIA EVENT (OUTPATIENT)
Dept: GASTROENTEROLOGY | Facility: HOSPITAL | Age: 42
End: 2022-03-16

## 2022-03-16 ENCOUNTER — HOSPITAL ENCOUNTER (OUTPATIENT)
Facility: HOSPITAL | Age: 42
Setting detail: HOSPITAL OUTPATIENT SURGERY
Discharge: HOME | End: 2022-03-16
Attending: INTERNAL MEDICINE | Admitting: INTERNAL MEDICINE

## 2022-03-16 ENCOUNTER — ANESTHESIA (OUTPATIENT)
Dept: GASTROENTEROLOGY | Facility: HOSPITAL | Age: 42
End: 2022-03-16

## 2022-03-16 VITALS
RESPIRATION RATE: 16 BRPM | HEART RATE: 65 BPM | OXYGEN SATURATION: 96 % | DIASTOLIC BLOOD PRESSURE: 64 MMHG | TEMPERATURE: 97.9 F | SYSTOLIC BLOOD PRESSURE: 122 MMHG | BODY MASS INDEX: 33.59 KG/M2 | HEIGHT: 72 IN | WEIGHT: 248 LBS

## 2022-03-16 DIAGNOSIS — K21.9 GASTROESOPHAGEAL REFLUX DISEASE, UNSPECIFIED WHETHER ESOPHAGITIS PRESENT: ICD-10-CM

## 2022-03-16 PROCEDURE — 25010000002 PROPOFOL 10 MG/ML EMULSION: Performed by: ANESTHESIOLOGY

## 2022-03-16 PROCEDURE — 88305 TISSUE EXAM BY PATHOLOGIST: CPT | Performed by: INTERNAL MEDICINE

## 2022-03-16 PROCEDURE — 43239 EGD BIOPSY SINGLE/MULTIPLE: CPT | Performed by: INTERNAL MEDICINE

## 2022-03-16 PROCEDURE — S0260 H&P FOR SURGERY: HCPCS | Performed by: INTERNAL MEDICINE

## 2022-03-16 RX ORDER — PROPOFOL 10 MG/ML
VIAL (ML) INTRAVENOUS AS NEEDED
Status: DISCONTINUED | OUTPATIENT
Start: 2022-03-16 | End: 2022-03-16 | Stop reason: SURG

## 2022-03-16 RX ORDER — SODIUM CHLORIDE 0.9 % (FLUSH) 0.9 %
10 SYRINGE (ML) INJECTION EVERY 12 HOURS SCHEDULED
Status: DISCONTINUED | OUTPATIENT
Start: 2022-03-16 | End: 2022-03-16 | Stop reason: HOSPADM

## 2022-03-16 RX ORDER — LIDOCAINE HYDROCHLORIDE 20 MG/ML
INJECTION, SOLUTION INFILTRATION; PERINEURAL AS NEEDED
Status: DISCONTINUED | OUTPATIENT
Start: 2022-03-16 | End: 2022-03-16 | Stop reason: SURG

## 2022-03-16 RX ORDER — SODIUM CHLORIDE 0.9 % (FLUSH) 0.9 %
10 SYRINGE (ML) INJECTION AS NEEDED
Status: DISCONTINUED | OUTPATIENT
Start: 2022-03-16 | End: 2022-03-16 | Stop reason: HOSPADM

## 2022-03-16 RX ORDER — SODIUM CHLORIDE, SODIUM LACTATE, POTASSIUM CHLORIDE, CALCIUM CHLORIDE 600; 310; 30; 20 MG/100ML; MG/100ML; MG/100ML; MG/100ML
30 INJECTION, SOLUTION INTRAVENOUS CONTINUOUS PRN
Status: DISCONTINUED | OUTPATIENT
Start: 2022-03-16 | End: 2022-03-16 | Stop reason: HOSPADM

## 2022-03-16 RX ADMIN — PROPOFOL 100 MCG/KG/MIN: 10 INJECTION, EMULSION INTRAVENOUS at 11:26

## 2022-03-16 RX ADMIN — LIDOCAINE HYDROCHLORIDE 60 MG: 20 INJECTION, SOLUTION INFILTRATION; PERINEURAL at 11:26

## 2022-03-16 RX ADMIN — SODIUM CHLORIDE, POTASSIUM CHLORIDE, SODIUM LACTATE AND CALCIUM CHLORIDE 30 ML/HR: 600; 310; 30; 20 INJECTION, SOLUTION INTRAVENOUS at 10:51

## 2022-03-16 NOTE — ANESTHESIA PREPROCEDURE EVALUATION
Anesthesia Evaluation     Patient summary reviewed and Nursing notes reviewed                Airway   Mallampati: II  TM distance: >3 FB  Neck ROM: full  No difficulty expected  Dental      Comment: Jagged edged multiple teeth    Pulmonary - negative pulmonary ROS and normal exam   Cardiovascular - normal exam    Patient on routine beta blocker    (+) hypertension less than 2 medications,       Neuro/Psych- negative ROS  GI/Hepatic/Renal/Endo    (+) obesity,   renal disease,     Musculoskeletal (-) negative ROS    Abdominal    Substance History - negative use     OB/GYN negative ob/gyn ROS         Other                        Anesthesia Plan    ASA 2     MAC     intravenous induction     Anesthetic plan, all risks, benefits, and alternatives have been provided, discussed and informed consent has been obtained with: patient.        CODE STATUS:

## 2022-03-16 NOTE — ANESTHESIA POSTPROCEDURE EVALUATION
Patient: Arsenio Beard    Procedure Summary     Date: 03/16/22 Room / Location:  [facility]    Anesthesia Start: 1123 Anesthesia Stop: 1142    Procedure: ESOPHAGOGASTRODUODENOSCOPY WITH BIOPSIES (N/A Esophagus) Diagnosis:       Gastroesophageal reflux disease, unspecified whether esophagitis present      Gastritis      (Gastroesophageal reflux disease, unspecified whether esophagitis present [K21.9])    Surgeons: Chandu Eric MD Provider: Ellie Arenas MD    Anesthesia Type: MAC ASA Status: 2          Anesthesia Type: MAC    Vitals  Vitals Value Taken Time   /75 03/16/22 1141   Temp     Pulse 66 03/16/22 1141   Resp 16 03/16/22 1141   SpO2 96 % 03/16/22 1141           Post Anesthesia Care and Evaluation    Patient location during evaluation: bedside  Patient participation: complete - patient participated  Level of consciousness: awake  Pain management: adequate  Airway patency: patent  Anesthetic complications: No anesthetic complications    Cardiovascular status: acceptable  Respiratory status: acceptable  Hydration status: acceptable

## 2022-03-16 NOTE — BRIEF OP NOTE
ESOPHAGOGASTRODUODENOSCOPY  Progress Note    Arsenio Beard  3/16/2022    Pre-op Diagnosis:   Gastroesophageal reflux disease, unspecified whether esophagitis present [K21.9]       Post-Op Diagnosis Codes:     * Gastroesophageal reflux disease, unspecified whether esophagitis present [K21.9]     * Gastritis [K29.70]    Procedure/CPT® Codes:        Procedure(s):  ESOPHAGOGASTRODUODENOSCOPY WITH BIOPSIES    Surgeon(s):  Chandu Eric MD    Anesthesia: Monitored Anesthesia Care    Staff:   Endo Technician: Esme Morrison, JORI  Endo Nurse: Ying Sheppard RN         Estimated Blood Loss: minimal    Urine Voided: * No values recorded between 3/16/2022 11:22 AM and 3/16/2022 11:37 AM *    Specimens:                Specimens     ID Source Type Tests Collected By Collected At Frozen?    A Stomach Tissue · TISSUE PATHOLOGY EXAM   Chandu Eric MD 3/16/22 8093     Description: ANTRUM BIOPSIES    B Esophagus, Mid Tissue · TISSUE PATHOLOGY EXAM   Chandu Eric MD 3/16/22 1467     Description: MID ESOPHAGUS BIOPSIES                Drains: * No LDAs found *    Findings: EGD revealed normal esophageal tissue throughout biopsies of the midesophagus were obtained to rule out eosinophilic esophagitis.  Gastric mucosa showed striped gastritis in the antrum biopsies of the antrum were obtained.  Retroflexed view the GE junction was normal as was the duodenum.    Complications: None          Chandu Eric MD     Date: 3/16/2022  Time: 11:38 EDT

## 2022-03-16 NOTE — H&P
[facility]  Pre Procedure History & Physical    Chief Complaint:   GERD with dysphagia    Subjective     HPI:   Patient 41-year-old male with history of hypertension, reflux with heartburn complaining of intermittent dysphagia.  Patient here for EGD.    Past Medical History:   Past Medical History:   Diagnosis Date   • At risk for sleep apnea     SLEEP STUDY WAS NEGATIVE   • Depression 06/1982   • Difficulty swallowing    • GERD (gastroesophageal reflux disease)    • Hernia 02/02/2021    Repaired on 02/23/2021   • History of COVID-19     11/2020   • History of staph infection 2008    ON BACK   • Hypertension    • Kidney cysts    • Low back pain    • Mass of knee     RIGHT   • Obesity 06/2003   • Right groin pain    • Seasonal allergies    • Visual impairment 01/1986       Past Surgical History:  Past Surgical History:   Procedure Laterality Date   • EXCISION MASS LEG Right 10/27/2021    Procedure: MASS SOFT TISSUE EXCISION rIGHT KNEE MASS;  Surgeon: Abdulkadir Ham MD;  Location: [facility];  Service: Orthopedics;  Laterality: Right;   • FACIAL RECONSTRUCTION SURGERY      as a child   • INGUINAL HERNIA REPAIR Right 2/23/2021    Procedure: right INGUINAL HERNIA REPAIR LAPAROSCOPIC WITH DAVINCI ROBOT;  Surgeon: Hiram Ocampo MD;  Location: [facility];  Service: DaVinci;  Laterality: Right;   • INGUINAL HERNIA REPAIR  02/23/2021       Family History:  Family History   Adopted: Yes   Problem Relation Age of Onset   • Heart disease Maternal Grandmother    • Heart attack Maternal Grandmother    • Malig Hyperthermia Neg Hx        Social History:   reports that he has never smoked. He has never used smokeless tobacco. He reports that he does not drink alcohol and does not use drugs.    Medications:   Medications Prior to Admission   Medication Sig Dispense Refill Last Dose   • fluticasone (FLONASE) 50 MCG/ACT nasal spray 2 sprays into the nostril(s) as directed by provider Daily.    "3/15/2022 at Unknown time   • Multiple Vitamins-Minerals (EMERGEN-C IMMUNE PO) Take  by mouth Daily.   3/15/2022 at Unknown time   • nadolol (CORGARD) 20 MG tablet Take 1 tablet by mouth Daily. 90 tablet 1 3/16/2022 at Unknown time   • pantoprazole (PROTONIX) 40 MG EC tablet Take 1 tablet by mouth Daily. 90 tablet 3 3/15/2022 at Unknown time   • ibuprofen (ADVIL,MOTRIN) 800 MG tablet Take 1 tablet by mouth Every 6 (Six) Hours As Needed for Mild Pain . HOLD PRIOR TO SURG 60 tablet 0 More than a month at Unknown time       Allergies:  Patient has no known allergies.    ROS:    Pertinent items are noted in HPI     Objective     Blood pressure 126/80, pulse 58, temperature 97.9 °F (36.6 °C), temperature source Oral, resp. rate 16, height 181.6 cm (71.5\"), weight 112 kg (248 lb), SpO2 96 %.    Physical Exam   Constitutional: Pt is oriented to person, place, and time and well-developed, well-nourished, and in no distress.   Mouth/Throat: Oropharynx is clear and moist.   Neck: Normal range of motion.   Cardiovascular: Normal rate, regular rhythm and normal heart sounds.    Pulmonary/Chest: Effort normal and breath sounds normal.   Abdominal: Soft. Nontender  Skin: Skin is warm and dry.   Psychiatric: Mood, memory, affect and judgment normal.     Assessment/Plan     Diagnosis:  GERD  Oropharyngeal dysphagia    Anticipated Surgical Procedure:  EGD    The risks, benefits, and alternatives of this procedure have been discussed with the patient or the responsible party- the patient understands and agrees to proceed.                                                          "

## 2022-03-16 NOTE — DISCHARGE INSTRUCTIONS
For the next 24 hours patient needs to be with a responsible adult.    For 24 hours DO NOT drive, operate machinery, appliances, drink alcohol, make important decisions or sign legal documents.    Start with a light or bland diet if you are feeling sick to your stomach otherwise advance to regular diet as tolerated.    Follow recommendations on procedure report if provided by your doctor.    Call Dr Eric for problems [phone]    Problems may include but not limited to: large amounts of bleeding, trouble breathing, repeated vomiting, severe unrelieved pain, fever or chills.

## 2022-03-17 LAB
LAB AP CASE REPORT: NORMAL
PATH REPORT.FINAL DX SPEC: NORMAL
PATH REPORT.GROSS SPEC: NORMAL

## 2022-03-25 ENCOUNTER — TRANSCRIBE ORDERS (OUTPATIENT)
Dept: ADMINISTRATIVE | Facility: HOSPITAL | Age: 42
End: 2022-03-25

## 2022-03-25 DIAGNOSIS — J32.9 CHRONIC SINUSITIS, UNSPECIFIED LOCATION: Primary | ICD-10-CM

## 2022-03-29 ENCOUNTER — TELEPHONE (OUTPATIENT)
Dept: GASTROENTEROLOGY | Facility: CLINIC | Age: 42
End: 2022-03-29

## 2022-03-29 NOTE — TELEPHONE ENCOUNTER
----- Message from Chandu Eric MD sent at 3/28/2022 12:32 PM EDT -----  Biopsies with gastritis and esophagitis with no H. pylori and no eosinophilia.  Continue PPI if not improving will change PPI to try to get more effect.

## 2022-04-08 DIAGNOSIS — R73.01 ELEVATED FASTING GLUCOSE: Primary | ICD-10-CM

## 2022-04-15 LAB
ALBUMIN SERPL-MCNC: 4.5 G/DL (ref 4–5)
ALBUMIN/GLOB SERPL: 1.6 {RATIO} (ref 1.2–2.2)
ALP SERPL-CCNC: 54 IU/L (ref 44–121)
ALT SERPL-CCNC: 44 IU/L (ref 0–44)
AST SERPL-CCNC: 19 IU/L (ref 0–40)
BILIRUB SERPL-MCNC: 0.7 MG/DL (ref 0–1.2)
BUN SERPL-MCNC: 16 MG/DL (ref 6–24)
BUN/CREAT SERPL: 14 (ref 9–20)
CALCIUM SERPL-MCNC: 9.3 MG/DL (ref 8.7–10.2)
CHLORIDE SERPL-SCNC: 108 MMOL/L (ref 96–106)
CO2 SERPL-SCNC: 23 MMOL/L (ref 20–29)
CREAT SERPL-MCNC: 1.12 MG/DL (ref 0.76–1.27)
EGFRCR SERPLBLD CKD-EPI 2021: 84 ML/MIN/1.73
GLOBULIN SER CALC-MCNC: 2.9 G/DL (ref 1.5–4.5)
GLUCOSE SERPL-MCNC: 132 MG/DL (ref 65–99)
HBA1C MFR BLD: 5.7 % (ref 4.8–5.6)
POTASSIUM SERPL-SCNC: 4.4 MMOL/L (ref 3.5–5.2)
PROT SERPL-MCNC: 7.4 G/DL (ref 6–8.5)
SODIUM SERPL-SCNC: 150 MMOL/L (ref 134–144)

## 2022-04-22 ENCOUNTER — OFFICE VISIT (OUTPATIENT)
Dept: INTERNAL MEDICINE | Facility: CLINIC | Age: 42
End: 2022-04-22

## 2022-04-22 VITALS
WEIGHT: 243 LBS | HEART RATE: 83 BPM | SYSTOLIC BLOOD PRESSURE: 124 MMHG | OXYGEN SATURATION: 98 % | BODY MASS INDEX: 33.42 KG/M2 | DIASTOLIC BLOOD PRESSURE: 100 MMHG

## 2022-04-22 DIAGNOSIS — I10 ESSENTIAL HYPERTENSION: Primary | ICD-10-CM

## 2022-04-22 DIAGNOSIS — R73.01 ELEVATED FASTING GLUCOSE: ICD-10-CM

## 2022-04-22 DIAGNOSIS — R73.03 PREDIABETES: ICD-10-CM

## 2022-04-22 PROCEDURE — 99213 OFFICE O/P EST LOW 20 MIN: CPT | Performed by: NURSE PRACTITIONER

## 2022-04-22 RX ORDER — AMANTADINE HYDROCHLORIDE 100 MG/1
TABLET ORAL
COMMUNITY
Start: 2022-04-12 | End: 2022-10-27

## 2022-04-22 RX ORDER — NADOLOL 20 MG/1
20 TABLET ORAL DAILY
Qty: 90 TABLET | Refills: 1 | Status: SHIPPED | OUTPATIENT
Start: 2022-04-22 | End: 2022-10-27 | Stop reason: SDUPTHER

## 2022-04-22 NOTE — PROGRESS NOTES
Subjective   Arsenio Beard is a 41 y.o. male. Patient is here today for   Chief Complaint   Patient presents with   • Hypertension          Vitals:    04/22/22 1413   BP: 124/100   Pulse: 83   SpO2: 98%     Body mass index is 33.42 kg/m².  The following portions of the patient's history were reviewed and updated as appropriate: allergies, current medications, past family history, past medical history, past social history, past surgical history and problem list.    Past Medical History:   Diagnosis Date   • At risk for sleep apnea     SLEEP STUDY WAS NEGATIVE   • Depression 06/1982   • Difficulty swallowing    • GERD (gastroesophageal reflux disease)    • Hernia 02/02/2021    Repaired on 02/23/2021   • History of COVID-19     11/2020   • History of staph infection 2008    ON BACK   • Hypertension    • Kidney cysts    • Low back pain    • Mass of knee     RIGHT   • Obesity 06/2003   • Right groin pain    • Seasonal allergies    • Visual impairment 01/1986      No Known Allergies   Social History     Socioeconomic History   • Marital status: Single   Tobacco Use   • Smoking status: Never Smoker   • Smokeless tobacco: Never Used   Vaping Use   • Vaping Use: Never used   Substance and Sexual Activity   • Alcohol use: Never   • Drug use: Never     Comment: STEROID USE X3 YEARS/[id number]   • Sexual activity: Defer     Partners: Female     Birth control/protection: None     Comment: Am a [employer]. Abstained from sexual intimacy since 2006        Current Outpatient Medications:   •  amantadine (SYMMETREL) 100 MG tablet, , Disp: , Rfl:   •  fluticasone (FLONASE) 50 MCG/ACT nasal spray, 2 sprays into the nostril(s) as directed by provider Daily., Disp: , Rfl:   •  Multiple Vitamins-Minerals (EMERGEN-C IMMUNE PO), Take  by mouth Daily., Disp: , Rfl:   •  nadolol (CORGARD) 20 MG tablet, Take 1 tablet by mouth Daily., Disp: 90 tablet, Rfl: 1  •  pantoprazole (PROTONIX) 40 MG EC tablet, Take 1 tablet by mouth Daily., Disp: 90  tablet, Rfl: 3     Objective     History of Present Illness Arsenio is a 41 year old male patient who is here for a follow up for HTN and Hyperglyecmia. He has been doing well. He saw ENT and is being followed by pain management and [place] [facility] surgery for back pain  He has been compliant with his medication.   I reviewed labs with him in detail  Orders Only on 04/08/2022   Component Date Value Ref Range Status   • Hemoglobin A1C 04/14/2022 5.7 (A) 4.8 - 5.6 % Final    Comment:          Prediabetes: 5.7 - 6.4           Diabetes: >6.4           Glycemic control for adults with diabetes: <7.0     • Glucose 04/14/2022 132 (A) 65 - 99 mg/dL Final   • BUN 04/14/2022 16  6 - 24 mg/dL Final   • Creatinine 04/14/2022 1.12  0.76 - 1.27 mg/dL Final   • EGFR Result 04/14/2022 84  >59 mL/min/1.73 Final   • BUN/Creatinine Ratio 04/14/2022 14  9 - 20 Final   • Sodium 04/14/2022 150 (A) 134 - 144 mmol/L Final   • Potassium 04/14/2022 4.4  3.5 - 5.2 mmol/L Final   • Chloride 04/14/2022 108 (A) 96 - 106 mmol/L Final   • Total CO2 04/14/2022 23  20 - 29 mmol/L Final   • Calcium 04/14/2022 9.3  8.7 - 10.2 mg/dL Final   • Total Protein 04/14/2022 7.4  6.0 - 8.5 g/dL Final   • Albumin 04/14/2022 4.5  4.0 - 5.0 g/dL Final   • Globulin 04/14/2022 2.9  1.5 - 4.5 g/dL Final   • A/G Ratio 04/14/2022 1.6  1.2 - 2.2 Final   • Total Bilirubin 04/14/2022 0.7  0.0 - 1.2 mg/dL Final   • Alkaline Phosphatase 04/14/2022 54  44 - 121 IU/L Final   • AST (SGOT) 04/14/2022 19  0 - 40 IU/L Final   • ALT (SGPT) 04/14/2022 44  0 - 44 IU/L Final         Review of Systems   Respiratory: Negative.    Cardiovascular: Negative.    Musculoskeletal: Positive for back pain.   Neurological: Negative.        Physical Exam  Vitals and nursing note reviewed.   Constitutional:       General: He is not in acute distress.     Appearance: Normal appearance. He is well-developed and well-groomed.   HENT:      Head: Normocephalic.   Cardiovascular:      Rate and Rhythm:  Normal rate and regular rhythm.      Comments: BP recheck 122/78   Pulmonary:      Effort: Pulmonary effort is normal.      Breath sounds: Normal breath sounds.   Skin:     General: Skin is warm and dry.   Neurological:      Mental Status: He is alert and oriented to person, place, and time.   Psychiatric:         Mood and Affect: Mood normal.         ASSESSMENT     Problems Addressed this Visit     Essential hypertension - Primary    Relevant Medications    nadolol (CORGARD) 20 MG tablet      Other Visit Diagnoses     Elevated fasting glucose        Prediabetes          Diagnoses       Codes Comments    Essential hypertension    -  Primary ICD-10-CM: I10  ICD-9-CM: 401.9     Elevated fasting glucose     ICD-10-CM: R73.01  ICD-9-CM: 790.21     Prediabetes     ICD-10-CM: R73.03  ICD-9-CM: 790.29           PLAN  A1C is in the prediabetic range- discussed walking daily, dietary changes, and hand out given on diet. Offered referral to diabetic education but declined  HTN is well controlled     Return in about 6 months (around 10/22/2022) for Annual physical, with labs a1c .

## 2022-04-25 ENCOUNTER — TELEPHONE (OUTPATIENT)
Dept: INTERNAL MEDICINE | Facility: CLINIC | Age: 42
End: 2022-04-25

## 2022-04-25 NOTE — TELEPHONE ENCOUNTER
Recommend symptom treatment for now  He can schedule an appt or go to the urgent care if symptoms do not improve

## 2022-04-27 ENCOUNTER — OFFICE VISIT (OUTPATIENT)
Dept: INTERNAL MEDICINE | Facility: CLINIC | Age: 42
End: 2022-04-27

## 2022-04-27 VITALS
OXYGEN SATURATION: 97 % | WEIGHT: 243 LBS | DIASTOLIC BLOOD PRESSURE: 78 MMHG | HEART RATE: 63 BPM | BODY MASS INDEX: 32.91 KG/M2 | RESPIRATION RATE: 16 BRPM | HEIGHT: 72 IN | SYSTOLIC BLOOD PRESSURE: 120 MMHG

## 2022-04-27 DIAGNOSIS — R05.9 COUGH: Primary | ICD-10-CM

## 2022-04-27 DIAGNOSIS — J06.9 ACUTE URI: ICD-10-CM

## 2022-04-27 LAB
EXPIRATION DATE: NORMAL
FLUAV AG UPPER RESP QL IA.RAPID: NOT DETECTED
FLUBV AG UPPER RESP QL IA.RAPID: NOT DETECTED
INTERNAL CONTROL: NORMAL
Lab: NORMAL
SARS-COV-2 AG UPPER RESP QL IA.RAPID: NOT DETECTED

## 2022-04-27 PROCEDURE — 99213 OFFICE O/P EST LOW 20 MIN: CPT | Performed by: NURSE PRACTITIONER

## 2022-04-27 PROCEDURE — 87428 SARSCOV & INF VIR A&B AG IA: CPT | Performed by: NURSE PRACTITIONER

## 2022-04-27 RX ORDER — BENZONATATE 100 MG/1
100 CAPSULE ORAL 3 TIMES DAILY PRN
Qty: 30 CAPSULE | Refills: 1 | Status: SHIPPED | OUTPATIENT
Start: 2022-04-27 | End: 2022-10-27

## 2022-04-27 NOTE — PROGRESS NOTES
Subjective   Arsenio Beard is a 41 y.o. male.   Chief Complaint   Patient presents with   • Laryngitis   • Nose Bleed   • SINUS CONGESTION   Answers for HPI/ROS submitted by the patient on 4/25/2022  What is the primary reason for your visit?: Other  Please describe your symptoms.: Cough, sinuses clogged, diarrhea, and loss of voice  Have you had these symptoms before?: No  How long have you been having these symptoms?: 1-4 days  Please list any medications you are currently taking for this condition.: Flonase, Nadol, pantoprazole, a cream containing Amatadine, gabapentin, pitoxicam, ketamine, prilocaine, and menthol.  Please describe any probable cause for these symptoms. 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 However, my bosses daughter had a bad illness (something like RSV). I was in direct contact with my boss the morning they came out of the ER.      Vitals:    04/27/22 0802   BP: 120/78   Pulse: 63   Resp: 16   SpO2: 97%     No LMP for male patient.    Arsenio is a 41 year old male patient who is here for an acute visit. He c/o URI symptoms that started on 4/22/22.     Laryngitis  This is a new problem. The current episode started in the past 7 days. The problem occurs constantly. The problem has been unchanged. Associated symptoms include congestion, coughing (productive with green sputum, has also has noticed some blood tinged sputum ), fatigue and a sore throat. Pertinent negatives include no arthralgias, chest pain or fever. Associated symptoms comments: Laryngitis . He has tried rest (afrin, flonase ) for the symptoms.        The following portions of the patient's history were reviewed and updated as appropriate: allergies, current medications, past family history, past medical history, past social history, past surgical history and problem list.    Review of Systems   Constitutional: Positive for fatigue. Negative for fever.    HENT: Positive for congestion, postnasal drip, rhinorrhea, sinus pressure, sore throat and voice change.    Respiratory: Positive for cough (productive with green sputum, has also has noticed some blood tinged sputum ). Negative for shortness of breath.    Cardiovascular: Negative for chest pain.   Musculoskeletal: Negative for arthralgias.       Objective   Physical Exam  Vitals and nursing note reviewed.   Constitutional:       General: He is not in acute distress.     Appearance: Normal appearance. He is well-developed and well-groomed.   HENT:      Head: Normocephalic.      Right Ear: A middle ear effusion is present.      Left Ear: Tympanic membrane and ear canal normal.      Nose: Mucosal edema and rhinorrhea present. Rhinorrhea is clear.      Mouth/Throat:      Pharynx: Posterior oropharyngeal erythema present.   Eyes:      General: Lids are normal.   Cardiovascular:      Rate and Rhythm: Normal rate and regular rhythm.   Pulmonary:      Effort: Pulmonary effort is normal.      Breath sounds: Normal breath sounds.   Skin:     General: Skin is warm and dry.   Neurological:      Mental Status: He is alert and oriented to person, place, and time.         Assessment/Plan   Diagnoses and all orders for this visit:    1. Cough (Primary)  -     POCT SARS-CoV-2 Antigen MARY + Flu    2. Acute URI    Other orders  -     benzonatate (Tessalon Perles) 100 MG capsule; Take 1 capsule by mouth 3 (Three) Times a Day As Needed for Cough.  Dispense: 30 capsule; Refill: 1      covid and flu swab negative  Continue flonase  He works with pain and dust so I recommend that he take off work the next 3 days   Symptom treatment for 7-10 days  Rest and fluids  Tylenol or motrin   Avoid second hand smoke and allergens   Throat lozenges, humidifier, vicks vapor rub as needed  Follow up if your symptoms persist past 7-10 days or sooner if your symptoms worsen or if you develop new symptoms

## 2022-05-02 ENCOUNTER — TELEPHONE (OUTPATIENT)
Dept: INTERNAL MEDICINE | Facility: CLINIC | Age: 42
End: 2022-05-02

## 2022-05-02 RX ORDER — AMOXICILLIN AND CLAVULANATE POTASSIUM 875; 125 MG/1; MG/1
1 TABLET, FILM COATED ORAL 2 TIMES DAILY
Qty: 20 TABLET | Refills: 0 | Status: SHIPPED | OUTPATIENT
Start: 2022-05-02 | End: 2022-10-27

## 2022-05-02 NOTE — TELEPHONE ENCOUNTER
----- Message from Thomas Campoverde MA sent at 5/2/2022  9:37 AM EDT -----  Regarding: FW: Arsenio Beard    ----- Message -----  From: Arsenio Beard  Sent: 5/2/2022   8:40 AM EDT  To: Richard Solitario [facility] [place]  Subject: Arsenio Beard                                  The cough suppressant helps. Still have a cough, chest congestion, and sinuses are congested. I took the Mucinex until yesterday (ran out yesterday). Still taking the cough suppressant and Flonase. That said yesterday I started to get my voice back. However, it’s still not completely back. What is your advice? Are you wanting me to take an antibiotic or just wait this out?

## 2022-05-03 ENCOUNTER — HOSPITAL ENCOUNTER (OUTPATIENT)
Dept: CT IMAGING | Facility: HOSPITAL | Age: 42
Discharge: HOME | End: 2022-05-03
Admitting: OTOLARYNGOLOGY

## 2022-05-03 DIAGNOSIS — J32.9 CHRONIC SINUSITIS, UNSPECIFIED LOCATION: ICD-10-CM

## 2022-05-03 PROCEDURE — 70486 CT MAXILLOFACIAL W/O DYE: CPT

## 2022-06-22 ENCOUNTER — TRANSCRIBE ORDERS (OUTPATIENT)
Dept: LAB | Facility: SURGERY CENTER | Age: 42
End: 2022-06-22

## 2022-06-22 DIAGNOSIS — Z01.818 OTHER SPECIFIED PRE-OPERATIVE EXAMINATION: Primary | ICD-10-CM

## 2022-07-25 VITALS — HEIGHT: 71 IN | WEIGHT: 250 LBS | BODY MASS INDEX: 35 KG/M2

## 2022-07-26 ENCOUNTER — LAB (OUTPATIENT)
Dept: LAB | Facility: SURGERY CENTER | Age: 42
End: 2022-07-26

## 2022-07-26 DIAGNOSIS — Z01.818 OTHER SPECIFIED PRE-OPERATIVE EXAMINATION: ICD-10-CM

## 2022-07-26 LAB — SARS-COV-2 ORF1AB RESP QL NAA+PROBE: NOT DETECTED

## 2022-07-26 PROCEDURE — U0004 COV-19 TEST NON-CDC HGH THRU: HCPCS | Performed by: OTOLARYNGOLOGY

## 2022-07-28 ENCOUNTER — HOSPITAL ENCOUNTER (OUTPATIENT)
Facility: SURGERY CENTER | Age: 42
Setting detail: HOSPITAL OUTPATIENT SURGERY
Discharge: HOME | End: 2022-07-28
Attending: OTOLARYNGOLOGY | Admitting: OTOLARYNGOLOGY

## 2022-07-28 RX ORDER — SODIUM CHLORIDE 0.9 % (FLUSH) 0.9 %
10 SYRINGE (ML) INJECTION AS NEEDED
Status: DISCONTINUED | OUTPATIENT
Start: 2022-07-28 | End: 2022-07-28 | Stop reason: HOSPADM

## 2022-07-28 RX ORDER — OXYMETAZOLINE HYDROCHLORIDE 0.05 G/100ML
2 SPRAY NASAL
Status: DISCONTINUED | OUTPATIENT
Start: 2022-07-29 | End: 2022-07-28 | Stop reason: HOSPADM

## 2022-07-28 RX ORDER — SODIUM CHLORIDE, SODIUM LACTATE, POTASSIUM CHLORIDE, CALCIUM CHLORIDE 600; 310; 30; 20 MG/100ML; MG/100ML; MG/100ML; MG/100ML
1000 INJECTION, SOLUTION INTRAVENOUS CONTINUOUS
Status: DISCONTINUED | OUTPATIENT
Start: 2022-07-28 | End: 2022-07-28 | Stop reason: HOSPADM

## 2022-07-28 RX ORDER — LIDOCAINE HYDROCHLORIDE 10 MG/ML
0.5 INJECTION, SOLUTION INFILTRATION; PERINEURAL ONCE AS NEEDED
Status: DISCONTINUED | OUTPATIENT
Start: 2022-07-28 | End: 2022-07-28 | Stop reason: HOSPADM

## 2022-08-03 ENCOUNTER — TRANSCRIBE ORDERS (OUTPATIENT)
Dept: LAB | Facility: SURGERY CENTER | Age: 42
End: 2022-08-03

## 2022-08-03 DIAGNOSIS — Z01.818 OTHER SPECIFIED PRE-OPERATIVE EXAMINATION: Primary | ICD-10-CM

## 2022-09-06 ENCOUNTER — APPOINTMENT (OUTPATIENT)
Dept: LAB | Facility: SURGERY CENTER | Age: 42
End: 2022-09-06

## 2022-10-17 DIAGNOSIS — I10 ESSENTIAL HYPERTENSION: Primary | ICD-10-CM

## 2022-10-17 DIAGNOSIS — Z00.00 ROUTINE HEALTH MAINTENANCE: ICD-10-CM

## 2022-10-17 DIAGNOSIS — R73.01 ELEVATED FASTING GLUCOSE: ICD-10-CM

## 2022-10-19 NOTE — ANESTHESIA POSTPROCEDURE EVALUATION
"Patient: Arsenio Beard    Procedure Summary     Date: 02/23/21 Room / Location: [facility] OR  / [facility] MAIN OR    Anesthesia Start: 1008 Anesthesia Stop: 1141    Procedure: right INGUINAL HERNIA REPAIR LAPAROSCOPIC WITH DAVINCI ROBOT (Right Abdomen) Diagnosis:       Right inguinal hernia      (Right inguinal hernia [K40.90])    Surgeon: Hiram Ocapmo MD Provider: Og Mcpherson MD    Anesthesia Type: general ASA Status: 2          Anesthesia Type: general    Vitals  Vitals Value Taken Time   /82 02/23/21 1300   Temp 36.7 °C (98.1 °F) 02/23/21 1245   Pulse 69 02/23/21 1301   Resp 16 02/23/21 1300   SpO2 93 % 02/23/21 1302   Vitals shown include unvalidated device data.        Post Anesthesia Care and Evaluation    Patient location during evaluation: PHASE II  Patient participation: complete - patient participated  Level of consciousness: awake and alert  Pain management: adequate  Airway patency: patent  Anesthetic complications: No anesthetic complications    Cardiovascular status: acceptable  Respiratory status: acceptable  Hydration status: acceptable    Comments: /82   Pulse 76   Temp 36.7 °C (98.1 °F) (Oral)   Resp 16   Ht 180.3 cm (71\")   Wt 109 kg (240 lb)   SpO2 93%   BMI 33.47 kg/m²       " Hydroxyzine Pregnancy And Lactation Text: This medication is not safe during pregnancy and should not be taken. It is also excreted in breast milk and breast feeding isn't recommended.

## 2022-10-21 LAB
ALBUMIN SERPL-MCNC: 4.6 G/DL (ref 3.5–5.2)
ALBUMIN/GLOB SERPL: 1.8 G/DL
ALP SERPL-CCNC: 44 U/L (ref 39–117)
ALT SERPL-CCNC: 17 U/L (ref 1–41)
APPEARANCE UR: CLEAR
AST SERPL-CCNC: 12 U/L (ref 1–40)
BACTERIA #/AREA URNS HPF: NORMAL /HPF
BASOPHILS # BLD AUTO: 0.02 10*3/MM3 (ref 0–0.2)
BASOPHILS NFR BLD AUTO: 0.2 % (ref 0–1.5)
BILIRUB SERPL-MCNC: 0.6 MG/DL (ref 0–1.2)
BILIRUB UR QL STRIP: NEGATIVE
BUN SERPL-MCNC: 22 MG/DL (ref 6–20)
BUN/CREAT SERPL: 17.1 (ref 7–25)
CALCIUM SERPL-MCNC: 9.1 MG/DL (ref 8.6–10.5)
CASTS URNS MICRO: NORMAL
CHLORIDE SERPL-SCNC: 104 MMOL/L (ref 98–107)
CHOLEST SERPL-MCNC: 168 MG/DL (ref 0–200)
CO2 SERPL-SCNC: 26.4 MMOL/L (ref 22–29)
COLOR UR: YELLOW
CREAT SERPL-MCNC: 1.29 MG/DL (ref 0.76–1.27)
EGFRCR SERPLBLD CKD-EPI 2021: 70.6 ML/MIN/1.73
EOSINOPHIL # BLD AUTO: 0.02 10*3/MM3 (ref 0–0.4)
EOSINOPHIL NFR BLD AUTO: 0.2 % (ref 0.3–6.2)
EPI CELLS #/AREA URNS HPF: NORMAL /HPF
ERYTHROCYTE [DISTWIDTH] IN BLOOD BY AUTOMATED COUNT: 12.9 % (ref 12.3–15.4)
GLOBULIN SER CALC-MCNC: 2.5 GM/DL
GLUCOSE SERPL-MCNC: 109 MG/DL (ref 65–99)
GLUCOSE UR QL STRIP: NEGATIVE
HBA1C MFR BLD: 5.4 % (ref 4.8–5.6)
HCT VFR BLD AUTO: 44.6 % (ref 37.5–51)
HDLC SERPL-MCNC: 43 MG/DL (ref 40–60)
HGB BLD-MCNC: 14.9 G/DL (ref 13–17.7)
HGB UR QL STRIP: NEGATIVE
IMM GRANULOCYTES # BLD AUTO: 0.04 10*3/MM3 (ref 0–0.05)
IMM GRANULOCYTES NFR BLD AUTO: 0.4 % (ref 0–0.5)
KETONES UR QL STRIP: NEGATIVE
LDLC SERPL CALC-MCNC: 104 MG/DL (ref 0–100)
LDLC/HDLC SERPL: 2.37 {RATIO}
LEUKOCYTE ESTERASE UR QL STRIP: NEGATIVE
LYMPHOCYTES # BLD AUTO: 1.82 10*3/MM3 (ref 0.7–3.1)
LYMPHOCYTES NFR BLD AUTO: 17.3 % (ref 19.6–45.3)
MCH RBC QN AUTO: 30 PG (ref 26.6–33)
MCHC RBC AUTO-ENTMCNC: 33.4 G/DL (ref 31.5–35.7)
MCV RBC AUTO: 89.7 FL (ref 79–97)
MONOCYTES # BLD AUTO: 0.58 10*3/MM3 (ref 0.1–0.9)
MONOCYTES NFR BLD AUTO: 5.5 % (ref 5–12)
NEUTROPHILS # BLD AUTO: 8.04 10*3/MM3 (ref 1.7–7)
NEUTROPHILS NFR BLD AUTO: 76.4 % (ref 42.7–76)
NITRITE UR QL STRIP: NEGATIVE
NRBC BLD AUTO-RTO: 0.1 /100 WBC (ref 0–0.2)
PH UR STRIP: 6 [PH] (ref 5–8)
PLATELET # BLD AUTO: 242 10*3/MM3 (ref 140–450)
POTASSIUM SERPL-SCNC: 4.5 MMOL/L (ref 3.5–5.2)
PROT SERPL-MCNC: 7.1 G/DL (ref 6–8.5)
PROT UR QL STRIP: NEGATIVE
RBC # BLD AUTO: 4.97 10*6/MM3 (ref 4.14–5.8)
RBC #/AREA URNS HPF: NORMAL /HPF
SODIUM SERPL-SCNC: 142 MMOL/L (ref 136–145)
SP GR UR STRIP: 1.02 (ref 1–1.03)
T4 FREE SERPL-MCNC: 1.03 NG/DL (ref 0.93–1.7)
TRIGL SERPL-MCNC: 116 MG/DL (ref 0–150)
TSH SERPL DL<=0.005 MIU/L-ACNC: 0.39 UIU/ML (ref 0.27–4.2)
UROBILINOGEN UR STRIP-MCNC: NORMAL MG/DL
VLDLC SERPL CALC-MCNC: 21 MG/DL (ref 5–40)
WBC # BLD AUTO: 10.52 10*3/MM3 (ref 3.4–10.8)
WBC #/AREA URNS HPF: NORMAL /HPF

## 2022-10-27 ENCOUNTER — OFFICE VISIT (OUTPATIENT)
Dept: INTERNAL MEDICINE | Facility: CLINIC | Age: 42
End: 2022-10-27

## 2022-10-27 VITALS
RESPIRATION RATE: 16 BRPM | HEART RATE: 57 BPM | SYSTOLIC BLOOD PRESSURE: 118 MMHG | WEIGHT: 220 LBS | OXYGEN SATURATION: 98 % | TEMPERATURE: 96.3 F | DIASTOLIC BLOOD PRESSURE: 68 MMHG | BODY MASS INDEX: 30.8 KG/M2 | HEIGHT: 71 IN

## 2022-10-27 DIAGNOSIS — Z00.00 ANNUAL PHYSICAL EXAM: Primary | ICD-10-CM

## 2022-10-27 PROCEDURE — 99396 PREV VISIT EST AGE 40-64: CPT | Performed by: NURSE PRACTITIONER

## 2022-10-27 RX ORDER — NADOLOL 20 MG/1
20 TABLET ORAL DAILY
Qty: 90 TABLET | Refills: 3 | Status: SHIPPED | OUTPATIENT
Start: 2022-10-27

## 2022-10-27 NOTE — PROGRESS NOTES
Subjective   Arsenio Beard is a 41 y.o. male. Patient is here today for   Chief Complaint   Patient presents with   • Annual Exam          Vitals:    10/27/22 0751   BP: 118/68   Pulse: 57   Resp: 16   Temp: 96.3 °F (35.7 °C)   SpO2: 98%     Body mass index is 30.68 kg/m².    The following portions of the patient's history were reviewed and updated as appropriate: allergies, current medications, past family history, past medical history, past social history, past surgical history and problem list.    Past Medical History:   Diagnosis Date   • At risk for sleep apnea     SLEEP STUDY WAS NEGATIVE   • Depression 06/1982   • Difficulty swallowing    • GERD (gastroesophageal reflux disease)    • Hernia 02/02/2021    Repaired on 02/23/2021   • History of COVID-19     11/2020   • History of staph infection 2008    ON BACK   • Hypertension    • Kidney cysts    • Low back pain    • Mass of knee     RIGHT   • Obesity 06/2003   • Right groin pain    • Seasonal allergies    • Visual impairment 01/1986      No Known Allergies   Social History     Socioeconomic History   • Marital status: Single   Tobacco Use   • Smoking status: Never   • Smokeless tobacco: Never   Vaping Use   • Vaping Use: Never used   Substance and Sexual Activity   • Alcohol use: Never   • Drug use: Never     Comment: STEROID USE X3 YEARS/3196-6590   • Sexual activity: Not Currently     Partners: Female     Birth control/protection: None     Comment: Am a [employer]. Abstained from sexual intimacy since 2006        Current Outpatient Medications:   •  fluticasone (FLONASE) 50 MCG/ACT nasal spray, 2 sprays into the nostril(s) as directed by provider Daily., Disp: , Rfl:   •  nadolol (CORGARD) 20 MG tablet, Take 1 tablet by mouth Daily., Disp: 90 tablet, Rfl: 3  •  pantoprazole (PROTONIX) 40 MG EC tablet, Take 1 tablet by mouth Daily., Disp: 90 tablet, Rfl: 3       Objective   History of Present Illness   Arsenio Beard 41 y.o. male who presents for an  Annual Wellness Visit.  he has a history of   Patient Active Problem List   Diagnosis   • Precordial pain   • Essential hypertension   • Right inguinal hernia   • Knee mass, right   • Annular tear of lumbar disc   • Gastroesophageal reflux disease   ..  Labs results discussed in detail with the patient.  Plan to update vaccines if needed today. He is compliant with his medication and is not experiencing any side effects. He is followed by physiatrist for chronic back pain and sees GI .   He is scheduled for sinus surgery     Health Habits:  Dental Exam. not up to date - .  Eye Exam. not up to date - .  Exercise: 0 times/week.  Current exercise activities include: none and active job, occasionally walks   Diet is well balanced, cut out sugar, has lost 30 lbs     Lab Results (most recent)     Orders Only on 10/17/2022   Component Date Value Ref Range Status   • WBC 10/20/2022 10.52  3.40 - 10.80 10*3/mm3 Final   • RBC 10/20/2022 4.97  4.14 - 5.80 10*6/mm3 Final   • Hemoglobin 10/20/2022 14.9  13.0 - 17.7 g/dL Final   • Hematocrit 10/20/2022 44.6  37.5 - 51.0 % Final   • MCV 10/20/2022 89.7  79.0 - 97.0 fL Final   • MCH 10/20/2022 30.0  26.6 - 33.0 pg Final   • MCHC 10/20/2022 33.4  31.5 - 35.7 g/dL Final   • RDW 10/20/2022 12.9  12.3 - 15.4 % Final   • Platelets 10/20/2022 242  140 - 450 10*3/mm3 Final   • Neutrophil Rel % 10/20/2022 76.4 (H)  42.7 - 76.0 % Final   • Lymphocyte Rel % 10/20/2022 17.3 (L)  19.6 - 45.3 % Final   • Monocyte Rel % 10/20/2022 5.5  5.0 - 12.0 % Final   • Eosinophil Rel % 10/20/2022 0.2 (L)  0.3 - 6.2 % Final   • Basophil Rel % 10/20/2022 0.2  0.0 - 1.5 % Final   • Neutrophils Absolute 10/20/2022 8.04 (H)  1.70 - 7.00 10*3/mm3 Final   • Lymphocytes Absolute 10/20/2022 1.82  0.70 - 3.10 10*3/mm3 Final   • Monocytes Absolute 10/20/2022 0.58  0.10 - 0.90 10*3/mm3 Final   • Eosinophils Absolute 10/20/2022 0.02  0.00 - 0.40 10*3/mm3 Final   • Basophils Absolute 10/20/2022 0.02  0.00 - 0.20  10*3/mm3 Final   • Immature Granulocyte Rel % 10/20/2022 0.4  0.0 - 0.5 % Final   • Immature Grans Absolute 10/20/2022 0.04  0.00 - 0.05 10*3/mm3 Final   • nRBC 10/20/2022 0.1  0.0 - 0.2 /100 WBC Final   • Glucose 10/20/2022 109 (H)  65 - 99 mg/dL Final   • BUN 10/20/2022 22 (H)  6 - 20 mg/dL Final   • Creatinine 10/20/2022 1.29 (H)  0.76 - 1.27 mg/dL Final   • EGFR Result 10/20/2022 70.6  >60.0 mL/min/1.73 Final    Comment: National Kidney Foundation and American Society of  Nephrology (ASN) Task Force recommended calculation based  on the Chronic Kidney Disease Epidemiology Collaboration  (CKD-EPI) equation refit without adjustment for race.  GFR Normal >60  Chronic Kidney Disease <60  Kidney Failure <15     • BUN/Creatinine Ratio 10/20/2022 17.1  7.0 - 25.0 Final   • Sodium 10/20/2022 142  136 - 145 mmol/L Final   • Potassium 10/20/2022 4.5  3.5 - 5.2 mmol/L Final   • Chloride 10/20/2022 104  98 - 107 mmol/L Final   • Total CO2 10/20/2022 26.4  22.0 - 29.0 mmol/L Final   • Calcium 10/20/2022 9.1  8.6 - 10.5 mg/dL Final   • Total Protein 10/20/2022 7.1  6.0 - 8.5 g/dL Final   • Albumin 10/20/2022 4.60  3.50 - 5.20 g/dL Final   • Globulin 10/20/2022 2.5  gm/dL Final   • A/G Ratio 10/20/2022 1.8  g/dL Final   • Total Bilirubin 10/20/2022 0.6  0.0 - 1.2 mg/dL Final   • Alkaline Phosphatase 10/20/2022 44  39 - 117 U/L Final   • AST (SGOT) 10/20/2022 12  1 - 40 U/L Final   • ALT (SGPT) 10/20/2022 17  1 - 41 U/L Final   • Hemoglobin A1C 10/20/2022 5.40  4.80 - 5.60 % Final    Comment: Hemoglobin A1C Ranges:  Increased Risk for Diabetes  5.7% to 6.4%  Diabetes                     >= 6.5%  Diabetic Goal                < 7.0%     • Total Cholesterol 10/20/2022 168  0 - 200 mg/dL Final    Comment: Cholesterol Reference Ranges  (U.S. Department of Health and Human Services ATP III  Classifications)  Desirable          <200 mg/dL  Borderline High    200-239 mg/dL  High Risk          >240 mg/dL  Triglyceride Reference  Ranges  (U.S. Department of Health and Human Services ATP III  Classifications)  Normal           <150 mg/dL  Borderline High  150-199 mg/dL  High             200-499 mg/dL  Very High        >500 mg/dL  HDL Reference Ranges  (U.S. Department of Health and Human Services ATP III  Classifications)  Low     <40 mg/dl (major risk factor for CHD)  High    >60 mg/dl ('negative' risk factor for CHD)  LDL Reference Ranges  (U.S. Department of Health and Human Services ATP III  Classifications)  Optimal          <100 mg/dL  Near Optimal     100-129 mg/dL  Borderline High  130-159 mg/dL  High             160-189 mg/dL  Very High        >189 mg/dL     • Triglycerides 10/20/2022 116  0 - 150 mg/dL Final   • HDL Cholesterol 10/20/2022 43  40 - 60 mg/dL Final   • VLDL Cholesterol Oumar 10/20/2022 21  5 - 40 mg/dL Final   • LDL Chol Calc (NIH) 10/20/2022 104 (H)  0 - 100 mg/dL Final   • LDL/HDL RATIO 10/20/2022 2.37   Final   • TSH 10/20/2022 0.389  0.270 - 4.200 uIU/mL Final   • Specific Gravity, UA 10/20/2022 1.023  1.005 - 1.030 Final   • pH, UA 10/20/2022 6.0  5.0 - 8.0 Final   • Color, UA 10/20/2022 Yellow   Final    REFERENCE RANGE: Yellow, Straw   • Appearance, UA 10/20/2022 Clear  Clear Final   • Leukocytes, UA 10/20/2022 Negative  Negative Final   • Protein 10/20/2022 Negative  Negative Final   • Glucose, UA 10/20/2022 Negative  Negative Final   • Ketones 10/20/2022 Negative  Negative Final   • Blood, UA 10/20/2022 Negative  Negative Final   • Bilirubin, UA 10/20/2022 Negative  Negative Final   • Urobilinogen, UA 10/20/2022 Comment   Final    Comment: 0.2 E.U./dL  REFERENCE RANGE: 0.2 - 1.0 E.U./dL     • Nitrite, UA 10/20/2022 Negative  Negative Final   • WBC, UA 10/20/2022 0-2  /HPF Final    REFERENCE RANGE: None Seen, 0-2   • RBC, UA 10/20/2022 0-2  /HPF Final    REFERENCE RANGE: None Seen, 0-2   • Epithelial Cells (non renal) 10/20/2022 0-2  /HPF Final    REFERENCE RANGE: None Seen, 0-2   • Cast Type 10/20/2022 Comment    Final    HYALINE CASTS, UA            None Seen        /LPF       None Seen  N   • Bacteria, UA 10/20/2022 Comment  None Seen /HPF Final    None Seen   • Free T4 10/20/2022 1.03  0.93 - 1.70 ng/dL Final    Results may be falsely increased if patient taking Biotin.                 Review of Systems   Constitutional: Negative for fatigue.   Eyes: Negative for visual disturbance.   Respiratory: Negative for shortness of breath.    Cardiovascular: Negative for chest pain and palpitations.   Gastrointestinal: Negative.    Genitourinary: Negative for dysuria, frequency and testicular pain.   Musculoskeletal: Positive for back pain. Negative for gait problem.   Neurological: Negative for weakness and numbness.   Psychiatric/Behavioral: Negative for dysphoric mood and sleep disturbance. The patient is not nervous/anxious.        Physical Exam  Vitals and nursing note reviewed.   Constitutional:       General: He is not in acute distress.     Appearance: Normal appearance. He is well-developed and well-groomed.   HENT:      Head: Normocephalic.      Right Ear: Tympanic membrane and ear canal normal.      Left Ear: Tympanic membrane and ear canal normal.      Mouth/Throat:      Pharynx: Oropharynx is clear.   Cardiovascular:      Rate and Rhythm: Normal rate and regular rhythm.      Heart sounds: Normal heart sounds.   Pulmonary:      Effort: Pulmonary effort is normal.      Breath sounds: Normal breath sounds.   Abdominal:      General: Bowel sounds are normal.      Palpations: Abdomen is soft.      Tenderness: There is no abdominal tenderness.   Skin:     General: Skin is warm and dry.   Neurological:      Mental Status: He is alert and oriented to person, place, and time.   Psychiatric:         Mood and Affect: Mood normal.         ASSESSMENT       Problems Addressed this Visit    None  Visit Diagnoses     Annual physical exam    -  Primary      Diagnoses       Codes Comments    Annual physical exam    -  Primary  ICD-10-CM: Z00.00  ICD-9-CM: V70.0           PLAN    BP is well controlled   Overall labs look good, glucose is slightly elevated, but a1c is now normal with dietary changes and weight loss  Recommend TDAP at pharmacy due to insurance  Age and sex appropriate physical exam performed and documented. Updated past medical, family, social and surgical histories as well as allergies and care team list. Addressed care gaps listed in the medical record.   -Encouraged seat belt use for every car ride for patient and all occupants. Discussed securing of all guns in the home for patient and family protection. Encouraged sunscreen use to reduce risk of skin cancer for any days with sun exposure over 20 minutes. Recommended helmet if biking or riding motorcycle to prevent head trauma. Discussed the importance of smoke and carbon monoxide detectors in the home.   -Encouraged annual dental and vision exams as part of their overall health.   -Encouraged minimum of 30 minutes or more of exercise at a brisk walk or higher 5 days per week combined with a well-balanced diet.   -Immunizations reviewed and updated in EMR.     Return in about 1 year (around 10/27/2023) for Annual physical, with labs. including a1c

## 2022-12-07 RX ORDER — PANTOPRAZOLE SODIUM 40 MG/1
TABLET, DELAYED RELEASE ORAL
Qty: 90 TABLET | Refills: 3 | Status: SHIPPED | OUTPATIENT
Start: 2022-12-07

## 2023-03-31 ENCOUNTER — OFFICE VISIT (OUTPATIENT)
Dept: INTERNAL MEDICINE | Facility: CLINIC | Age: 43
End: 2023-03-31
Payer: COMMERCIAL

## 2023-03-31 VITALS
SYSTOLIC BLOOD PRESSURE: 111 MMHG | HEIGHT: 71 IN | DIASTOLIC BLOOD PRESSURE: 72 MMHG | WEIGHT: 223 LBS | BODY MASS INDEX: 31.22 KG/M2

## 2023-03-31 DIAGNOSIS — M79.641 RIGHT HAND PAIN: Primary | ICD-10-CM

## 2023-03-31 NOTE — PROGRESS NOTES
Pipo Damon D.O.  Internal Medicine  [facility]  [address], [address]  [place] [address]  [phone]      Chief Complaint  Right hand pain (X 3 weeks)    SUBJECTIVE    History of Present Illness    Arsenio Beard is a 42 y.o. male who presents to the office today as an established patient of Michelle LING here today for an acute care visit.     Answers for HPI/ROS submitted by the patient on 3/31/2023  What is the primary reason for your visit?: Other  Please describe your symptoms.: Pain from the knuckle closest to my writst to the middle knuckle of my right index finger  Have you had these symptoms before?: Yes  How long have you been having these symptoms?: Greater than 2 weeks  Please list any medications you are currently taking for this condition.: 800 mg of Ibuprofen  Please describe any probable cause for these symptoms. 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 Clicked on a mouse for a several hours. Since it has been hurting.    Right hand/finger pain, going on for 3 weeks. 2nd finger only. The pain is from the base of the finger to the first knuckle. No known injuries. States he wrote 80 pages in 2017 or 2018 and this pain happened. The pain at that time just went away on its own. He was in prison at that time and didn't have access to medical care.   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 It also hurts at times if he is using a mouse. He states it feels swollen to him but it doesn't look like it is. The finger does feel tight when he closes his hand. No redness of the skin. No fever or chills.   States around 6294-5504 he had a laceration on this hand and he is worried this is a consequence. He is using ibuprofen as needed. The pain is constant and sharp in nature. The pain is mild in nature to him. He is right handed.     No Known Allergies     Outpatient Medications Marked as Taking for  "the 3/31/23 encounter (Office Visit) with Pipo Damon, DO   Medication Sig Dispense Refill   • fluticasone (FLONASE) 50 MCG/ACT nasal spray 2 sprays into the nostril(s) as directed by provider Daily.     • nadolol (CORGARD) 20 MG tablet Take 1 tablet by mouth Daily. 90 tablet 3   • pantoprazole (PROTONIX) 40 MG EC tablet TAKE ONE TABLET BY MOUTH DAILY 90 tablet 3        Past Medical History:   Diagnosis Date   • At risk for sleep apnea     SLEEP STUDY WAS NEGATIVE   • Depression 06/1982   • Difficulty swallowing    • GERD (gastroesophageal reflux disease)    • Hernia 02/02/2021    Repaired on 02/23/2021   • History of COVID-19     11/2020   • History of staph infection 2008    ON BACK   • Hypertension    • Kidney cysts    • Low back pain    • Mass of knee     RIGHT   • Obesity 06/2003   • Right groin pain    • Seasonal allergies    • Visual impairment 01/1986       OBJECTIVE    Vital Signs:   /72   Ht 180.3 cm (71\")   Wt 101 kg (223 lb)   BMI 31.10 kg/m²     Physical Exam  Vitals reviewed.   Constitutional:       General: He is not in acute distress.     Appearance: He is obese. He is not ill-appearing.   Eyes:      General: No scleral icterus.  Musculoskeletal:      Comments: Right hand and fingers with no visible abnormality nor swelling/erythema when compared to the right. There is tenderness to palpation at both the right 2nd MCP and PIP joints. No evidence of synovitis. Intact  bilaterally.    Neurological:      Mental Status: He is alert.   Psychiatric:         Mood and Affect: Mood normal.         Behavior: Behavior normal.         Thought Content: Thought content normal.                             ASSESSMENT & PLAN     Diagnoses and all orders for this visit:    1. Right hand pain (Primary)  -pt here today for acute care visit  -chronic condition with acute exacerbation  -Right hand/finger pain, going on for 3 weeks. 2nd finger only. The pain is from the base of the finger to the first knuckle. " No known injuries. States he wrote 80 pages in 2017 or 2018 and this pain happened. The pain at that time just went away on its own. He was in prison at that time and didn't have access to medical care.   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 It also hurts at times if he is using a mouse. He states it feels swollen to him but it doesn't look like it is. The finger does feel tight when he closes his hand. No redness of the skin. No fever or chills.   -States around 7784-4579 he had a laceration on this hand and he is worried this is a consequence. He is using ibuprofen as needed. The pain is constant and sharp in nature. The pain is mild in nature to him. He is right handed.   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     Follow Up  No follow-ups on file.    Patient/family had no further questions at this time and verbalized understanding of the plan discussed today.

## 2023-04-05 LAB
CCP IGA+IGG SERPL IA-ACNC: 6 UNITS (ref 0–19)
CRP SERPL-MCNC: <1 MG/L (ref 0–10)
RHEUMATOID FACT SERPL-ACNC: <10 IU/ML
SPECIMEN STATUS: NORMAL

## 2023-10-19 DIAGNOSIS — Z00.00 ANNUAL PHYSICAL EXAM: Primary | ICD-10-CM

## 2023-10-19 DIAGNOSIS — R73.01 ELEVATED FASTING GLUCOSE: ICD-10-CM

## 2023-10-25 LAB
ALBUMIN SERPL-MCNC: 4.7 G/DL (ref 3.5–5.2)
ALBUMIN/GLOB SERPL: 2 G/DL
ALP SERPL-CCNC: 49 U/L (ref 39–117)
ALT SERPL-CCNC: 24 U/L (ref 1–41)
APPEARANCE UR: CLEAR
AST SERPL-CCNC: 15 U/L (ref 1–40)
BACTERIA #/AREA URNS HPF: NORMAL /HPF
BASOPHILS # BLD AUTO: 0.04 10*3/MM3 (ref 0–0.2)
BASOPHILS NFR BLD AUTO: 0.5 % (ref 0–1.5)
BILIRUB SERPL-MCNC: 0.6 MG/DL (ref 0–1.2)
BILIRUB UR QL STRIP: NEGATIVE
BUN SERPL-MCNC: 17 MG/DL (ref 6–20)
BUN/CREAT SERPL: 14.2 (ref 7–25)
CALCIUM SERPL-MCNC: 9.5 MG/DL (ref 8.6–10.5)
CASTS URNS MICRO: NORMAL
CHLORIDE SERPL-SCNC: 105 MMOL/L (ref 98–107)
CHOLEST SERPL-MCNC: 147 MG/DL (ref 0–200)
CO2 SERPL-SCNC: 27.7 MMOL/L (ref 22–29)
COLOR UR: YELLOW
CREAT SERPL-MCNC: 1.2 MG/DL (ref 0.76–1.27)
EGFRCR SERPLBLD CKD-EPI 2021: 76.5 ML/MIN/1.73
EOSINOPHIL # BLD AUTO: 0.14 10*3/MM3 (ref 0–0.4)
EOSINOPHIL NFR BLD AUTO: 1.8 % (ref 0.3–6.2)
EPI CELLS #/AREA URNS HPF: NORMAL /HPF
ERYTHROCYTE [DISTWIDTH] IN BLOOD BY AUTOMATED COUNT: 12.9 % (ref 12.3–15.4)
GLOBULIN SER CALC-MCNC: 2.3 GM/DL
GLUCOSE SERPL-MCNC: 127 MG/DL (ref 65–99)
GLUCOSE UR QL STRIP: NEGATIVE
HBA1C MFR BLD: 5.5 % (ref 4.8–5.6)
HCT VFR BLD AUTO: 44.8 % (ref 37.5–51)
HDLC SERPL-MCNC: 37 MG/DL (ref 40–60)
HGB BLD-MCNC: 15.3 G/DL (ref 13–17.7)
HGB UR QL STRIP: NEGATIVE
IMM GRANULOCYTES # BLD AUTO: 0.04 10*3/MM3 (ref 0–0.05)
IMM GRANULOCYTES NFR BLD AUTO: 0.5 % (ref 0–0.5)
KETONES UR QL STRIP: NEGATIVE
LDLC SERPL CALC-MCNC: 91 MG/DL (ref 0–100)
LDLC/HDLC SERPL: 2.44 {RATIO}
LEUKOCYTE ESTERASE UR QL STRIP: NEGATIVE
LYMPHOCYTES # BLD AUTO: 2 10*3/MM3 (ref 0.7–3.1)
LYMPHOCYTES NFR BLD AUTO: 25.5 % (ref 19.6–45.3)
MCH RBC QN AUTO: 30 PG (ref 26.6–33)
MCHC RBC AUTO-ENTMCNC: 34.2 G/DL (ref 31.5–35.7)
MCV RBC AUTO: 87.8 FL (ref 79–97)
MONOCYTES # BLD AUTO: 0.47 10*3/MM3 (ref 0.1–0.9)
MONOCYTES NFR BLD AUTO: 6 % (ref 5–12)
NEUTROPHILS # BLD AUTO: 5.15 10*3/MM3 (ref 1.7–7)
NEUTROPHILS NFR BLD AUTO: 65.7 % (ref 42.7–76)
NITRITE UR QL STRIP: NEGATIVE
NRBC BLD AUTO-RTO: 0 /100 WBC (ref 0–0.2)
PH UR STRIP: 6.5 [PH] (ref 5–8)
PLATELET # BLD AUTO: 213 10*3/MM3 (ref 140–450)
POTASSIUM SERPL-SCNC: 4.5 MMOL/L (ref 3.5–5.2)
PROT SERPL-MCNC: 7 G/DL (ref 6–8.5)
PROT UR QL STRIP: NEGATIVE
RBC # BLD AUTO: 5.1 10*6/MM3 (ref 4.14–5.8)
RBC #/AREA URNS HPF: NORMAL /HPF
SODIUM SERPL-SCNC: 140 MMOL/L (ref 136–145)
SP GR UR STRIP: 1.01 (ref 1–1.03)
TRIGL SERPL-MCNC: 99 MG/DL (ref 0–150)
TSH SERPL DL<=0.005 MIU/L-ACNC: 1.08 UIU/ML (ref 0.27–4.2)
UROBILINOGEN UR STRIP-MCNC: NORMAL MG/DL
VLDLC SERPL CALC-MCNC: 19 MG/DL (ref 5–40)
WBC # BLD AUTO: 7.84 10*3/MM3 (ref 3.4–10.8)
WBC #/AREA URNS HPF: NORMAL /HPF

## 2023-11-01 ENCOUNTER — OFFICE VISIT (OUTPATIENT)
Dept: INTERNAL MEDICINE | Facility: CLINIC | Age: 43
End: 2023-11-01
Payer: COMMERCIAL

## 2023-11-01 VITALS
RESPIRATION RATE: 16 BRPM | WEIGHT: 224 LBS | HEIGHT: 71 IN | DIASTOLIC BLOOD PRESSURE: 70 MMHG | OXYGEN SATURATION: 97 % | BODY MASS INDEX: 31.36 KG/M2 | TEMPERATURE: 96.6 F | SYSTOLIC BLOOD PRESSURE: 110 MMHG | HEART RATE: 68 BPM

## 2023-11-01 DIAGNOSIS — Z00.00 ANNUAL PHYSICAL EXAM: Primary | ICD-10-CM

## 2023-11-01 DIAGNOSIS — B35.3 TINEA PEDIS OF LEFT FOOT: ICD-10-CM

## 2023-11-01 DIAGNOSIS — I10 ESSENTIAL HYPERTENSION: ICD-10-CM

## 2023-11-01 DIAGNOSIS — L60.0 INGROWN LEFT GREATER TOENAIL: ICD-10-CM

## 2023-11-01 DIAGNOSIS — R73.01 ELEVATED FASTING GLUCOSE: ICD-10-CM

## 2023-11-01 RX ORDER — CELECOXIB 200 MG/1
1 CAPSULE ORAL 2 TIMES DAILY
COMMUNITY
Start: 2023-08-15

## 2023-11-01 RX ORDER — NADOLOL 20 MG/1
20 TABLET ORAL DAILY
Qty: 90 TABLET | Refills: 3 | Status: SHIPPED | OUTPATIENT
Start: 2023-11-01

## 2023-11-01 RX ORDER — CLOTRIMAZOLE AND BETAMETHASONE DIPROPIONATE 10; .64 MG/G; MG/G
1 CREAM TOPICAL 2 TIMES DAILY
Qty: 45 G | Refills: 0 | Status: SHIPPED | OUTPATIENT
Start: 2023-11-01

## 2023-11-01 NOTE — PROGRESS NOTES
Subjective   Arsenio Beard is a 42 y.o. male. Patient is here today for   Chief Complaint   Patient presents with    Annual Exam          Vitals:    11/01/23 1006   BP: 110/70   Pulse: 68   Resp: 16   Temp: 96.6 °F (35.9 °C)   SpO2: 97%     Body mass index is 31.24 kg/m².    The following portions of the patient's history were reviewed and updated as appropriate: allergies, current medications, past family history, past medical history, past social history, past surgical history and problem list.    Past Medical History:   Diagnosis Date    At risk for sleep apnea     SLEEP STUDY WAS NEGATIVE    Depression 06/1982    Difficulty swallowing     GERD (gastroesophageal reflux disease)     Hernia 02/02/2021    Repaired on 02/23/2021    History of COVID-19     11/2020    History of staph infection 2008    ON BACK    Hypertension     Kidney cysts     Low back pain     Mass of knee     RIGHT    Obesity 06/2003    Right groin pain     Seasonal allergies     Visual impairment 01/1986      No Known Allergies   Social History     Socioeconomic History    Marital status: Single   Tobacco Use    Smoking status: Never    Smokeless tobacco: Never   Vaping Use    Vaping Use: Never used   Substance and Sexual Activity    Alcohol use: Never    Drug use: Never     Comment: STEROID USE X3 YEARS/2675-7642    Sexual activity: Not Currently     Partners: Female     Birth control/protection: None     Comment: Am a Oriental orthodox. Abstained from sexual intimacy since 2006        Current Outpatient Medications:     celecoxib (CeleBREX) 200 MG capsule, Take 1 capsule by mouth 2 (Two) Times a Day., Disp: , Rfl:     nadolol (CORGARD) 20 MG tablet, Take 1 tablet by mouth Daily., Disp: 90 tablet, Rfl: 3    pantoprazole (PROTONIX) 40 MG EC tablet, TAKE ONE TABLET BY MOUTH DAILY, Disp: 90 tablet, Rfl: 3    clotrimazole-betamethasone (LOTRISONE) 1-0.05 % cream, Apply 1 application  topically to the appropriate area as directed 2 (Two) Times a Day. To  the left foot for 14 days, Disp: 45 g, Rfl: 0     EKG Procedures    ECG Report    Objective   History of Present Illness   Arsenio Beard 42 y.o. male who presents for an Annual physical exam.   Lab results discussed in detail with the patient.  Plan to update vaccines if needed today.    Health Habits:  Dental Exam. not up to date - .  Eye Exam. Note up to date   Exercise: 0 times/week.  Current exercise activities include: none    Preventative counseling  Discussed face to face the importance of healthy diet and exercise.     Lab Results (most recent)       Orders Only on 10/19/2023   Component Date Value Ref Range Status    Glucose 10/25/2023 127 (H)  65 - 99 mg/dL Final    BUN 10/25/2023 17  6 - 20 mg/dL Final    Creatinine 10/25/2023 1.20  0.76 - 1.27 mg/dL Final    EGFR Result 10/25/2023 76.5  >60.0 mL/min/1.73 Final    Comment: GFR Normal >60  Chronic Kidney Disease <60  Kidney Failure <15      BUN/Creatinine Ratio 10/25/2023 14.2  7.0 - 25.0 Final    Sodium 10/25/2023 140  136 - 145 mmol/L Final    Potassium 10/25/2023 4.5  3.5 - 5.2 mmol/L Final    Chloride 10/25/2023 105  98 - 107 mmol/L Final    Total CO2 10/25/2023 27.7  22.0 - 29.0 mmol/L Final    Calcium 10/25/2023 9.5  8.6 - 10.5 mg/dL Final    Total Protein 10/25/2023 7.0  6.0 - 8.5 g/dL Final    Albumin 10/25/2023 4.7  3.5 - 5.2 g/dL Final    Globulin 10/25/2023 2.3  gm/dL Final    A/G Ratio 10/25/2023 2.0  g/dL Final    Total Bilirubin 10/25/2023 0.6  0.0 - 1.2 mg/dL Final    Alkaline Phosphatase 10/25/2023 49  39 - 117 U/L Final    AST (SGOT) 10/25/2023 15  1 - 40 U/L Final    ALT (SGPT) 10/25/2023 24  1 - 41 U/L Final    Total Cholesterol 10/25/2023 147  0 - 200 mg/dL Final    Comment: Cholesterol Reference Ranges  (U.S. Department of Health and Human Services ATP III  Classifications)  Desirable          <200 mg/dL  Borderline High    200-239 mg/dL  High Risk          >240 mg/dL  Triglyceride Reference Ranges  (U.S. Department of Health and  Human Services ATP III  Classifications)  Normal           <150 mg/dL  Borderline High  150-199 mg/dL  High             200-499 mg/dL  Very High        >500 mg/dL  HDL Reference Ranges  (U.S. Department of Health and Human Services ATP III  Classifications)  Low     <40 mg/dl (major risk factor for CHD)  High    >60 mg/dl ('negative' risk factor for CHD)  LDL Reference Ranges  (U.S. Department of Health and Human Services ATP III  Classifications)  Optimal          <100 mg/dL  Near Optimal     100-129 mg/dL  Borderline High  130-159 mg/dL  High             160-189 mg/dL  Very High        >189 mg/dL      Triglycerides 10/25/2023 99  0 - 150 mg/dL Final    HDL Cholesterol 10/25/2023 37 (L)  40 - 60 mg/dL Final    VLDL Cholesterol O[place] 10/25/2023 19  5 - 40 mg/dL Final    LDL Chol Calc (NIH) 10/25/2023 91  0 - 100 mg/dL Final    LDL/HDL RATIO 10/25/2023 2.44   Final    TSH 10/25/2023 1.080  0.270 - 4.200 uIU/mL Final    Specific [place], UA 10/25/2023 1.012  1.005 - 1.030 Final    pH, UA 10/25/2023 6.5  5.0 - 8.0 Final    Color, UA 10/25/2023 Yellow   Final    REFERENCE RANGE: Yellow, Straw    Appearance, UA 10/25/2023 Clear  Clear Final    Leukocytes, UA 10/25/2023 Negative  Negative Final    Protein 10/25/2023 Negative  Negative Final    Glucose, UA 10/25/2023 Negative  Negative Final    Ketones 10/25/2023 Negative  Negative Final    Blood, UA 10/25/2023 Negative  Negative Final    Bilirubin, UA 10/25/2023 Negative  Negative Final    Urobilinogen, UA 10/25/2023 Comment   Final    Comment: 0.2 E.U./dL  REFERENCE RANGE: 0.2 - 1.0 E.U./dL      Nitrite, UA 10/25/2023 Negative  Negative Final    WBC 10/25/2023 7.84  3.40 - 10.80 10*3/mm3 Final    RBC 10/25/2023 5.10  4.14 - 5.80 10*6/mm3 Final    Hemoglobin 10/25/2023 15.3  13.0 - 17.7 g/dL Final    Hematocrit 10/25/2023 44.8  37.5 - 51.0 % Final    MCV 10/25/2023 87.8  79.0 - 97.0 fL Final    MCH 10/25/2023 30.0  26.6 - 33.0 pg Final    MCHC 10/25/2023 34.2  31.5 - 35.7  g/dL Final    RDW 10/25/2023 12.9  12.3 - 15.4 % Final    Platelets 10/25/2023 213  140 - 450 10*3/mm3 Final    Neutrophil Rel % 10/25/2023 65.7  42.7 - 76.0 % Final    Lymphocyte Rel % 10/25/2023 25.5  19.6 - 45.3 % Final    Monocyte Rel % 10/25/2023 6.0  5.0 - 12.0 % Final    Eosinophil Rel % 10/25/2023 1.8  0.3 - 6.2 % Final    Basophil Rel % 10/25/2023 0.5  0.0 - 1.5 % Final    Neutrophils Absolute 10/25/2023 5.15  1.70 - 7.00 10*3/mm3 Final    Lymphocytes Absolute 10/25/2023 2.00  0.70 - 3.10 10*3/mm3 Final    Monocytes Absolute 10/25/2023 0.47  0.10 - 0.90 10*3/mm3 Final    Eosinophils Absolute 10/25/2023 0.14  0.00 - 0.40 10*3/mm3 Final    Basophils Absolute 10/25/2023 0.04  0.00 - 0.20 10*3/mm3 Final    Immature Granulocyte Rel % 10/25/2023 0.5  0.0 - 0.5 % Final    Immature Grans Absolute 10/25/2023 0.04  0.00 - 0.05 10*3/mm3 Final    nRBC 10/25/2023 0.0  0.0 - 0.2 /100 WBC Final    Hemoglobin A1C 10/25/2023 5.50  4.80 - 5.60 % Final    Comment: Hemoglobin A1C Ranges:  Increased Risk for Diabetes  5.7% to 6.4%  Diabetes                     >= 6.5%  Diabetic Goal                < 7.0%      WBC, UA 10/25/2023 0-2  /HPF Final    REFERENCE RANGE: None Seen, 0-2    RBC, UA 10/25/2023 Comment  /HPF Final    Comment: None Seen  REFERENCE RANGE: None Seen, 0-2      Epithelial Cells (non renal) 10/25/2023 0-2  /HPF Final    REFERENCE RANGE: None Seen, 0-2    Cast Type 10/25/2023 Comment   Final    HYALINE CASTS, UA            None Seen        /LPF       None Seen  N    Bacteria, UA 10/25/2023 Comment  None Seen /HPF Final    None Seen                   Review of Systems   Constitutional:  Negative for fatigue.   Respiratory: Negative.     Cardiovascular: Negative.    Genitourinary: Negative.    Musculoskeletal:  Positive for back pain (has an ablation scheduled for november).   Skin:         Ingrown toenails  Dry feet, occasional itching    Neurological: Negative.    Psychiatric/Behavioral: Negative.          Physical Exam  Vitals and nursing note reviewed.   Constitutional:       General: He is not in acute distress.     Appearance: Normal appearance. He is well-developed and well-groomed.   HENT:      Head: Normocephalic.      Right Ear: Tympanic membrane and ear canal normal.      Left Ear: Tympanic membrane and ear canal normal.      Nose: Nose normal.      Mouth/Throat:      Pharynx: Oropharynx is clear.   Eyes:      General: Lids are normal.      Conjunctiva/sclera: Conjunctivae normal.   Neck:      Thyroid: No thyromegaly.   Cardiovascular:      Rate and Rhythm: Normal rate and regular rhythm.      Heart sounds: Normal heart sounds.   Pulmonary:      Effort: Pulmonary effort is normal.      Breath sounds: Normal breath sounds.   Abdominal:      General: Bowel sounds are normal.      Palpations: Abdomen is soft.      Tenderness: There is no abdominal tenderness.   Musculoskeletal:      Cervical back: Neck supple.   Feet:      Right foot:      Toenail Condition: Right toenails are abnormally thick and ingrown.      Left foot:      Skin integrity: Erythema and dry skin (scaly on the bottom of his foot) present. No ulcer or blister.      Toenail Condition: Left toenails are abnormally thick and ingrown.   Neurological:      Mental Status: He is alert and oriented to person, place, and time.   Psychiatric:         Mood and Affect: Mood normal.         ASSESSMENT       Problems Addressed this Visit       Essential hypertension    Relevant Medications    nadolol (CORGARD) 20 MG tablet     Other Visit Diagnoses       Annual physical exam    -  Primary    Elevated fasting glucose        Tinea pedis of left foot        Relevant Medications    clotrimazole-betamethasone (LOTRISONE) 1-0.05 % cream    Ingrown left greater toenail        Relevant Medications    clotrimazole-betamethasone (LOTRISONE) 1-0.05 % cream    Other Relevant Orders    Ambulatory Referral to Podiatry          Diagnoses         Codes Comments     Annual physical exam    -  Primary ICD-10-CM: Z00.00  ICD-9-CM: V70.0     Essential hypertension     ICD-10-CM: I10  ICD-9-CM: 401.9     Elevated fasting glucose     ICD-10-CM: R73.01  ICD-9-CM: 790.21     Tinea pedis of left foot     ICD-10-CM: B35.3  ICD-9-CM: 110.4     Ingrown left greater toenail     ICD-10-CM: L60.0  ICD-9-CM: 703.0             PLAN    HTN is well controlled  Lotrisone for 2 weeks to the left foot for tinea pedis, keep foot clean and dry , change socks often, will refer to podiatry for ingrown great toenails bilaterally   Fasting glucose is elevated but A1c is normal .  Decrease/eliminate soda, caffeine, alcohol and overall caloric intake. Reduce carbohydrates and sweets in diet.  Continue to improve dietary habits with lean proteins, fresh vegetables, fruits, and nuts.   Recommend flu vaccine at pharmacy  Age and sex appropriate physical exam performed and documented. Updated past medical, family, social and surgical histories as well as allergies and care team list. Addressed care gaps listed in the medical record.   - seat belt use for every car ride for patient and all occupants.   sunscreen use to reduce risk of skin cancer for any days with sun exposure over 20 minutes.   -Encouraged annual dental and vision exams as part of their overall health.   -Encouraged  exercise  combined with a well-balanced diet.   -Immunizations reviewed and updated in EMR.       Return in about 1 year (around 11/1/2024) for Annual physical, with labs.  Patient was given instructions and counseling regarding his  condition for health maintenance advice.  Please see specific information pulled into the AVS if appropriate.

## 2023-11-13 ENCOUNTER — OFFICE VISIT (OUTPATIENT)
Dept: INTERNAL MEDICINE | Facility: CLINIC | Age: 43
End: 2023-11-13
Payer: COMMERCIAL

## 2023-11-13 VITALS
OXYGEN SATURATION: 98 % | WEIGHT: 223 LBS | DIASTOLIC BLOOD PRESSURE: 90 MMHG | BODY MASS INDEX: 31.22 KG/M2 | SYSTOLIC BLOOD PRESSURE: 120 MMHG | HEART RATE: 62 BPM | HEIGHT: 71 IN

## 2023-11-13 DIAGNOSIS — M79.671 PAIN OF RIGHT HEEL: Primary | ICD-10-CM

## 2023-11-13 DIAGNOSIS — M72.2 PLANTAR FASCIITIS: ICD-10-CM

## 2023-11-13 PROCEDURE — 3074F SYST BP LT 130 MM HG: CPT | Performed by: INTERNAL MEDICINE

## 2023-11-13 PROCEDURE — 99213 OFFICE O/P EST LOW 20 MIN: CPT | Performed by: INTERNAL MEDICINE

## 2023-11-13 PROCEDURE — 3080F DIAST BP >= 90 MM HG: CPT | Performed by: INTERNAL MEDICINE

## 2023-11-13 NOTE — PROGRESS NOTES
"Subjective     Arsenio Beard is a 43 y.o. male who presents with   Chief Complaint   Patient presents with    Foot Pain       History of Present Illness     C/o heel pain.  Off and on.  Walking 6-12 miles/day.  The more he walks the more it hurts.      Review of Systems   Respiratory: Negative.     Cardiovascular: Negative.        The following portions of the patient's history were reviewed and updated as appropriate: allergies, current medications and problem list.    Patient Active Problem List    Diagnosis Date Noted    Gastroesophageal reflux disease 01/14/2022     Note Last Updated: 1/14/2022     Added automatically from request for surgery [id number]      Knee mass, right 10/21/2021     Note Last Updated: 10/21/2021     Added automatically from request for surgery [id number]      Annular tear of lumbar disc 09/27/2021    Right inguinal hernia 02/17/2021     Note Last Updated: 2/17/2021     Added automatically from request for surgery [id number]      Precordial pain 01/26/2020    Essential hypertension 01/26/2020       Current Outpatient Medications on File Prior to Visit   Medication Sig Dispense Refill    celecoxib (CeleBREX) 200 MG capsule Take 1 capsule by mouth 2 (Two) Times a Day.      clotrimazole-betamethasone (LOTRISONE) 1-0.05 % cream Apply 1 application  topically to the appropriate area as directed 2 (Two) Times a Day. To the left foot for 14 days 45 g 0    nadolol (CORGARD) 20 MG tablet Take 1 tablet by mouth Daily. 90 tablet 3    pantoprazole (PROTONIX) 40 MG EC tablet TAKE ONE TABLET BY MOUTH DAILY 90 tablet 3     No current facility-administered medications on file prior to visit.       Objective     /90   Pulse 62   Ht 180.3 cm (70.98\")   Wt 101 kg (223 lb)   SpO2 98%   BMI 31.12 kg/m²     Physical Exam  Constitutional:       Appearance: He is well-developed.   HENT:      Head: Atraumatic.   Pulmonary:      Effort: Pulmonary effort is normal.   Musculoskeletal:      Right foot: Normal " range of motion. Tenderness present. No swelling, deformity or bony tenderness.   Neurological:      Mental Status: He is alert and oriented to person, place, and time.     X-rays of the right foot  performed today for following indication:   pain.  X-ray reveal nad.  There is no available x-ray for comparison.  X-ray sent to radiology for official interpretation and findings.        Assessment & Plan   Diagnoses and all orders for this visit:    1. Pain of right heel (Primary)  -     XR Foot 3+ View Right (In Office)        Discussion    Patient presents with right heel pain most c/w plantar fasciitis.  UpToDate article provided.  I discussed with the patient a trial of conservative management with:   physical therapy and voltaren gel.  Also consider alternative footwear.  Let me know if not feeling better over the next several weeks or if there is any change in symptoms.         Future Appointments   Date Time Provider Department Center   10/30/2024  8:30 AM [facility]   11/6/2024 11:15 AM Michelle Medina APRN [facility]         Answers submitted by the patient for this visit:  Primary Reason for Visit (Submitted on 11/10/2023)  What is the primary reason for your visit?: Other  Other (Submitted on 11/10/2023)  Please describe your symptoms.: Pain in my R heel. Tender to walk on, and the more I walk the more it hurts. Without shoes or sandals the pain is nore intense.  Have you had these symptoms before?: No  How long have you been having these symptoms?: 1-4 days  Please list any medications you are currently taking for this condition.: None  Please describe any probable cause for these symptoms. : Long distance walking???

## 2023-11-16 ENCOUNTER — TELEPHONE (OUTPATIENT)
Dept: INTERNAL MEDICINE | Facility: CLINIC | Age: 43
End: 2023-11-16
Payer: COMMERCIAL

## 2023-12-04 ENCOUNTER — PATIENT MESSAGE (OUTPATIENT)
Dept: INTERNAL MEDICINE | Facility: CLINIC | Age: 43
End: 2023-12-04
Payer: COMMERCIAL

## 2023-12-04 ENCOUNTER — TELEPHONE (OUTPATIENT)
Dept: INTERNAL MEDICINE | Facility: CLINIC | Age: 43
End: 2023-12-04
Payer: COMMERCIAL

## 2023-12-04 RX ORDER — PANTOPRAZOLE SODIUM 40 MG/1
TABLET, DELAYED RELEASE ORAL
Qty: 90 TABLET | Refills: 3 | OUTPATIENT
Start: 2023-12-04

## 2023-12-04 RX ORDER — PANTOPRAZOLE SODIUM 40 MG/1
40 TABLET, DELAYED RELEASE ORAL DAILY
Qty: 90 TABLET | Refills: 3 | Status: SHIPPED | OUTPATIENT
Start: 2023-12-04

## 2023-12-04 NOTE — TELEPHONE ENCOUNTER
----- Message from Thomas Campoverde MA sent at 12/4/2023  9:50 AM EST -----  Regarding: FW: Arsenio Beard  Contact: [phone]  Will you prescribe this for patient?   Prescribed by Dr Eric previously.   ----- Message -----  From: Arsenio Beard  Sent: 12/4/2023   9:46 AM EST  To: [facility]  Subject: Arsenio Beard                                  Who is the appropriate person I need to see to have my prescription refilled?

## 2023-12-08 ENCOUNTER — TELEPHONE (OUTPATIENT)
Dept: INTERNAL MEDICINE | Facility: CLINIC | Age: 43
End: 2023-12-08
Payer: COMMERCIAL

## 2023-12-08 ENCOUNTER — APPOINTMENT (OUTPATIENT)
Dept: CT IMAGING | Facility: HOSPITAL | Age: 43
End: 2023-12-08
Payer: COMMERCIAL

## 2023-12-08 ENCOUNTER — HOSPITAL ENCOUNTER (OUTPATIENT)
Facility: HOSPITAL | Age: 43
Setting detail: OBSERVATION
Discharge: HOME | End: 2023-12-09
Attending: EMERGENCY MEDICINE | Admitting: EMERGENCY MEDICINE
Payer: COMMERCIAL

## 2023-12-08 ENCOUNTER — APPOINTMENT (OUTPATIENT)
Dept: GENERAL RADIOLOGY | Facility: HOSPITAL | Age: 43
End: 2023-12-08
Payer: COMMERCIAL

## 2023-12-08 DIAGNOSIS — R20.2 ARM PARESTHESIA, LEFT: ICD-10-CM

## 2023-12-08 DIAGNOSIS — R07.9 CHEST PAIN, UNSPECIFIED TYPE: Primary | ICD-10-CM

## 2023-12-08 LAB
ALBUMIN SERPL-MCNC: 4.5 G/DL (ref 3.5–5.2)
ALBUMIN/GLOB SERPL: 1.7 G/DL
ALP SERPL-CCNC: 42 U/L (ref 39–117)
ALT SERPL W P-5'-P-CCNC: 27 U/L (ref 1–41)
ANION GAP SERPL CALCULATED.3IONS-SCNC: 13.4 MMOL/L (ref 5–15)
AST SERPL-CCNC: 17 U/L (ref 1–40)
BASOPHILS # BLD AUTO: 0.03 10*3/MM3 (ref 0–0.2)
BASOPHILS NFR BLD AUTO: 0.4 % (ref 0–1.5)
BILIRUB SERPL-MCNC: 0.6 MG/DL (ref 0–1.2)
BUN SERPL-MCNC: 15 MG/DL (ref 6–20)
BUN/CREAT SERPL: 12.7 (ref 7–25)
CALCIUM SPEC-SCNC: 9.2 MG/DL (ref 8.6–10.5)
CHLORIDE SERPL-SCNC: 106 MMOL/L (ref 98–107)
CHOLEST SERPL-MCNC: 141 MG/DL (ref 0–200)
CO2 SERPL-SCNC: 24.6 MMOL/L (ref 22–29)
CREAT SERPL-MCNC: 1.18 MG/DL (ref 0.76–1.27)
DEPRECATED RDW RBC AUTO: 41.5 FL (ref 37–54)
EGFRCR SERPLBLD CKD-EPI 2021: 77.5 ML/MIN/1.73
EOSINOPHIL # BLD AUTO: 0.21 10*3/MM3 (ref 0–0.4)
EOSINOPHIL NFR BLD AUTO: 2.9 % (ref 0.3–6.2)
ERYTHROCYTE [DISTWIDTH] IN BLOOD BY AUTOMATED COUNT: 12.9 % (ref 12.3–15.4)
GEN 5 2HR TROPONIN T REFLEX: <6 NG/L
GLOBULIN UR ELPH-MCNC: 2.6 GM/DL
GLUCOSE SERPL-MCNC: 96 MG/DL (ref 65–99)
HCT VFR BLD AUTO: 41.1 % (ref 37.5–51)
HDLC SERPL-MCNC: 33 MG/DL (ref 40–60)
HGB BLD-MCNC: 14.2 G/DL (ref 13–17.7)
HOLD SPECIMEN: NORMAL
IMM GRANULOCYTES # BLD AUTO: 0.03 10*3/MM3 (ref 0–0.05)
IMM GRANULOCYTES NFR BLD AUTO: 0.4 % (ref 0–0.5)
LDLC SERPL CALC-MCNC: 90 MG/DL (ref 0–100)
LDLC/HDLC SERPL: 2.68 {RATIO}
LYMPHOCYTES # BLD AUTO: 2.62 10*3/MM3 (ref 0.7–3.1)
LYMPHOCYTES NFR BLD AUTO: 35.9 % (ref 19.6–45.3)
MCH RBC QN AUTO: 30.4 PG (ref 26.6–33)
MCHC RBC AUTO-ENTMCNC: 34.5 G/DL (ref 31.5–35.7)
MCV RBC AUTO: 88 FL (ref 79–97)
MONOCYTES # BLD AUTO: 0.62 10*3/MM3 (ref 0.1–0.9)
MONOCYTES NFR BLD AUTO: 8.5 % (ref 5–12)
NEUTROPHILS NFR BLD AUTO: 3.78 10*3/MM3 (ref 1.7–7)
NEUTROPHILS NFR BLD AUTO: 51.9 % (ref 42.7–76)
NRBC BLD AUTO-RTO: 0 /100 WBC (ref 0–0.2)
NT-PROBNP SERPL-MCNC: <36 PG/ML (ref 0–450)
PLATELET # BLD AUTO: 226 10*3/MM3 (ref 140–450)
PMV BLD AUTO: 10.1 FL (ref 6–12)
POTASSIUM SERPL-SCNC: 4.2 MMOL/L (ref 3.5–5.2)
PROT SERPL-MCNC: 7.1 G/DL (ref 6–8.5)
QT INTERVAL: 412 MS
QTC INTERVAL: 398 MS
RBC # BLD AUTO: 4.67 10*6/MM3 (ref 4.14–5.8)
SODIUM SERPL-SCNC: 144 MMOL/L (ref 136–145)
TRIGL SERPL-MCNC: 98 MG/DL (ref 0–150)
TROPONIN T DELTA: NORMAL
TROPONIN T SERPL HS-MCNC: <6 NG/L
VLDLC SERPL-MCNC: 18 MG/DL (ref 5–40)
WBC NRBC COR # BLD AUTO: 7.29 10*3/MM3 (ref 3.4–10.8)
WHOLE BLOOD HOLD COAG: NORMAL
WHOLE BLOOD HOLD COAG: NORMAL
WHOLE BLOOD HOLD SPECIMEN: NORMAL
WHOLE BLOOD HOLD SPECIMEN: NORMAL

## 2023-12-08 PROCEDURE — 99284 EMERGENCY DEPT VISIT MOD MDM: CPT

## 2023-12-08 PROCEDURE — 80053 COMPREHEN METABOLIC PANEL: CPT

## 2023-12-08 PROCEDURE — 85025 COMPLETE CBC W/AUTO DIFF WBC: CPT

## 2023-12-08 PROCEDURE — G0378 HOSPITAL OBSERVATION PER HR: HCPCS

## 2023-12-08 PROCEDURE — 93005 ELECTROCARDIOGRAM TRACING: CPT | Performed by: PHYSICIAN ASSISTANT

## 2023-12-08 PROCEDURE — 83880 ASSAY OF NATRIURETIC PEPTIDE: CPT | Performed by: PHYSICIAN ASSISTANT

## 2023-12-08 PROCEDURE — 93010 ELECTROCARDIOGRAM REPORT: CPT | Performed by: INTERNAL MEDICINE

## 2023-12-08 PROCEDURE — 80061 LIPID PANEL: CPT | Performed by: NURSE PRACTITIONER

## 2023-12-08 PROCEDURE — 84484 ASSAY OF TROPONIN QUANT: CPT | Performed by: PHYSICIAN ASSISTANT

## 2023-12-08 PROCEDURE — 93005 ELECTROCARDIOGRAM TRACING: CPT | Performed by: NURSE PRACTITIONER

## 2023-12-08 PROCEDURE — 36415 COLL VENOUS BLD VENIPUNCTURE: CPT

## 2023-12-08 PROCEDURE — 71045 X-RAY EXAM CHEST 1 VIEW: CPT

## 2023-12-08 RX ORDER — NITROGLYCERIN 0.4 MG/1
0.4 TABLET SUBLINGUAL
Status: DISCONTINUED | OUTPATIENT
Start: 2023-12-08 | End: 2023-12-09 | Stop reason: HOSPADM

## 2023-12-08 RX ORDER — ASPIRIN 81 MG/1
324 TABLET, CHEWABLE ORAL ONCE
Status: DISCONTINUED | OUTPATIENT
Start: 2023-12-08 | End: 2023-12-08 | Stop reason: SDUPTHER

## 2023-12-08 RX ORDER — NADOLOL 20 MG/1
20 TABLET ORAL DAILY
Status: DISCONTINUED | OUTPATIENT
Start: 2023-12-09 | End: 2023-12-09 | Stop reason: HOSPADM

## 2023-12-08 RX ORDER — ASPIRIN 81 MG/1
81 TABLET ORAL DAILY
Status: DISCONTINUED | OUTPATIENT
Start: 2023-12-09 | End: 2023-12-08 | Stop reason: SDUPTHER

## 2023-12-08 RX ORDER — CELECOXIB 200 MG/1
200 CAPSULE ORAL DAILY
Status: DISCONTINUED | OUTPATIENT
Start: 2023-12-09 | End: 2023-12-09 | Stop reason: HOSPADM

## 2023-12-08 RX ORDER — ASPIRIN 81 MG/1
324 TABLET, CHEWABLE ORAL ONCE
Status: COMPLETED | OUTPATIENT
Start: 2023-12-08 | End: 2023-12-08

## 2023-12-08 RX ORDER — PANTOPRAZOLE SODIUM 40 MG/1
40 TABLET, DELAYED RELEASE ORAL
Status: DISCONTINUED | OUTPATIENT
Start: 2023-12-09 | End: 2023-12-09 | Stop reason: HOSPADM

## 2023-12-08 RX ORDER — SODIUM CHLORIDE 0.9 % (FLUSH) 0.9 %
10 SYRINGE (ML) INJECTION EVERY 12 HOURS SCHEDULED
Status: DISCONTINUED | OUTPATIENT
Start: 2023-12-08 | End: 2023-12-09 | Stop reason: HOSPADM

## 2023-12-08 RX ORDER — SODIUM CHLORIDE 0.9 % (FLUSH) 0.9 %
10 SYRINGE (ML) INJECTION AS NEEDED
Status: DISCONTINUED | OUTPATIENT
Start: 2023-12-08 | End: 2023-12-09 | Stop reason: HOSPADM

## 2023-12-08 RX ORDER — ASPIRIN 81 MG/1
81 TABLET ORAL DAILY
Status: DISCONTINUED | OUTPATIENT
Start: 2023-12-08 | End: 2023-12-09 | Stop reason: HOSPADM

## 2023-12-08 RX ORDER — SODIUM CHLORIDE 9 MG/ML
40 INJECTION, SOLUTION INTRAVENOUS AS NEEDED
Status: DISCONTINUED | OUTPATIENT
Start: 2023-12-08 | End: 2023-12-09 | Stop reason: HOSPADM

## 2023-12-08 RX ADMIN — ASPIRIN 324 MG: 81 TABLET, CHEWABLE ORAL at 17:35

## 2023-12-08 RX ADMIN — Medication 10 ML: at 20:46

## 2023-12-08 NOTE — ED PROVIDER NOTES
" EMERGENCY DEPARTMENT ENCOUNTER    Room Number:  04/04  Date of encounter:  12/8/2023  PCP: Michelle Medina APRN  Historian: Patient  Full history not obtainable due to: None    HPI:  Chief Complaint: \"numbness in left arm since Monday, new onset chest pain today\"    Context: Arsenio Beard is a 43 y.o. male with a PMH significant for HTN who presents to the ED c/o numbness in left arm since Monday. Patient came into the ER today with the addition of new onset chest pain and pressure in his head and neck. Patient rates his chest pain a 3/10. The numbness is localized to the left arm but extends from shoulder to hand. All complaints are constant in nature. 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 Patient is scheduled to have surgery on his back on December 18th. Patient admits to a productive cough. Patient states it feels \"different\" when being touched on his left and right arms simultaneously, but is able to tell which side he is being touched on when eyes are closed.       MEDICAL RECORD REVIEW:    Upon review of the medical record it appears the patient was evaluated in office on 11/13/2023 for plantar fasciitis.  The patient had a normal CBC and differential on 10/25/2023 and a normal Hemoglobin A1C on this date.    PAST MEDICAL HISTORY    Active Ambulatory Problems     Diagnosis Date Noted    Precordial pain 01/26/2020    Essential hypertension 01/26/2020    Right inguinal hernia 02/17/2021    Knee mass, right 10/21/2021    Annular tear of lumbar disc 09/27/2021    Gastroesophageal reflux disease 01/14/2022     Resolved Ambulatory Problems     Diagnosis Date Noted    No Resolved Ambulatory Problems     Past Medical History:   Diagnosis Date    At risk for sleep apnea     Depression 06/1982    Difficulty swallowing     GERD (gastroesophageal reflux disease)     Hernia 02/02/2021    History of COVID-19     History " of staph infection 2008    Hypertension     Kidney cysts     Low back pain     Mass of knee     Obesity 06/2003    Right groin pain     Seasonal allergies     Visual impairment 01/1986         PAST SURGICAL HISTORY  Past Surgical History:   Procedure Laterality Date    ENDOSCOPY N/A 3/16/2022    Procedure: ESOPHAGOGASTRODUODENOSCOPY WITH BIOPSIES;  Surgeon: Chandu Eric MD;  Location: [facility] ENDOSCOPY;  Service: Gastroenterology;  Laterality: N/A;  PRE OP - DYSPHAGIA  POST OP - GASTRITIS    EXCISION MASS LEG Right 10/27/2021    Procedure: MASS SOFT TISSUE EXCISION rIGHT KNEE MASS;  Surgeon: Abdulkadir Ham MD;  Location: [facility] MAIN OR;  Service: Orthopedics;  Laterality: Right;    FACIAL RECONSTRUCTION SURGERY      as a child    INGUINAL HERNIA REPAIR Right 2/23/2021    Procedure: right INGUINAL HERNIA REPAIR LAPAROSCOPIC WITH DAVINCI ROBOT;  Surgeon: Hiram Ocampo MD;  Location: [facility] OR;  Service: DaVinc;  Laterality: Right;    INGUINAL HERNIA REPAIR  02/23/2021         FAMILY HISTORY  Family History   Adopted: Yes   Problem Relation Age of Onset    Heart disease Maternal Grandmother     Heart attack Maternal Grandmother     Malig Hyperthermia Neg Hx          SOCIAL HISTORY  Social History     Socioeconomic History    Marital status: Single   Tobacco Use    Smoking status: Never    Smokeless tobacco: Never   Vaping Use    Vaping Use: Never used   Substance and Sexual Activity    Alcohol use: Never    Drug use: Never     Comment: STEROID USE X3 YEARS/[id number]    Sexual activity: Not Currently     Partners: Female     Birth control/protection: None     Comment: Am a [employer]. Abstained from sexual intimacy since 2006         ALLERGIES  Patient has no known allergies.        REVIEW OF SYSTEMS    All systems reviewed and marked as negative except as listed in HPI     PHYSICAL EXAM    I have reviewed the triage vital signs and nursing notes.    ED Triage Vitals   Temp Heart Rate Resp BP SpO2    12/08/23 1444 12/08/23 1444 12/08/23 1444 12/08/23 1447 12/08/23 1444   98.1 °F (36.7 °C) 60 16 128/88 97 %      Temp src Heart Rate Source Patient Position BP Location FiO2 (%)   12/08/23 1444 12/08/23 1444 -- -- --   Tympanic Monitor          Physical Exam  Constitutional:       General: He is not in acute distress.     Appearance: He is well-developed.   HENT:      Head: Normocephalic and atraumatic.   Eyes:      General: No scleral icterus.     Conjunctiva/sclera: Conjunctivae normal.   Neck:      Trachea: No tracheal deviation.   Cardiovascular:      Rate and Rhythm: Normal rate and regular rhythm.      Pulses:           Radial pulses are 2+ on the right side and 2+ on the left side.   Pulmonary:      Effort: Pulmonary effort is normal.      Breath sounds: Normal breath sounds.   Abdominal:      Palpations: Abdomen is soft.      Tenderness: There is no abdominal tenderness. There is no guarding.   Musculoskeletal:         General: No deformity.      Cervical back: Normal range of motion.   Lymphadenopathy:      Cervical: No cervical adenopathy.   Skin:     General: Skin is warm and dry.   Neurological:      Mental Status: He is alert and oriented to person, place, and time.      Sensory: Sensory deficit present.      Comments: Mild decrease of sensation to the left arm when compared to the right.   strength intact.   Psychiatric:         Behavior: Behavior normal.         Vital signs and nursing notes reviewed.            LAB RESULTS  Recent Results (from the past 24 hour(s))   Comprehensive Metabolic Panel    Collection Time: 12/08/23  3:03 PM    Specimen: Arm, Left; Blood   Result Value Ref Range    Glucose 96 65 - 99 mg/dL    BUN 15 6 - 20 mg/dL    Creatinine 1.18 0.76 - 1.27 mg/dL    Sodium 144 136 - 145 mmol/L    Potassium 4.2 3.5 - 5.2 mmol/L    Chloride 106 98 - 107 mmol/L    CO2 24.6 22.0 - 29.0 mmol/L    Calcium 9.2 8.6 - 10.5 mg/dL    Total Protein 7.1 6.0 - 8.5 g/dL    Albumin 4.5 3.5 - 5.2  g/dL    ALT (SGPT) 27 1 - 41 U/L    AST (SGOT) 17 1 - 40 U/L    Alkaline Phosphatase 42 39 - 117 U/L    Total Bilirubin 0.6 0.0 - 1.2 mg/dL    Globulin 2.6 gm/dL    A/G Ratio 1.7 g/dL    BUN/Creatinine Ratio 12.7 7.0 - 25.0    Anion Gap 13.4 5.0 - 15.0 mmol/L    eGFR 77.5 >60.0 mL/min/1.73   Green Top (Gel)    Collection Time: 12/08/23  3:03 PM   Result Value Ref Range    Extra Tube Hold for add-ons.    Lavender Top    Collection Time: 12/08/23  3:03 PM   Result Value Ref Range    Extra Tube hold for add-on    Gold Top - SST    Collection Time: 12/08/23  3:03 PM   Result Value Ref Range    Extra Tube Hold for add-ons.    Light Blue Top    Collection Time: 12/08/23  3:03 PM   Result Value Ref Range    Extra Tube Hold for add-ons.    CBC Auto Differential    Collection Time: 12/08/23  3:03 PM    Specimen: Arm, Left; Blood   Result Value Ref Range    WBC 7.29 3.40 - 10.80 10*3/mm3    RBC 4.67 4.14 - 5.80 10*6/mm3    Hemoglobin 14.2 13.0 - 17.7 g/dL    Hematocrit 41.1 37.5 - 51.0 %    MCV 88.0 79.0 - 97.0 fL    MCH 30.4 26.6 - 33.0 pg    MCHC 34.5 31.5 - 35.7 g/dL    RDW 12.9 12.3 - 15.4 %    RDW-SD 41.5 37.0 - 54.0 fl    MPV 10.1 6.0 - 12.0 fL    Platelets 226 140 - 450 10*3/mm3    Neutrophil % 51.9 42.7 - 76.0 %    Lymphocyte % 35.9 19.6 - 45.3 %    Monocyte % 8.5 5.0 - 12.0 %    Eosinophil % 2.9 0.3 - 6.2 %    Basophil % 0.4 0.0 - 1.5 %    Immature Grans % 0.4 0.0 - 0.5 %    Neutrophils, Absolute 3.78 1.70 - 7.00 10*3/mm3    Lymphocytes, Absolute 2.62 0.70 - 3.10 10*3/mm3    Monocytes, Absolute 0.62 0.10 - 0.90 10*3/mm3    Eosinophils, Absolute 0.21 0.00 - 0.40 10*3/mm3    Basophils, Absolute 0.03 0.00 - 0.20 10*3/mm3    Immature Grans, Absolute 0.03 0.00 - 0.05 10*3/mm3    nRBC 0.0 0.0 - 0.2 /100 WBC   High Sensitivity Troponin T    Collection Time: 12/08/23  3:03 PM    Specimen: Arm, Left; Blood   Result Value Ref Range    HS Troponin T <6 <22 ng/L   BNP    Collection Time: 12/08/23  3:03 PM    Specimen: Arm, Left;  Blood   Result Value Ref Range    proBNP <36.0 0.0 - 450.0 pg/mL   ECG 12 Lead Chest Pain    Collection Time: 12/08/23  4:54 PM   Result Value Ref Range    QT Interval 412 ms    QTC Interval 398 ms       Ordered the above labs and independently reviewed the results.        RADIOLOGY  XR Chest 1 View    Result Date: 12/8/2023  CHEST SINGLE VIEW  HISTORY: Chest pain.  COMPARISON: Two-view chest 10/26/2021.  FINDINGS: Cardiomediastinal silhouette is within normal limits. Lungs are clear. There is no evidence for pulmonary edema or pleural effusion or infiltrate.      No evidence for active disease in the chest.       I ordered the above noted radiological studies. Independently reviewed by me and discussed with radiologist.  See dictation above for official radiology interpretation.      PROCEDURES    Procedures        MEDICATIONS GIVEN IN ER    Medications   sodium chloride 0.9 % flush 10 mL (has no administration in time range)   nitroglycerin (NITROSTAT) SL tablet 0.4 mg (has no administration in time range)   sodium chloride 0.9 % flush 10 mL (has no administration in time range)   sodium chloride 0.9 % flush 10 mL (has no administration in time range)   sodium chloride 0.9 % infusion 40 mL (has no administration in time range)   aspirin chewable tablet 324 mg (has no administration in time range)     And   aspirin EC tablet 81 mg (has no administration in time range)   aspirin chewable tablet 324 mg (324 mg Oral Given 12/8/23 1735)         PROGRESS, DATA ANALYSIS, CONSULTS, AND MEDICAL DECISION MAKING    All labs have been independently interpreted by me.  All radiology studies have been interpreted by me.  Discussion below represents my analysis of pertinent findings related to patient's condition, differential diagnosis, treatment plan and final disposition.    Patient presentation and evaluation consistent with atypical sounding chest pain and some decreased sensation intermittently to the left arm.  With no  recent cardiac evaluation and a heart score 4 I feel he is most appropriately admitted to the hospital for observation and further cardiac workup.  Patient agreeable.    - Chronic or social conditions impacting care: None      DIFFERENTIAL DIAGNOSIS INCLUDE BUT NOT LIMITED TO:     Differential diagnosis includes but is not limited to:  -acute coronary syndrome  -pulmonary embolism  -thoracic aortic dissection  -pneumonia  -pneumothorax  -musculoskeletal pain  -GERD  -esophageal spasm  -anxiety  -myocarditis/pericarditis  -esophageal rupture  -pancreatitis.           Orders placed during this visit:  Orders Placed This Encounter   Procedures    XR Chest 1 View    [place] Draw    Comprehensive Metabolic Panel    CBC Auto Differential    [place] Draw    High Sensitivity Troponin T    BNP    High Sensitivity Troponin T 2Hr    Basic Metabolic Panel    CBC (No Diff)    High Sensitivity Troponin T    Lipid Panel    Magnesium    NPO Diet NPO Type: Strict NPO    Diet: Regular/House Diet; Texture: Regular Texture (IDDSI 7); Fluid Consistency: Thin (IDDSI 0)    Vital Signs    Vital Signs Every 15 Minutes Until Stable, Then Every 4 Hours    Telemetry - Place Orders & Notify Provider of Results When Patient Experiences Acute Chest Pain, Dysrhythmia or Respiratory Distress    May Be Off Telemetry for Tests    Pulse Oximetry, Continuous    Notify Physician For Unrelieved Chest Pain    Intake & Output    Weigh Patient    Nurse to Order Troponin As Needed For Unrelieved or New Chest Pain    Saline Lock & Maintain IV Access    Inpatient Cardiology Consult    Oxygen Therapy- Nasal Cannula; Titrate 1-6 LPM Per SpO2; 90 - 95%    ECG 12 Lead Chest Pain    ECG 12 Lead Chest Pain    ECG 12 Lead Chest Pain    ECG 12 Lead Chest Pain    ECG 12 Lead Chest Pain    Adult Transthoracic Echo Complete W/ Cont if Necessary Per Protocol    Insert Peripheral IV    Insert Peripheral IV    Initiate ED Observation Status    CBC & Differential    Green  Top (Gel)    Lavender Top    Gold Top - SST    Light Blue Top    Green Top (Gel)    Lavender Top    Gold Top - SST    Light Blue Top              AS OF 18:15 EST VITALS:    BP - 131/97  HR - 60  TEMP - 98.1 °F (36.7 °C) (Tympanic)  02 SATS - 97%          1815 I rechecked the patient.  I discussed the patient's labs, radiology findings (including all incidental findings), diagnosis, and plan for admission. The patient understands and agrees with the plan.      DIAGNOSIS  Final diagnoses:   Chest pain, unspecified type         DISPOSITION  Admit obs    Pt masked in first look. I wore a surgical mask throughout my encounters with the pt. I performed hand hygiene on entry into the pt room and upon exit.     Dictated utilizing Dragon dictation     Note Disclaimer: At [facility], we believe that sharing information builds trust and better relationships. You are receiving this note because you recently visited [facility]. It is possible you will see health information before a provider has talked with you about it. This kind of information can be easy to misunderstand. To help you fully understand what it means for your health, we urge you to discuss this note with your provider.      Gonzalo Quintanilla PA  12/08/23 1811

## 2023-12-08 NOTE — ED NOTES
Pt to er via pv with c/o left arm numbness since Monday. Pt denies any known injury. Pt denies pain.

## 2023-12-08 NOTE — ED NOTES
Patient states he feels like has pressure from the neck up, he states it is mild. He states he feels hot.

## 2023-12-08 NOTE — TELEPHONE ENCOUNTER
Caller: Arsenio Beard    Relationship to patient: Self    Best call back number: [phone]     Chief complaint:  LEFT ARM NUMBNESS    Patient directed to call 911 or go to their nearest emergency room.     Patient verbalized understanding: [x] Yes  [] No  If no, why?    Additional notes:   PATIENT STATED THAT HE IS GOING TO THE HOSPITAL. PATIENT WILL FOLLOW UP WITH US AFTERWARDS.

## 2023-12-08 NOTE — TELEPHONE ENCOUNTER
Received message from the hub that it was recommended that he go to the ER for left arm numbness. Please call him and make sure he has an ER follow up

## 2023-12-09 ENCOUNTER — APPOINTMENT (OUTPATIENT)
Dept: CT IMAGING | Facility: HOSPITAL | Age: 43
End: 2023-12-09
Payer: COMMERCIAL

## 2023-12-09 ENCOUNTER — APPOINTMENT (OUTPATIENT)
Dept: CARDIOLOGY | Facility: HOSPITAL | Age: 43
End: 2023-12-09
Payer: COMMERCIAL

## 2023-12-09 ENCOUNTER — READMISSION MANAGEMENT (OUTPATIENT)
Dept: CALL CENTER | Facility: HOSPITAL | Age: 43
End: 2023-12-09
Payer: COMMERCIAL

## 2023-12-09 VITALS
DIASTOLIC BLOOD PRESSURE: 83 MMHG | SYSTOLIC BLOOD PRESSURE: 133 MMHG | OXYGEN SATURATION: 97 % | HEIGHT: 71 IN | HEART RATE: 62 BPM | BODY MASS INDEX: 31.5 KG/M2 | TEMPERATURE: 97.6 F | WEIGHT: 225 LBS | RESPIRATION RATE: 17 BRPM

## 2023-12-09 LAB
ANION GAP SERPL CALCULATED.3IONS-SCNC: 9.2 MMOL/L (ref 5–15)
AORTIC DIMENSIONLESS INDEX: 0.8 (DI)
BH CV ECHO MEAS - AO MAX PG: 6 MMHG
BH CV ECHO MEAS - AO MEAN PG: 2.9 MMHG
BH CV ECHO MEAS - AO V2 MAX: 122.5 CM/SEC
BH CV ECHO MEAS - AO V2 VTI: 26.7 CM
BH CV ECHO MEAS - AVA(I,D): 3 CM2
BH CV ECHO MEAS - EDV(CUBED): 122.3 ML
BH CV ECHO MEAS - EDV(MOD-SP2): 132 ML
BH CV ECHO MEAS - EDV(MOD-SP4): 121 ML
BH CV ECHO MEAS - EF(MOD-BP): 54.8 %
BH CV ECHO MEAS - EF(MOD-SP2): 54.5 %
BH CV ECHO MEAS - EF(MOD-SP4): 60.3 %
BH CV ECHO MEAS - ESV(CUBED): 28.2 ML
BH CV ECHO MEAS - ESV(MOD-SP2): 60 ML
BH CV ECHO MEAS - ESV(MOD-SP4): 48 ML
BH CV ECHO MEAS - FS: 38.7 %
BH CV ECHO MEAS - IVS/LVPW: 0.96 CM
BH CV ECHO MEAS - IVSD: 0.9 CM
BH CV ECHO MEAS - LAT PEAK E' VEL: 10.1 CM/SEC
BH CV ECHO MEAS - LV DIASTOLIC VOL/BSA (35-75): 54.6 CM2
BH CV ECHO MEAS - LV MASS(C)D: 161.8 GRAMS
BH CV ECHO MEAS - LV MAX PG: 3.7 MMHG
BH CV ECHO MEAS - LV MEAN PG: 1.68 MMHG
BH CV ECHO MEAS - LV SYSTOLIC VOL/BSA (12-30): 21.6 CM2
BH CV ECHO MEAS - LV V1 MAX: 96.5 CM/SEC
BH CV ECHO MEAS - LV V1 VTI: 21.5 CM
BH CV ECHO MEAS - LVIDD: 5 CM
BH CV ECHO MEAS - LVIDS: 3 CM
BH CV ECHO MEAS - LVOT AREA: 3.7 CM2
BH CV ECHO MEAS - LVOT DIAM: 2.17 CM
BH CV ECHO MEAS - LVPWD: 0.94 CM
BH CV ECHO MEAS - MED PEAK E' VEL: 7.9 CM/SEC
BH CV ECHO MEAS - MV A DUR: 0.18 SEC
BH CV ECHO MEAS - MV A MAX VEL: 83.4 CM/SEC
BH CV ECHO MEAS - MV DEC SLOPE: 424 CM/SEC2
BH CV ECHO MEAS - MV DEC TIME: 0.19 SEC
BH CV ECHO MEAS - MV E MAX VEL: 90.2 CM/SEC
BH CV ECHO MEAS - MV E/A: 1.08
BH CV ECHO MEAS - MV MAX PG: 3.8 MMHG
BH CV ECHO MEAS - MV MEAN PG: 1.5 MMHG
BH CV ECHO MEAS - MV P1/2T: 68.3 MSEC
BH CV ECHO MEAS - MV V2 VTI: 30 CM
BH CV ECHO MEAS - MVA(P1/2T): 3.2 CM2
BH CV ECHO MEAS - MVA(VTI): 2.6 CM2
BH CV ECHO MEAS - PA ACC TIME: 0.12 SEC
BH CV ECHO MEAS - PA V2 MAX: 88.1 CM/SEC
BH CV ECHO MEAS - PI END-D VEL: 67.6 CM/SEC
BH CV ECHO MEAS - PULM A REVS DUR: 0.16 SEC
BH CV ECHO MEAS - PULM A REVS VEL: 35.4 CM/SEC
BH CV ECHO MEAS - PULM DIAS VEL: 51.9 CM/SEC
BH CV ECHO MEAS - PULM S/D: 1.13
BH CV ECHO MEAS - PULM SYS VEL: 58.7 CM/SEC
BH CV ECHO MEAS - QP/QS: 0.89
BH CV ECHO MEAS - RAP SYSTOLE: 3 MMHG
BH CV ECHO MEAS - RV MAX PG: 1.76 MMHG
BH CV ECHO MEAS - RV V1 MAX: 66.4 CM/SEC
BH CV ECHO MEAS - RV V1 VTI: 16 CM
BH CV ECHO MEAS - RVOT DIAM: 2.37 CM
BH CV ECHO MEAS - RVSP: 22.6 MMHG
BH CV ECHO MEAS - SI(MOD-SP2): 32.5 ML/M2
BH CV ECHO MEAS - SI(MOD-SP4): 32.9 ML/M2
BH CV ECHO MEAS - SV(LVOT): 79.2 ML
BH CV ECHO MEAS - SV(MOD-SP2): 72 ML
BH CV ECHO MEAS - SV(MOD-SP4): 73 ML
BH CV ECHO MEAS - SV(RVOT): 70.7 ML
BH CV ECHO MEAS - TAPSE (>1.6): 2.5 CM
BH CV ECHO MEAS - TR MAX PG: 19.6 MMHG
BH CV ECHO MEAS - TR MAX VEL: 221.5 CM/SEC
BH CV ECHO MEASUREMENTS AVERAGE E/E' RATIO: 10.02
BH CV XLRA - RV BASE: 4 CM
BH CV XLRA - RV LENGTH: 8.2 CM
BH CV XLRA - RV MID: 2.8 CM
BH CV XLRA - TDI S': 14.4 CM/SEC
BUN SERPL-MCNC: 14 MG/DL (ref 6–20)
BUN/CREAT SERPL: 13.9 (ref 7–25)
CALCIUM SPEC-SCNC: 8.2 MG/DL (ref 8.6–10.5)
CHLORIDE SERPL-SCNC: 109 MMOL/L (ref 98–107)
CO2 SERPL-SCNC: 24.8 MMOL/L (ref 22–29)
CREAT SERPL-MCNC: 1.01 MG/DL (ref 0.76–1.27)
DEPRECATED RDW RBC AUTO: 41.4 FL (ref 37–54)
EGFRCR SERPLBLD CKD-EPI 2021: 93.5 ML/MIN/1.73
ERYTHROCYTE [DISTWIDTH] IN BLOOD BY AUTOMATED COUNT: 12.8 % (ref 12.3–15.4)
GLUCOSE SERPL-MCNC: 138 MG/DL (ref 65–99)
HCT VFR BLD AUTO: 39 % (ref 37.5–51)
HGB BLD-MCNC: 13.5 G/DL (ref 13–17.7)
LEFT ATRIUM VOLUME INDEX: 32.5 ML/M2
LEFT ATRIUM VOLUME: 72 ML
MAGNESIUM SERPL-MCNC: 2 MG/DL (ref 1.6–2.6)
MCH RBC QN AUTO: 30.5 PG (ref 26.6–33)
MCHC RBC AUTO-ENTMCNC: 34.6 G/DL (ref 31.5–35.7)
MCV RBC AUTO: 88.2 FL (ref 79–97)
PLATELET # BLD AUTO: 178 10*3/MM3 (ref 140–450)
PMV BLD AUTO: 10.2 FL (ref 6–12)
POTASSIUM SERPL-SCNC: 3.4 MMOL/L (ref 3.5–5.2)
RBC # BLD AUTO: 4.42 10*6/MM3 (ref 4.14–5.8)
SINUS: 3.6 CM
SODIUM SERPL-SCNC: 143 MMOL/L (ref 136–145)
TROPONIN T SERPL HS-MCNC: <6 NG/L
WBC NRBC COR # BLD AUTO: 6.43 10*3/MM3 (ref 3.4–10.8)

## 2023-12-09 PROCEDURE — G0378 HOSPITAL OBSERVATION PER HR: HCPCS

## 2023-12-09 PROCEDURE — 93306 TTE W/DOPPLER COMPLETE: CPT | Performed by: INTERNAL MEDICINE

## 2023-12-09 PROCEDURE — 85027 COMPLETE CBC AUTOMATED: CPT | Performed by: NURSE PRACTITIONER

## 2023-12-09 PROCEDURE — 99244 OFF/OP CNSLTJ NEW/EST MOD 40: CPT | Performed by: INTERNAL MEDICINE

## 2023-12-09 PROCEDURE — 93010 ELECTROCARDIOGRAM REPORT: CPT | Performed by: STUDENT IN AN ORGANIZED HEALTH CARE EDUCATION/TRAINING PROGRAM

## 2023-12-09 PROCEDURE — 93306 TTE W/DOPPLER COMPLETE: CPT

## 2023-12-09 PROCEDURE — 84484 ASSAY OF TROPONIN QUANT: CPT | Performed by: NURSE PRACTITIONER

## 2023-12-09 PROCEDURE — 80048 BASIC METABOLIC PNL TOTAL CA: CPT | Performed by: NURSE PRACTITIONER

## 2023-12-09 PROCEDURE — 83735 ASSAY OF MAGNESIUM: CPT | Performed by: NURSE PRACTITIONER

## 2023-12-09 PROCEDURE — 72125 CT NECK SPINE W/O DYE: CPT

## 2023-12-09 RX ORDER — POTASSIUM CHLORIDE 750 MG/1
40 TABLET, FILM COATED, EXTENDED RELEASE ORAL ONCE
Status: COMPLETED | OUTPATIENT
Start: 2023-12-09 | End: 2023-12-09

## 2023-12-09 RX ADMIN — CELECOXIB 200 MG: 200 CAPSULE ORAL at 08:46

## 2023-12-09 RX ADMIN — PANTOPRAZOLE SODIUM 40 MG: 40 TABLET, DELAYED RELEASE ORAL at 06:44

## 2023-12-09 RX ADMIN — NADOLOL 20 MG: 20 TABLET ORAL at 10:56

## 2023-12-09 RX ADMIN — ASPIRIN 81 MG: 81 TABLET, COATED ORAL at 08:46

## 2023-12-09 RX ADMIN — POTASSIUM CHLORIDE 40 MEQ: 750 TABLET, EXTENDED RELEASE ORAL at 06:43

## 2023-12-09 RX ADMIN — Medication 10 ML: at 08:46

## 2023-12-09 NOTE — DISCHARGE INSTRUCTIONS
Follow up with your primary care provider in 1-2 weeks.  You have been referred to neurology for further workup of left arm paresthesia.  Return to the emergency department with worsening symptoms, uncontrolled pain, inability to tolerate oral liquids, fever greater than 101°F not controlled by Tylenol or as needed with emergent concerns.

## 2023-12-09 NOTE — OUTREACH NOTE
Prep Survey      Flowsheet Row Responses   [facility] facility patient discharged from? [place]   Is LACE score < 7 ? Yes   Eligibility [facility]   Date of Admission 12/08/23   Date of Discharge 12/09/23   Discharge Disposition Home or Self Care   Discharge diagnosis Chest pain   Does the patient have one of the following disease processes/diagnoses(primary or secondary)? Other   Does the patient have Home health ordered? No   Is there a DME ordered? No   Prep survey completed? Yes            Nevin MICHAELS - Registered Nurse

## 2023-12-09 NOTE — PROGRESS NOTES
Patient Care Team:  Michelle Medina APRN as PCP - General (Family Medicine)     NAOMI BATES attestation Note    I supervised care provided by the midlevel provider.    The JOSELINE and I have discussed this patient's history, physical exam, and treatment plan. I have reviewed the documentation and personally had a face to face interaction with the patient  I affirm the documentation and agree with the treatment and plan. I provided a substantive portion of the care of this patient.  I personally performed the physical exam, in its entirety.  My personal findings are documented in below:    History:  43-year-old male with history of hypertension presents with chest pain as well as left arm numbness.  Patient complains of numbness in the left arm since Tuesday, extends from the shoulder to the hand, denies any weakness.  Patient does endorse some burning pain in his left medial upper arm but denies any radicular pain, no neck pain.  Patient denies any other numbness, no facial droop, no difficulty with speech.  Patient denies any fever or systemic symptoms, no rash.  Patient is also complaining of chest pain, pain does not radiate, no associated shortness of breath nausea vomiting or diaphoresis.  EKG nonischemic, troponin negative, chest x-ray unremarkable.  ED workup unremarkable.    Physical Exam:  General: No acute distress.  HENT: NCAT, PERRL, Nares patent.  Eyes: no scleral icterus.  Neck: trachea midline, no ROM limitations.  Cervical spine: No step-offs or deformities, no midline tenderness to palpation.   Bilateral upper extremities: 5 out of 5 strength at shoulder flexion/extension, shoulder abduction/abduction, elbow flexion/extension, wrist flexion/extension,  strength.  Patient reports slightly decreased sensation in the left upper extremity involving the entire extremity from the shoulder down to the hand and all 5 digits although sensation intact to light touch throughout.  CV: regular rhythm, regular  rate.  Respiratory: normal effort, CTAB.  Abdomen: soft, nondistended, NTTP, no rebound tenderness, no guarding or rigidity.  Musculoskeletal: no deformity.  Neuro: alert, moves all extremities, follows commands.  Skin: warm, dry.  No rash.    Assessment and Plan:  Patient admitted to observation center, cardiology consulted, trend troponin, monitoring, n.p.o. after midnight.  Patient has no strokelike symptoms, numbness is somewhat strange as does not fit with a area of the brain or spinal cord or peripheral nerve distribution.  Patient has no weakness, no radicular pain, reassuring neuroexam.  Obtaining CT imaging of cervical spine.  If CT imaging is unremarkable, patient can be referred to neurosurgery and neurology for further evaluation as an outpatient.

## 2023-12-09 NOTE — PLAN OF CARE
Goal Outcome Evaluation:  Plan of Care Reviewed With: patient        Progress: improving  Outcome Evaluation: Patient is a 43 year old male admitted for chest pain. Troponin is <6. No compliants of chest pain. A&Ox4, room air, ambulates to the bathroom independently. VSS, NPO, Cardiology consulted. NSR. Patient had 2.12 sec cardiac pause. Notified Deena LING.

## 2023-12-09 NOTE — DISCHARGE SUMMARY
ED OBSERVATION PROGRESS/DISCHARGE SUMMARY    Date of Admission: 12/8/2023   LOS: 0 days   PCP: Michelle Medina, ANURADHA    Subjective patient reports continued left arm numbness and central chest discomfort.  Describes the chest discomfort as sharp but mild, describes it as 2 out of 10.  Also complaining of left arm numbness, denies pain or weakness.  States he can still since heat and cold, states like it feels like his arm is asleep but denies pins-and-needles.    Hospital Outcome: 43-year-old male admitted to the observation unit for further evaluation of chest pain.  He was ruled out for ACS with serial troponins that have remained negative.  EKG this morning shows sinus rhythm with rate of 60 bpm.  Patient was seen by cardiology, Dr. Castro, he does not feel patient's pain is cardiac in nature.  Patient had echocardiogram which shows EF 54.8%, mild tricuspid valve regurgitation, otherwise unremarkable.  No further cardiac testing warranted this admission.    Patient complained of numbness in his left arm so CT of C-spine was obtained.  This shows multilevel degenerative disc disease that results in mild left-sided neuroforaminal narrowing at C2-C3, mild bilateral neuroforaminal narrowing at C3-C4, and moderate neuroforaminal narrowing on the right at C7-T1.  Patient's symptoms do not follow peripheral nerve distribution, I do not feel these C-spine findings explain his symptoms.  Patient not having any other strokelike symptoms, not having any weakness in the left arm, I believe MRI brain would be low yield at this point.  I think the next best step in management would be nerve conduction study, I will place ambulatory referral to neurology.  Patient also reports chronic low back pain from workplace injury that occurred in 2021.  Patient has ablation of lumbar spine scheduled on December 18, encouraged him to discuss the symptoms with his doctor, states he follows with Dr. Loera for back issues.  Usual return to  ER precautions advised, patient expresses understanding and is in agreement with plan.    ROS:  General: no fevers, chills  Respiratory: no cough, dyspnea  Cardiovascular: no chest pain, palpitations  Abdomen: No abdominal pain, nausea, vomiting, or diarrhea  Neurologic: No focal weakness    Objective   Physical Exam:  I have reviewed the vital signs.  Temp:  [97.2 °F (36.2 °C)-98.1 °F (36.7 °C)] 97.2 °F (36.2 °C)  Heart Rate:  [56-62] 58  Resp:  [16] 16  BP: (113-169)/() 123/76  General Appearance:    Alert, cooperative, no distress  Head:    Normocephalic, atraumatic  Eyes:    Sclerae anicteric  Neck:   Supple, no mass  Lungs: Clear to auscultation bilaterally, respirations unlabored  Heart: Regular rate and rhythm, S1 and S2 normal, no murmur, rub or gallop  Abdomen:  Soft, nontender, bowel sounds active, nondistended  Extremities: No clubbing, cyanosis, or edema to lower extremities  Pulses:  2+ and symmetric in distal lower extremities  Skin: No rashes   Neurologic: Oriented x3, Normal strength to extremities    Results Review:    I have reviewed the labs, radiology results and diagnostic studies.    Results from last 7 days   Lab Units 12/09/23  0455   WBC 10*3/mm3 6.43   HEMOGLOBIN g/dL 13.5   HEMATOCRIT % 39.0   PLATELETS 10*3/mm3 178     Results from last 7 days   Lab Units 12/09/23  0455 12/08/23  1503   SODIUM mmol/L 143 144   POTASSIUM mmol/L 3.4* 4.2   CHLORIDE mmol/L 109* 106   CO2 mmol/L 24.8 24.6   BUN mg/dL 14 15   CREATININE mg/dL 1.01 1.18   CALCIUM mg/dL 8.2* 9.2   BILIRUBIN mg/dL  --  0.6   ALK PHOS U/L  --  42   ALT (SGPT) U/L  --  27   AST (SGOT) U/L  --  17   GLUCOSE mg/dL 138* 96     Imaging Results (Last 24 Hours)       Procedure Component Value Units Date/Time    CT Cervical Spine Without Contrast [[id number]] Collected: 12/09/23 0057     Updated: 12/09/23 0105    Narrative:      CT OF THE CERVICAL SPINE     HISTORY: Numbness     COMPARISON: None available.     TECHNIQUE: Axial CT  imaging was obtained through the cervical spine.  Coronal and sagittal reformatted images were obtained.     FINDINGS:  No acute fracture or subluxation of the cervical spine is identified.  Cervical vertebral body alignment appears within normal limits. There is  some streak artifact obscuring the cervicothoracic junction. There is no  prevertebral soft tissue swelling. There does appear to be some  intervertebral disc space narrowing at C7-T1. Images through the lung  apices do not demonstrate any acute abnormalities. Thyroid gland and  trachea appear unremarkable.     C2-C3: There is no canal stenosis. There is some mild neuroforaminal  narrowing on the left.  C3-C4: There is no significant canal stenosis. There is some mild  bilateral neural foraminal narrowing, more significant on the left.  C4-C5: There is no significant canal stenosis or neuroforaminal  narrowing.  C5-C6: There is no significant canal stenosis. There is no significant  neuroforaminal narrowing.  C6-C7: There is no significant canal stenosis or neuroforaminal  narrowing.  C7-T1: There is no significant canal stenosis. There does appear to be  moderate neuroforaminal narrowing on the right.       Impression:      Degenerative changes, as noted above.     Radiation dose reduction techniques were utilized, including automated  exposure control and exposure modulation based on body size.        This report was finalized on 12/9/2023 1:02 AM by Dr. Ena Martinez M.D on Workstation: [id number]       XR Chest 1 View [[id number]] Collected: 12/08/23 1713     Updated: 12/08/23 1840    Narrative:      CHEST SINGLE VIEW     HISTORY: Chest pain.     COMPARISON: Two-view chest 10/26/2021.     FINDINGS: Cardiomediastinal silhouette is within normal limits. Lungs  are clear. There is no evidence for pulmonary edema or pleural effusion  or infiltrate.       Impression:      No evidence for active disease in the chest.     This report was finalized on  12/8/2023 6:37 PM by Dr. Elie Schwartz M.D on Workstation: [id number]                 I have reviewed the medications.  ---------------------------------------------------------------------------------------------  Assessment & Plan   Assessment/Problem List    Chest pain      Plan:    Chest pain   -Cardiac monitoring  -High sensitivity troponin <6 X3  -Vital signs every 4 hours   -Cardiology consult, Dr. Castro  -High-sensitivity troponin  -EKG-sinus rhythm rate 56, no acute injury pattern  -N.p.o. after midnight   -No further cardiac testing warranted this admission     Left arm paresthesia  -CT cervical spine reviewed, patient has some degenerative disc disease resulting in mild neuroforaminal narrowing   -Ambulatory referral to neurology  -Follow-up with Dr. Loera    Disposition: Home    Follow-up after Discharge: PCP    30 minutes has been spent by [facility] providers in the care of this patient while under observation status     This note will serve as discharge summary    MORENA Campbell 12/09/23 11:00 EST    I have worn appropriate PPE during this patient encounter and sanitized my hands with entering and exiting patient room.

## 2023-12-09 NOTE — CONSULTS
Cardiology History & Physical / Consultation      Patient Name: Arsenio Beard  Age/Sex: 43 y.o. male  : 1980  MRN: [id number]    Date of Admission: 2023  Date of Encounter Visit: 23  Encounter Provider: Mode Castro MD  Referring Provider: Gonzalo Botello MD  Place of Service: [facility] CARDIOLOGY  Patient Care Team:  Michelle Medina APRN as PCP - General (Family Medicine)          Subjective:     Chief Complaint: chest pain, numbness in left arm     Reason for consultation: chest pain     History of Present Illness:  Arsenio Beard is a 43 y.o. male with a history of hypertension, GERD, and lower back pain.     He saw Dr. Chin in 2020 after presenting to the ER with midsternal chest discomfort that radiated to his neck with associated right arm numbness. He was hypertensive. The pain started after lifting heavy objects. His D-dimer was negative.  It was not felt his pain is cardiac in nature more likely GI in onset and was given Carafate and omeprazole.  He was subsequently sent home and saw Dr. Phillips and felt to have migraine headaches for which he was given steroids as well as Corgard. He also c/o chest pain which is felt to be atypical and subsequent for further evaluation.  He stated that the chest pain had improved and was only intermittently worse with activity.  His right arm pain and paresthesias improved slightly. Plan was made to control his BP and come back for a stress test.     He presented to the ER on  complaining of numbness in his left arm for 4 days and new onset chest pain and pressure in his neck and head. He rated the pain as a 3/10. All complaints are constant. He is scheduled to undergo a back surgery on 23 for an injury that occurred in . Denies any nausea, vomiting, swelling of his feet and ankles, or shortness of breath. Blood work was unremarkable. Negative HS troponin. CXR negative. EKG SR  rate 56. A CT cervical spine has been ordered for the left arm numbness.     I have been asked to see this patient for chest pain.   Patient says he has numbness in his left arm.  He feels like his arms asleep except for he does not have the pins-and-needles type sensation.  He is also been having this discomfort in the substernal region of his chest since noon yesterday.  He still has not currently.    ECHO 12/9/2023       Left ventricular systolic function is normal. Calculated left ventricular EF = 54.8%    Left ventricular diastolic function was normal.    Mild tricuspid valve regurgitation is present.    Estimated right ventricular systolic pressure from tricuspid regurgitation is normal (<35 mmHg). Calculated right ventricular systolic pressure from tricuspid regurgitation is 23 mmHg.     Stress Test 1/29/2020  The Duke Treadmill Score of 13.5 is consistent with a Low risk for ischemic heart disease.  No ECG evidence of myocardial ischemia. Negative clinical evidence of myocardial ischemia.    Past Medical History:  Past Medical History:   Diagnosis Date    At risk for sleep apnea     SLEEP STUDY WAS NEGATIVE    Depression 06/1982    Difficulty swallowing     GERD (gastroesophageal reflux disease)     Hernia 02/02/2021    Repaired on 02/23/2021    History of COVID-19     11/2020    History of staph infection 2008    ON BACK    Hypertension     Kidney cysts     Low back pain     Mass of knee     RIGHT    Obesity 06/2003    Right groin pain     Seasonal allergies     Visual impairment 01/1986       Past Surgical History:   Procedure Laterality Date    ENDOSCOPY N/A 3/16/2022    Procedure: ESOPHAGOGASTRODUODENOSCOPY WITH BIOPSIES;  Surgeon: Chandu Eric MD;  Location: [facility] ENDOSCOPY;  Service: Gastroenterology;  Laterality: N/A;  PRE OP - DYSPHAGIA  POST OP - GASTRITIS    EXCISION MASS LEG Right 10/27/2021    Procedure: MASS SOFT TISSUE EXCISION rIGHT KNEE MASS;  Surgeon: Abdulkadir Ham MD;  Location:  [facility] MAIN OR;  Service: Orthopedics;  Laterality: Right;    FACIAL RECONSTRUCTION SURGERY      as a child    INGUINAL HERNIA REPAIR Right 2/23/2021    Procedure: right INGUINAL HERNIA REPAIR LAPAROSCOPIC WITH DAVINCI ROBOT;  Surgeon: Hiram Ocampo MD;  Location: [facility] MAIN OR;  Service: [facility];  Laterality: Right;    INGUINAL HERNIA REPAIR  02/23/2021       Home Medications:   Medications Prior to Admission   Medication Sig Dispense Refill Last Dose    celecoxib (CeleBREX) 200 MG capsule Take 1 capsule by mouth Daily.   12/8/2023    clotrimazole-betamethasone (LOTRISONE) 1-0.05 % cream Apply 1 application  topically to the appropriate area as directed 2 (Two) Times a Day. To the left foot for 14 days 45 g 0 12/7/2023    nadolol (CORGARD) 20 MG tablet Take 1 tablet by mouth Daily. 90 tablet 3 12/8/2023    pantoprazole (PROTONIX) 40 MG EC tablet Take 1 tablet by mouth Daily. 90 tablet 3 12/8/2023       Allergies:  No Known Allergies    Past Social History:  Social History     Socioeconomic History    Marital status: Single   Tobacco Use    Smoking status: Never    Smokeless tobacco: Never   Vaping Use    Vaping Use: Never used   Substance and Sexual Activity    Alcohol use: Never    Drug use: Never     Comment: STEROID USE X3 YEARS/[id number]    Sexual activity: Not Currently     Partners: Female     Birth control/protection: None     Comment: Am a Buddhist. Abstained from sexual intimacy since 2006       Past Family History: History reviewed. No pertinent family history.   Family History   Adopted: Yes   Problem Relation Age of Onset    Heart disease Maternal Grandmother     Heart attack Maternal Grandmother     Malig Hyperthermia Neg Hx        Review of Systems   All other systems reviewed and are negative.          Objective:     Objective:  Temp:  [97.2 °F (36.2 °C)-98.1 °F (36.7 °C)] 97.2 °F (36.2 °C)  Heart Rate:  [56-62] 58  Resp:  [16] 16  BP: (113-169)/() 123/76  No intake or output data in the  24 hours ending 12/09/23 1023  Body mass index is 31.38 kg/m².      12/08/23  1446 12/09/23  0735   Weight: 102 kg (225 lb) 102 kg (225 lb)           Physical Exam:   Vitals reviewed.   Constitutional:       Appearance: Healthy appearance.   Pulmonary:      Effort: Pulmonary effort is normal.   Cardiovascular:      Normal rate. Regular rhythm. Normal S1. Normal S2.       Murmurs: There is no murmur.      No gallop.  No click. No rub.   Pulses:     Intact distal pulses.   Edema:     Peripheral edema absent.   Neurological:      Mental Status: Alert.          Labs:   Lab Review:     Results from last 7 days   Lab Units 12/09/23  0455 12/08/23  1503   SODIUM mmol/L 143 144   POTASSIUM mmol/L 3.4* 4.2   CHLORIDE mmol/L 109* 106   CO2 mmol/L 24.8 24.6   BUN mg/dL 14 15   CREATININE mg/dL 1.01 1.18   GLUCOSE mg/dL 138* 96   CALCIUM mg/dL 8.2* 9.2   AST (SGOT) U/L  --  17   ALT (SGPT) U/L  --  27     Results from last 7 days   Lab Units 12/09/23  0455 12/08/23  1950 12/08/23  1503   HSTROP T ng/L <6 <6 <6     Results from last 7 days   Lab Units 12/09/23  0455   WBC 10*3/mm3 6.43   HEMOGLOBIN g/dL 13.5   HEMATOCRIT % 39.0   PLATELETS 10*3/mm3 178         Results from last 7 days   Lab Units 12/08/23  1740   CHOLESTEROL mg/dL 141     Results from last 7 days   Lab Units 12/09/23  0455   MAGNESIUM mg/dL 2.0     Results from last 7 days   Lab Units 12/08/23  1740   CHOLESTEROL mg/dL 141   TRIGLYCERIDES mg/dL 98   HDL CHOL mg/dL 33*   LDL CHOL mg/dL 90     Results from last 7 days   Lab Units 12/08/23  1503   PROBNP pg/mL <36.0                 PREVIOUS EKG:          EKG:                   Assessment:       Chest pain        Plan:   1.  Chest discomfort patient had a chest discomfort since noon yesterday.  His troponins are negative his ECG is normal.  He had an echocardiogram done this morning.  He was having the discomfort while his echo was being done.  I personally had initially read his echo which is really unremarkable  except for some mild tricuspid regurgitation.  I went back and we looked at his echo especially focusing on any type of wall motion abnormality and there is none.  Chest discomfort is not consistent with cardiac origin.  Probably related to his other issues associated with his arm.  Will defer to other physicians will sign off.        Thank you for allowing me to participate in the care of Arsenio Beard. Feel free to contact me directly with any further questions or concerns.    Mode Castro MD  [facility]  12/09/23  10:23 EST

## 2023-12-09 NOTE — H&P
[facility]   HISTORY AND PHYSICAL    Patient Name: Arsenio Beard  : 1980  MRN: [id number]  Primary Care Physician:  Michelle Medina APRN  Date of admission: 2023    Subjective   Subjective     Chief Complaint:   Chief Complaint   Patient presents with    Numbness         HPI:    Arsenio Beard is a 43 y.o. male, with a past medical history including, but not limited to, hypertension, GERD, and back pain, presented to the emergency department with a complaint of nonradiating midsternal chest pain as well as left arm numbness.  He states that his left arm has been numb since day.  Not noticed any weakness of his left arm but he feels that this sensation is dulled in the left arm.  States that he was carrying 2 paint a shed today and began having midsternal chest pain.  It is that it is this does not radiate to his jaw, back, shoulder and is constant.  Denies any nausea, vomiting, swelling of his feet and ankles, or shortness of breath.  He states that he is limited on what he can do secondary to a back injury from .  In the emergency department high-sensitivity troponins were less than 6 x 2 all other lab work was unremarkable.  Chest x-ray shows no evidence for acute disease in the chest.  EKG shows sinus rhythm rate of 56.  Cardiology has been consulted to see the patient in the a.m. CT cervical spine has been ordered for his left arm numbness.    Review of Systems   All systems were reviewed and negative except for: What was mentioned above in the HPI    Personal History     Past Medical History:   Diagnosis Date    At risk for sleep apnea     SLEEP STUDY WAS NEGATIVE    Depression 1982    Difficulty swallowing     GERD (gastroesophageal reflux disease)     Hernia 2021    Repaired on 2021    History of COVID-19     2020    History of staph infection 2008    ON BACK    Hypertension     Kidney cysts     Low back pain     Mass of knee     RIGHT    Obesity 2003     Right groin pain     Seasonal allergies     Visual impairment 01/1986       Past Surgical History:   Procedure Laterality Date    ENDOSCOPY N/A 3/16/2022    Procedure: ESOPHAGOGASTRODUODENOSCOPY WITH BIOPSIES;  Surgeon: Chandu Eric MD;  Location: [facility] ENDOSCOPY;  Service: Gastroenterology;  Laterality: N/A;  PRE OP - DYSPHAGIA  POST OP - GASTRITIS    EXCISION MASS LEG Right 10/27/2021    Procedure: MASS SOFT TISSUE EXCISION rIGHT KNEE MASS;  Surgeon: Abdulkadir Ham MD;  Location: [facility] OR;  Service: Orthopedics;  Laterality: Right;    FACIAL RECONSTRUCTION SURGERY      as a child    INGUINAL HERNIA REPAIR Right 2/23/2021    Procedure: right INGUINAL HERNIA REPAIR LAPAROSCOPIC WITH DAVINCI ROBOT;  Surgeon: Hiram Ocampo MD;  Location: [facility] OR;  [facility];  Laterality: Right;    INGUINAL HERNIA REPAIR  02/23/2021       Family History: family history includes Heart attack in his maternal grandmother; Heart disease in his maternal grandmother. He was adopted. Otherwise pertinent FHx was reviewed and not pertinent to current issue.    Social History:  reports that he has never smoked. He has never used smokeless tobacco. He reports that he does not drink alcohol and does not use drugs.    Home Medications:  celecoxib, clotrimazole-betamethasone, nadolol, and pantoprazole    Allergies:  No Known Allergies    Objective   Objective     Vitals:   Temp:  [97.9 °F (36.6 °C)-98.1 °F (36.7 °C)] 97.9 °F (36.6 °C)  Heart Rate:  [56-60] 56  Resp:  [16] 16  BP: (128-169)/() 131/87  Physical Exam    Constitutional: Awake, alert   Eyes: PERRLA, sclerae anicteric, no conjunctival injection   HENT: NCAT, mucous membranes moist   Neck: Supple, no thyromegaly, no lymphadenopathy, trachea midline   Respiratory: Clear to auscultation bilaterally, nonlabored respirations    Cardiovascular: RRR, no murmurs, rubs, or gallops, palpable pedal pulses bilaterally   Gastrointestinal: Positive bowel sounds,  soft, nontender, nondistended   Musculoskeletal: No bilateral ankle edema, no clubbing or cyanosis to extremities   Psychiatric: Appropriate affect, cooperative   Neurologic: Oriented x 3, strength symmetric in all extremities, Cranial Nerves grossly intact to confrontation, speech clear   Skin: No rashes     Result Review    Result Review:  I have personally reviewed the results from the time of this admission to 12/8/2023 19:44 EST and agree with these findings:  [x]  Laboratory list / accordion  []  Microbiology  [x]  Radiology  [x]  EKG/Telemetry   []  Cardiology/Vascular   []  Pathology  [x]  Old records  []  Other:        Assessment & Plan   Assessment / Plan     Brief Patient Summary:  Arsenio Beard is a 43 y.o. male who was admitted to the observation unit for further evaluation and treatment of his chest pain and left arm numbness.    Active Hospital Problems:  Active Hospital Problems    Diagnosis     **Chest pain      Plan:     Chest pain   -Cardiac monitoring  -Vital signs every 4 hours   -Cardiology consult   -High-sensitivity troponin  -EKG-sinus rhythm rate 56  -N.p.o. after midnight     Left arm numbness  -CT cervical spine pending      DVT prophylaxis:  No DVT prophylaxis order currently exists.    CODE STATUS:       Admission Status:  I believe this patient meets observation status.    76 minutes have been spent by [facility] providers in the care of this patient while under observation status.      Appropriate PPE worn during patient encounter.  Hand hygeine performed before and after seeing the patient.      Electronically signed by ANURADHA Bates, 12/08/23, 7:44 PM EST.

## 2023-12-10 LAB
QT INTERVAL: 406 MS
QTC INTERVAL: 413 MS

## 2023-12-11 ENCOUNTER — TRANSITIONAL CARE MANAGEMENT TELEPHONE ENCOUNTER (OUTPATIENT)
Dept: CALL CENTER | Facility: HOSPITAL | Age: 43
End: 2023-12-11
Payer: COMMERCIAL

## 2023-12-11 NOTE — OUTREACH NOTE
Call Center TCM Note      Flowsheet Row Responses   [facility] patient discharged from? [place]   Does the patient have one of the following disease processes/diagnoses(primary or secondary)? Other   TCM attempt successful? No   Unsuccessful attempts Attempt 1  [no verbal release on file.]            Nevin Waldron RN    12/11/2023, 13:58 EST

## 2023-12-11 NOTE — OUTREACH NOTE
Call Center TCM Note      Flowsheet Row Responses   [facility] patient discharged from? [place]   Does the patient have one of the following disease processes/diagnoses(primary or secondary)? Other   TCM attempt successful? No   Unsuccessful attempts Attempt 2            Nevin Waldron RN    12/11/2023, 15:02 EST

## 2023-12-12 ENCOUNTER — TRANSITIONAL CARE MANAGEMENT TELEPHONE ENCOUNTER (OUTPATIENT)
Dept: CALL CENTER | Facility: HOSPITAL | Age: 43
End: 2023-12-12
Payer: COMMERCIAL

## 2023-12-12 NOTE — OUTREACH NOTE
Call Center TCM Note      Flowsheet Row Responses   [facility] patient discharged from? [place]   Does the patient have one of the following disease processes/diagnoses(primary or secondary)? Other   TCM attempt successful? Yes   Call start time 1442   Call end time 1446   Discharge diagnosis Chest pain   Meds reviewed with patient/caregiver? Yes   Does the patient have all medications ordered at discharge? N/A   Does the patient have an appointment with their PCP within 7-14 days of discharge? Yes   Has home health visited the patient within 72 hours of discharge? N/A   Psychosocial issues? No   Did the patient receive a copy of their discharge instructions? Yes   Nursing interventions Reviewed instructions with patient   What is the patient's perception of their health status since discharge? Same   Is the patient/caregiver able to teach back signs and symptoms related to disease process for when to call PCP? Yes   Is the patient/caregiver able to teach back signs and symptoms related to disease process for when to call 911? Yes   Is the patient/caregiver able to teach back the hierarchy of who to call/visit for symptoms/problems? PCP, Specialist, Home health nurse, Urgent Care, ED, 911 Yes   If the patient is a current smoker, are they able to teach back resources for cessation? Not a smoker   TCM call completed? Yes   Call end time 1446   Would this patient benefit from a Referral to Amb Social Work? No   Is the patient interested in additional calls from an ambulatory ? No            Rachell De La Rosa LPN    12/12/2023, 14:48 EST

## 2023-12-15 NOTE — PROGRESS NOTES
Transitional Care Follow Up Visit  Subjective     Arsenio Beard is a 43 y.o. male who presents for a transitional care management visit.    Within 48 business hours after discharge our office contacted him via telephone to coordinate his care and needs.      I reviewed and discussed the details of that call along with the discharge summary, hospital problems, inpatient lab results, inpatient diagnostic studies, and consultation reports with Arsenio.     Current outpatient and discharge medications have been reconciled for the patient.  Reviewed by: ANURADHA Negron          12/9/2023     2:58 PM   Date of TCM Phone Call   [facility]   Date of Admission 12/8/2023   Date of Discharge 12/9/2023   Discharge Disposition Home or Self Care     Risk for Readmission (LACE) Score: 1 (12/9/2023  6:00 AM)      History of Present Illness   Course During Hospital Stay:  Arsenio is a 43 year old patient who was admitted to [facility] observation unit for further evaluation of Chest pain. ACS was ruled out through serial troponins and EKG. Cardiology was consulted. Echo showed EF 58.4% and mild tricuspid regurgitation. No further cardiac testing was warranted   He had numbness in his left arm so CT of the cspine was ordered that showed multilevel DDD . He sees neurosurgery at [facility] for lumbar Disc disease and is followed by pain management. He was discharged. He has a follow up scheduled with neurology.   He continues to have some intermittent mid chest pain that does not radiate. He has continuous numbness in his left arm. He denies weakness. He has a consult with neurology in January. He is scheduled for a lumbar ablation this afternoon.      The following portions of the patient's history were reviewed and updated as appropriate: allergies, current medications, past family history, past medical history, past social history, past surgical history, and problem list.    Review of Systems   Constitutional:  Negative  for fatigue.   Cardiovascular:  Positive for chest pain.   Gastrointestinal:         GERD    Musculoskeletal:  Positive for back pain.   Neurological:  Positive for numbness.       Objective   Physical Exam  Vitals and nursing note reviewed.   Constitutional:       General: He is not in acute distress.  HENT:      Head: Normocephalic.   Cardiovascular:      Rate and Rhythm: Normal rate and regular rhythm.   Pulmonary:      Effort: Pulmonary effort is normal.      Breath sounds: Normal breath sounds.   Musculoskeletal:      Left upper arm: No swelling, edema or tenderness.   Skin:     General: Skin is warm and dry.   Neurological:      Mental Status: He is alert and oriented to person, place, and time.      Sensory: No sensory deficit (in left arm,).      Motor: No weakness.         Assessment & Plan   Diagnoses and all orders for this visit:    1. Gastroesophageal reflux disease, unspecified whether esophagitis present (Primary)  -     Cancel: Ambulatory Referral to Gastroenterology  -     Ambulatory Referral to Gastroenterology    2. Precordial pain    3. Encounter for screening colonoscopy  -     Ambulatory Referral to Gastroenterology    4. Essential hypertension    5. Numbness and tingling in left arm    6. DDD (degenerative disc disease), cervical  -     Ambulatory Referral to Physical Therapy Evaluate and treat    Other orders  -     famotidine (Pepcid) 40 MG tablet; Take 1 tablet by mouth Every Night for 30 days.  Dispense: 30 tablet; Refill: 0        HTN is well controlled  Chest pain could be GI related. He had a normal EGD in April 2022  He will continue protonix in the am and will add pepcid at night for up to 30 days. Discussed dietary changes and GERD precautions.  Will have him follow up with Dr belcher. (Referral placed for passport) He is also due for screening colonoscopy   Will refer to PT . He will also follow up with his neurosurgeon  He has an appt with neurology in January  Follow up as  scheduled and as needed

## 2023-12-18 ENCOUNTER — OFFICE VISIT (OUTPATIENT)
Dept: INTERNAL MEDICINE | Facility: CLINIC | Age: 43
End: 2023-12-18
Payer: COMMERCIAL

## 2023-12-18 VITALS
OXYGEN SATURATION: 97 % | BODY MASS INDEX: 31.5 KG/M2 | SYSTOLIC BLOOD PRESSURE: 130 MMHG | RESPIRATION RATE: 16 BRPM | WEIGHT: 225 LBS | HEIGHT: 71 IN | DIASTOLIC BLOOD PRESSURE: 82 MMHG | HEART RATE: 62 BPM

## 2023-12-18 DIAGNOSIS — R20.2 NUMBNESS AND TINGLING IN LEFT ARM: ICD-10-CM

## 2023-12-18 DIAGNOSIS — K21.9 GASTROESOPHAGEAL REFLUX DISEASE, UNSPECIFIED WHETHER ESOPHAGITIS PRESENT: Primary | ICD-10-CM

## 2023-12-18 DIAGNOSIS — M50.30 DDD (DEGENERATIVE DISC DISEASE), CERVICAL: ICD-10-CM

## 2023-12-18 DIAGNOSIS — I10 ESSENTIAL HYPERTENSION: ICD-10-CM

## 2023-12-18 DIAGNOSIS — R07.2 PRECORDIAL PAIN: ICD-10-CM

## 2023-12-18 DIAGNOSIS — Z12.11 ENCOUNTER FOR SCREENING COLONOSCOPY: ICD-10-CM

## 2023-12-18 DIAGNOSIS — R20.0 NUMBNESS AND TINGLING IN LEFT ARM: ICD-10-CM

## 2023-12-18 RX ORDER — FAMOTIDINE 40 MG/1
40 TABLET, FILM COATED ORAL NIGHTLY
Qty: 30 TABLET | Refills: 0 | Status: SHIPPED | OUTPATIENT
Start: 2023-12-18 | End: 2024-01-17

## 2023-12-18 RX ORDER — NADOLOL 20 MG/1
20 TABLET ORAL DAILY
Qty: 90 TABLET | Refills: 3 | Status: SHIPPED | OUTPATIENT
Start: 2023-12-18

## 2024-02-06 ENCOUNTER — OFFICE VISIT (OUTPATIENT)
Dept: GASTROENTEROLOGY | Facility: CLINIC | Age: 44
End: 2024-02-06
Payer: COMMERCIAL

## 2024-02-06 VITALS
SYSTOLIC BLOOD PRESSURE: 118 MMHG | BODY MASS INDEX: 32.48 KG/M2 | DIASTOLIC BLOOD PRESSURE: 82 MMHG | TEMPERATURE: 96.6 F | HEIGHT: 71 IN | OXYGEN SATURATION: 94 % | HEART RATE: 66 BPM | WEIGHT: 232 LBS

## 2024-02-06 DIAGNOSIS — K21.9 GASTROESOPHAGEAL REFLUX DISEASE, UNSPECIFIED WHETHER ESOPHAGITIS PRESENT: ICD-10-CM

## 2024-02-06 DIAGNOSIS — R13.10 DYSPHAGIA, UNSPECIFIED TYPE: Primary | ICD-10-CM

## 2024-02-06 DIAGNOSIS — Z12.11 SCREENING FOR COLON CANCER: ICD-10-CM

## 2024-02-06 PROCEDURE — 1160F RVW MEDS BY RX/DR IN RCRD: CPT | Performed by: NURSE PRACTITIONER

## 2024-02-06 PROCEDURE — 3079F DIAST BP 80-89 MM HG: CPT | Performed by: NURSE PRACTITIONER

## 2024-02-06 PROCEDURE — 3074F SYST BP LT 130 MM HG: CPT | Performed by: NURSE PRACTITIONER

## 2024-02-06 PROCEDURE — 99214 OFFICE O/P EST MOD 30 MIN: CPT | Performed by: NURSE PRACTITIONER

## 2024-02-06 PROCEDURE — 1159F MED LIST DOCD IN RCRD: CPT | Performed by: NURSE PRACTITIONER

## 2024-02-06 RX ORDER — FAMOTIDINE 40 MG/1
40 TABLET, FILM COATED ORAL DAILY
COMMUNITY
Start: 2023-12-18 | End: 2024-02-06

## 2024-02-06 RX ORDER — TERBINAFINE HYDROCHLORIDE 250 MG/1
TABLET ORAL
COMMUNITY
Start: 2024-01-15

## 2024-02-06 RX ORDER — RABEPRAZOLE SODIUM 20 MG/1
20 TABLET, DELAYED RELEASE ORAL 2 TIMES DAILY
Qty: 180 TABLET | Refills: 3 | Status: SHIPPED | OUTPATIENT
Start: 2024-02-06

## 2024-02-06 NOTE — PROGRESS NOTES
Chief Complaint   Patient presents with    Difficulty Swallowing       HPI    Arsenio Beard is a  43 y.o. male here for a follow up visit for dysphagia.      This patient follows with Dr. Eric, new to me.    Past medical history of sleep apnea, GERD, hypertension, low back pain along with obesity.    Patient presents today with a primary complaint of dysphagia.  He has had issues with esophageal dysphagia dating back at least 12 years slightly worse lately.  He feels food lodged at the sternal notch and will drink water for relief.  He has a longstanding history of GERD.  He is currently on Protonix 40 mg once daily.  He was recently prescribed Pepcid for breakthrough but has yet to start it.  He has been on Protonix for at least 2 years maybe longer.  He is not following an antireflux diet.  No nausea, vomiting, poor appetite or weight loss.    His bowels move at least twice a week.  Cites occasional hemorrhoidal discomfort but denies rectal bleeding.  Based on his age she is due for screening colonoscopy.  Family history unknown as he was adopted twice    Additional data reviewed:    EGD 3/2022 - Erythematous mucosa in the antrum otherwise normal.  Pathology with mild gastritis and esophagitis negative for H. pylori and EOE.    Past Medical History:   Diagnosis Date    At risk for sleep apnea     SLEEP STUDY WAS NEGATIVE    Depression 06/1982    Difficulty swallowing     GERD (gastroesophageal reflux disease)     Hernia 02/02/2021    Repaired on 02/23/2021    History of COVID-19     11/2020    History of staph infection 2008    ON BACK    Hypertension     Kidney cysts     Low back pain     Mass of knee     RIGHT    Obesity 06/2003    Right groin pain     Seasonal allergies     Visual impairment 01/1986       Past Surgical History:   Procedure Laterality Date    ENDOSCOPY N/A 03/16/2022    Procedure: ESOPHAGOGASTRODUODENOSCOPY WITH BIOPSIES;  Surgeon: Chandu Eric MD;  Location: [facility] ENDOSCOPY;   Service: Gastroenterology;  Laterality: N/A;  PRE OP - DYSPHAGIA  POST OP - GASTRITIS    EXCISION MASS LEG Right 10/27/2021    Procedure: MASS SOFT TISSUE EXCISION rIGHT KNEE MASS;  Surgeon: Abdulkadir Ham MD;  Location: [facility];  Service: Orthopedics;  Laterality: Right;    FACIAL RECONSTRUCTION SURGERY      as a child    INGUINAL HERNIA REPAIR Right 02/23/2021    Procedure: right INGUINAL HERNIA REPAIR LAPAROSCOPIC WITH DAVINCI ROBOT;  Surgeon: Hiram Ocampo MD;  Location: [facility];  Service: DaVinci;  Laterality: Right;    INGUINAL HERNIA REPAIR  02/23/2021       Scheduled Meds:     Continuous Infusions: No current facility-administered medications for this visit.      PRN Meds:     No Known Allergies    Social History     Socioeconomic History    Marital status: Single   Tobacco Use    Smoking status: Never    Smokeless tobacco: Never   Vaping Use    Vaping Use: Never used   Substance and Sexual Activity    Alcohol use: Never    Drug use: Never     Comment: STEROID USE X3 YEARS/4510-6972    Sexual activity: Not Currently     Partners: Female     Birth control/protection: None     Comment: Am a Jewish. Abstained from sexual intimacy since 2006       Family History   Adopted: Yes   Problem Relation Age of Onset    Heart disease Maternal Grandmother     Heart attack Maternal Grandmother     Malig Hyperthermia Neg Hx        Review of Systems   HENT:  Positive for trouble swallowing.    Gastrointestinal: Negative.        Vitals:    02/06/24 1023   BP: 118/82   Pulse: 66   Temp: 96.6 °F (35.9 °C)   SpO2: 94%       Physical Exam  Constitutional:       Appearance: He is well-developed.   Abdominal:      General: Bowel sounds are normal. There is no distension.      Palpations: Abdomen is soft. There is no mass.      Tenderness: There is no abdominal tenderness. There is no guarding.      Hernia: No hernia is present.   Skin:     General: Skin is warm and dry.      Capillary Refill: Capillary refill  takes less than 2 seconds.   Neurological:      Mental Status: He is alert and oriented to person, place, and time.   Psychiatric:         Behavior: Behavior normal.     Assessment    Diagnoses and all orders for this visit:    1. Dysphagia, unspecified type (Primary)    2. Gastroesophageal reflux disease, unspecified whether esophagitis present    3. Screening for colon cancer    Other orders  -     RABEprazole (ACIPHEX) 20 MG EC tablet; Take 1 tablet by mouth 2 (Two) Times a Day.  Dispense: 180 tablet; Refill: 3    Plan    Schedule esophagram for further evaluation of symptoms  Stop Protonix  Start Aciphex twice daily dosing  Follow an antireflux diet  Tentative EGD and screening colonoscopy to follow         ANURADHA Mejia  [facility]  [address] [place] [address]  Office: [phone]

## 2024-02-07 ENCOUNTER — TELEPHONE (OUTPATIENT)
Dept: GASTROENTEROLOGY | Facility: CLINIC | Age: 44
End: 2024-02-07
Payer: COMMERCIAL

## 2024-02-08 ENCOUNTER — TELEPHONE (OUTPATIENT)
Dept: GASTROENTEROLOGY | Facility: CLINIC | Age: 44
End: 2024-02-08
Payer: COMMERCIAL

## 2024-02-08 PROBLEM — Z12.11 SCREENING FOR COLON CANCER: Status: ACTIVE | Noted: 2024-02-06

## 2024-02-08 NOTE — TELEPHONE ENCOUNTER
MACARENA GARCIA IN SCHEDULING PT SCHEDULED FOR 05/08/2024 ARRIVING 10:10AM.[employer] PREP INSTRUCTIONS MAILED TO ADDRESS ON FILE VERIFIED BY PT.OK FOR HUB TO READ

## 2024-02-09 NOTE — TELEPHONE ENCOUNTER
Pa has been approved.     Per covermymeds:  The request has been approved. The authorization is effective from 02/08/2024 to 02/07/2025, as long as the member is enrolled in their current health plan. The request was approved as submitted. A written notification letter will follow with additional details.    Called the patient to verbalized that his pa has been approved and pharmacy can rerun the prescription.

## 2024-02-21 NOTE — PROGRESS NOTES
Knee MRI Follow Up      Patient: Arsenio Beard        YOB: 1980            Chief Complaints: Knee pain      History of Present Illness: The patient is here follow-up of an MRI of the knee      Physical Exam: 43 y.o. male  General Appearance:    Alert, cooperative, in no acute distress                 There were no vitals filed for this visit.     Patient is alert and read ×3 no acute distress appears her above-listed at height weight and age.  Affect is normal respiratory rate is normal unlabored. Heart rate regular rate rhythm, sclera, dentition and hearing are normal for the purpose of this exam.      Ortho Exam      MRI Results:    Procedures      Assessment/Plan:

## 2024-02-23 ENCOUNTER — OFFICE VISIT (OUTPATIENT)
Dept: ORTHOPEDIC SURGERY | Facility: CLINIC | Age: 44
End: 2024-02-23
Payer: COMMERCIAL

## 2024-02-23 VITALS — HEIGHT: 71 IN | WEIGHT: 234.3 LBS | BODY MASS INDEX: 32.8 KG/M2 | TEMPERATURE: 98.4 F

## 2024-02-23 DIAGNOSIS — M25.561 RIGHT KNEE PAIN, UNSPECIFIED CHRONICITY: Primary | ICD-10-CM

## 2024-02-23 DIAGNOSIS — M76.31 ILIOTIBIAL BAND SYNDROME OF RIGHT SIDE: ICD-10-CM

## 2024-02-23 PROCEDURE — 99213 OFFICE O/P EST LOW 20 MIN: CPT | Performed by: ORTHOPAEDIC SURGERY

## 2024-02-23 RX ORDER — AMANTADINE HYDROCHLORIDE 100 MG/1
TABLET ORAL
COMMUNITY
Start: 2024-02-07

## 2024-02-23 NOTE — PROGRESS NOTES
New Knee      Patient: Arsenio Beard        YOB: 1980    Medical Record Number: [id number]        Chief Complaints: Right knee pain      History of Present Illness: This is a 43-year-old male who I saw back in 2021 he was found to have a mass on his right knee I sent him to Dr. Abdulkadir Ham who removed it was benign he states his knee was doing fine until about 4 weeks ago he noticed pain on the lateral aspect of his right knee no new history injury change in activity B we walks quite a bit he will walk 6-8+ miles a day mostly flat but on some incline his pain is primarily lateral no swelling no palpable tenderness        Allergies: No Known Allergies    Medications:   Home Medications:  Current Outpatient Medications on File Prior to Visit   Medication Sig    amantadine (SYMMETREL) 100 MG tablet     celecoxib (CeleBREX) 200 MG capsule Take 1 capsule by mouth Daily.    Cholecalciferol 1.25 MG (85193 UT) tablet Take 1 tablet by mouth Daily.    clotrimazole-betamethasone (LOTRISONE) 1-0.05 % cream Apply 1 application  topically to the appropriate area as directed 2 (Two) Times a Day. To the left foot for 14 days    nadolol (CORGARD) 20 MG tablet TAKE ONE TABLET BY MOUTH DAILY    RABEprazole (ACIPHEX) 20 MG EC tablet Take 1 tablet by mouth 2 (Two) Times a Day.    terbinafine (lamiSIL) 250 MG tablet      No current facility-administered medications on file prior to visit.     Current Medications:  Scheduled Meds:  Continuous Infusions:No current facility-administered medications for this visit.    PRN Meds:.    Past Medical History:   Diagnosis Date    At risk for sleep apnea     SLEEP STUDY WAS NEGATIVE    Depression 06/1982    Difficulty swallowing     GERD (gastroesophageal reflux disease)     Hernia 02/02/2021    Repaired on 02/23/2021    History of COVID-19     11/2020    History of staph infection 2008    ON BACK    Hypertension     Kidney cysts     Low back pain     Mass of knee     RIGHT     "Obesity 06/2003    Right groin pain     Seasonal allergies     Visual impairment 01/1986        Past Surgical History:   Procedure Laterality Date    ENDOSCOPY N/A 03/16/2022    Procedure: ESOPHAGOGASTRODUODENOSCOPY WITH BIOPSIES;  Surgeon: Chandu Eric MD;  Location: [facility] ENDOSCOPY;  Service: Gastroenterology;  Laterality: N/A;  PRE OP - DYSPHAGIA  POST OP - GASTRITIS    EXCISION MASS LEG Right 10/27/2021    Procedure: MASS SOFT TISSUE EXCISION rIGHT KNEE MASS;  Surgeon: Abdulkadir Ham MD;  Location: [facility] MAIN OR;  Service: Orthopedics;  Laterality: Right;    FACIAL RECONSTRUCTION SURGERY      as a child    INGUINAL HERNIA REPAIR Right 02/23/2021    Procedure: right INGUINAL HERNIA REPAIR LAPAROSCOPIC WITH DAVINCI ROBOT;  Surgeon: Hiram Ocampo MD;  Location: [facility] MAIN OR;  Service: [facility];  Laterality: Right;    INGUINAL HERNIA REPAIR  02/23/2021        Social History     Occupational History    Not on file   Tobacco Use    Smoking status: Never     Passive exposure: Never    Smokeless tobacco: Never   Vaping Use    Vaping Use: Never used   Substance and Sexual Activity    Alcohol use: Never    Drug use: Never     Comment: STEROID USE X3 YEARS/5168-8820    Sexual activity: Not Currently     Partners: Female     Birth control/protection: None     Comment: Am a Yazidi. Abstained from sexual intimacy since 2006      Social History     Social History Narrative    Not on file        Family History   Adopted: Yes   Problem Relation Age of Onset    Heart disease Maternal Grandmother     Heart attack Maternal Grandmother     Malig Hyperthermia Neg Hx              Review of Systems:     Review of Systems      Physical Exam: 43 y.o. male  General Appearance:    Alert, cooperative, in no acute distress                   Vitals:    02/23/24 1149   Temp: 98.4 °F (36.9 °C)   Weight: 106 kg (234 lb 4.8 oz)   Height: 180.3 cm (71\")   PainSc:   5   PainLoc: Knee      Patient is alert and read ×3 no acute " distress appears her above-listed at height weight and age.  Affect is normal respiratory rate is normal unlabored. Heart rate regular rate rhythm, sclera, dentition and hearing are normal for the purpose of this exam.        Ortho Exam  Exam his right knee no effusion no redness he has no palpable tenderness medially or laterally he does have tight hamstrings and IT band    Procedures             Radiology:   AP, Lateral and merchant views of the right knee  were ordered/reviewed to evauateknee pain.  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 X-ray performed in the DR room.            Assessment/Plan: Right knee pain I think this is more IT band which I discussed with in detail plan is to show him a patellar tendon strap over the IT band having stretches hamstrings quads and IT band we will have him see physical therapy I will see him back 4 to 5 weeks if he still symptomatic we can pursue other means of testing

## 2024-02-27 ENCOUNTER — HOSPITAL ENCOUNTER (OUTPATIENT)
Dept: PHYSICAL THERAPY | Facility: HOSPITAL | Age: 44
Setting detail: THERAPIES SERIES
Discharge: HOME | End: 2024-02-27
Payer: COMMERCIAL

## 2024-02-27 DIAGNOSIS — Z74.09 IMPAIRED MOBILITY: ICD-10-CM

## 2024-02-27 DIAGNOSIS — M79.602 LEFT ARM PAIN: ICD-10-CM

## 2024-02-27 DIAGNOSIS — M54.2 CERVICALGIA: Primary | ICD-10-CM

## 2024-02-27 PROCEDURE — 97162 PT EVAL MOD COMPLEX 30 MIN: CPT | Performed by: PHYSICAL THERAPIST

## 2024-02-27 NOTE — THERAPY EVALUATION
Outpatient Physical Therapy Ortho Initial Evaluation  [facility]     Patient Name: Arsenio Beard  : 1980  MRN: [id number]  Today's Date: 2024      Visit Date: 2024    Patient Active Problem List   Diagnosis    Precordial pain    Essential hypertension    Right inguinal hernia    Knee mass, right    Annular tear of lumbar disc    Gastroesophageal reflux disease    Chest pain    Screening for colon cancer        Past Medical History:   Diagnosis Date    At risk for sleep apnea     SLEEP STUDY WAS NEGATIVE    Depression 1982    Difficulty swallowing     GERD (gastroesophageal reflux disease)     Hernia 2021    Repaired on 2021    History of COVID-19     2020    History of staph infection     ON BACK    Hypertension     Kidney cysts     Low back pain     Mass of knee     RIGHT    Obesity 2003    Right groin pain     Seasonal allergies     Visual impairment 1986        Past Surgical History:   Procedure Laterality Date    ENDOSCOPY N/A 2022    Procedure: ESOPHAGOGASTRODUODENOSCOPY WITH BIOPSIES;  Surgeon: Chandu Eric MD;  Location: [facility] ENDOSCOPY;  Service: Gastroenterology;  Laterality: N/A;  PRE OP - DYSPHAGIA  POST OP - GASTRITIS    EXCISION MASS LEG Right 10/27/2021    Procedure: MASS SOFT TISSUE EXCISION rIGHT KNEE MASS;  Surgeon: Abdulkadir Ham MD;  Location: [facility];  Service: Orthopedics;  Laterality: Right;    FACIAL RECONSTRUCTION SURGERY      as a child    INGUINAL HERNIA REPAIR Right 2021    Procedure: right INGUINAL HERNIA REPAIR LAPAROSCOPIC WITH DAVINCI ROBOT;  Surgeon: Hiram Ocampo MD;  Location: [facility];  Service: DaVinci;  Laterality: Right;    INGUINAL HERNIA REPAIR  2021       Visit Dx:     ICD-10-CM ICD-9-CM   1. Cervicalgia  M54.2 723.1   2. Impaired mobility  Z74.09 799.89   3. Left arm pain  M79.602 729.5          Patient History       Row Name 24 1000             History    Chief  Complaint Difficulty with daily activities;Pain  -GJ      Type of Pain Neck pain;Elbow pain  -GJ      Date Current Problem(s) Began --  12/2023  -GJ      Brief Description of Current Complaint Mr. Beard is a 44 y/o R handed male. He present with chief c/o c spine/LUE pain. He reports going to ED in 12/2023 with chest pain/LUE pain and burning above his elbow (medial in nature). (Of note, has order for LBP/LLE pain as well, but he wishes to address c spine pain today).   No overt BROOKE.  His condition is improving since 12/20023. Pain location is neck to lowest part of spine (he reports having ablation for L spine last month and will be having another one soon).  He also reports burning L medial elbow. He had had symptos along entire course of LUE to wrist (not hand).  The burning is intermittent in nature. Aggravating activities include resting LUE on car door or at 90 degrees abducted with elbow flexed about 90 degrees.   Relieving activities includes avoiding abducted/elbow flexed position of LUE. He report numbness from L shoulder to wrist. Denies change in bowel/bladder habits. Denies sleep disturbance. CT scan of c spine, see epic. No previous treatments for this condition. Not working currently, unable to do physical work. Has not worked since 2021. Hobbies include art  -GJ      Previous treatment for THIS PROBLEM --  nothing to date  -GJ      Patient/Caregiver Goals Relieve pain;Improve mobility;Improve strength;Know what to do to help the symptoms  -GJ      Hand Dominance right-handed  -GJ      Occupation/sports/leisure activities art, not working  -GJ      What clinical tests have you had for this problem? CT scan  -GJ      Results of Clinical Tests see epic  -GJ         Pain     Pain Location Neck;Arm  L  -GJ      What Performance Factors Make the Current Problem(s) WORSE? L shoulder abducted to 90, elbow flexed resting on something  -GJ      What Performance Factors Make the Current Problem(s) BETTER?  avoidacnce of above position  -GJ         Fall Risk Assessment    Any falls in the past year: No  -GJ         Daily Activities    Primary Language English  -GJ      Are you able to read Yes  -GJ      Are you able to write Yes  -GJ      How does patient learn best? Demonstration;Pictures/Video;Reading;Listening  -GJ      Teaching needs identified Home Exercise Program;Management of Condition  -GJ      Patient is concerned about/has problems with Performing home management (household chores, shopping, care of dependents);Performing job responsibilities/community activities (work, school,;Performing sports, recreation, and play activities  -GJ      Barriers to learning None  -GJ      Pt Participated in POC and Goals Yes  -GJ                User Key  (r) = Recorded By, (t) = Taken By, (c) = Cosigned By      Initials Name Provider Type    GJ Martín Marquis, PT Physical Therapist                     PT Ortho       Row Name 02/27/24 1000       Posture/Observations    Alignment Options Forward head;Cervical lordosis;Thoracic kyphosis;Rounded shoulders;Scapular elevation;Scapular winging  -GJ    Forward Head Mild;Moderate  -GJ    Thoracic Kyphosis Mild;Increased  -GJ    Rounded Shoulders Left:;Mild;Increased  -GJ       Quarter Clearing    Quarter Clearing Upper Quarter Clearing  -GJ       DTR- Upper Quarter Clearing    Biceps (C5/6) Bilateral:;1- Minimal response  -GJ    Brachioradialis (C6) Bilateral:;1- Minimal response  -GJ    Triceps (C7) Bilateral:;1- Minimal response  -GJ       Myotomal Screen- Upper Quarter Clearing    Shoulder flexion (C5) Bilateral:;4+ (Good +)  -GJ    Elbow flexion/wrist extension (C6) Bilateral:;4+ (Good +)  -GJ    Elbow extension/wrist flexion (C7) Bilateral:;4+ (Good +)  -GJ    Finger flexion/ (C8) Bilateral:;5 (Normal)  -GJ    Finger abduction (T1) Bilateral:;4+ (Good +)  -GJ       Cervical/Shoulder ROM Screen    Cervical flexion Normal  -GJ    Cervical extension Normal  -GJ    Cervical  lateral flexion Normal  -GJ    Cervical rotation Normal  -GJ       Special Tests/Palpation    Special Tests/Palpation Cervical/Thoracic  -GJ       Cervical Palpation    Cervical Palpation- Location? Suboccipital;Levator scapula;Upper traps  -GJ    Suboccipital Bilateral:;Guarded/taut  -GJ    Levator Scapula Bilateral:;Guarded/taut  -GJ    Upper Traps Bilateral:;Guarded/taut  -GJ       Cervical/Thoracic Special Tests    Spurlings (Foraminal Compression) Bilateral:;Negative  -GJ    Cervical Compression (Forarminal Compression vs. Facet Pain) Negative  -GJ    Sharp-Jose (AA instability) Negative  -GJ       Elbow/Forearm Special Tests    Tinel's Sign (Ulnar Nerve Irritation) Left:;Positive  -GJ    Pronator Teres Syndrome Test (Median Nerve Entrapment) Left:;Negative  -GJ       Elbow/Forearm Palpation    Elbow/Forearm Palpation? Yes  -GJ       General ROM    GENERAL ROM COMMENTS Bilateral shoulder active range of motion symmetrical and within functional limits, bilateral elbow active range of motion symmetrical within functional limits and noncontributory, bilateral elbow active range of motion symmetrical within functional limits noncontributory  -GJ        Strength Right    # Reps 2  -GJ    Right Rung 2  -GJ    Right  Test 1 125  -GJ    Right  Test 2 110  -GJ     Strength Average Right 117.5  -GJ        Strength Left    # Reps 2  -GJ    Left Rung 2  -GJ    Left  Test 1 120  -GJ    Left  Test 2 110  -GJ     Strength Average Left 115  -GJ       Flexibility    Flexibility Tested? Upper Extremity  -GJ       Upper Extremity Flexibility    Suboccipitals Bilateral:;Mildly limited  -GJ    Upper Trapezius Bilateral:;Mildly limited;Moderately limited  -GJ    Levator Scapula Bilateral:;Mildly limited;Moderately limited  -GJ    Pect Minor Bilateral:;Mildly limited;Moderately limited  -GJ    Pect Major Bilateral:;Mildly limited;Moderately limited  -GJ    Latissimus Dorsi Bilateral:;Mildly  limited;Moderately limited  -GJ       Hand  Strength     Strength Affected Side Bilateral  -GJ              User Key  (r) = Recorded By, (t) = Taken By, (c) = Cosigned By      Initials Name Provider Type    Martín Fernandes, PT Physical Therapist                                Therapy Education  Education Details: discussed dx, px, poc, discussed anatomy of the spine/upper extremity and physiology of healing, discussed realistic expectations and time frames for therapy. Discussed activity modification.  Discussed avoidance of aggravating activities and positions, discussed padding elbow if she will be applying pressure to the elbow particularly on the medial aspect..  Encourage patient to call with any questions or concerns  Given: HEP, Symptoms/condition management, Pain management, Posture/body mechanics, Mobility training, Edema management  Program: New  How Provided: Verbal, Demonstration, Written  Provided to: Patient  Level of Understanding: Teach back education performed, Verbalized, Demonstrated      PT OP Goals       Row Name 02/27/24 1000          PT Short Term Goals    STG Date to Achieve --  -GJ     STG 1 --  -GJ     STG 1 Progress --  -GJ     STG 2 --  -GJ     STG 2 Progress --  -GJ        Long Term Goals    LTG Date to Achieve 05/27/24  -GJ     LTG 1 pt. to be educated in/verbalize understanding of the importance of posture/ergonomics in association with their condition to facilitate self management of their condition  -GJ     LTG 1 Progress Met  -GJ     LTG 2 --  -GJ     LTG 2 Progress --  -GJ        Time Calculation    PT Goal Re-Cert Due Date 05/27/24  -GJ               User Key  (r) = Recorded By, (t) = Taken By, (c) = Cosigned By      Initials Name Provider Type    Martín Fernandes, PT Physical Therapist                     PT Assessment/Plan       Row Name 02/27/24 1035          PT Assessment    Functional Limitations Performance in leisure activities  -GJ     Impairments Impaired  postural alignment;Poor body mechanics;Posture  -GJ     Assessment Comments Mr. Beard is a 43-year-old right-handed male.  He presents with primary complaint of C-spine and left upper extremity pain/numbness and tingling.  He reports this condition started in 12/2023.  He sought care in emergency department for chest pain left upper extremity pain and burning along the medial elbow region.  No overt BROOKE.  His condition has been improving since 12/2023.  No previous treatments for this condition.  Pain location is described as cervical spine and left shoulder to left hand intermittently.  (Of note he has been undergoing treatment for lumbar spine spine pain which has included radiofrequency ablations most recently last month.  He reports that he will be undergoing another round of these soon.  He also reports whole spine impairments for which she is can be worked up for by neurologist soon.)  Aggravating activities for presenting complaint including resting his elbow on the car window (shoulder abducted 90 degrees elbow flexion 90 degrees) or on the arm of the couch (again shoulder abducted 90 degrees above flexed approximate 90 degrees).  Relieving activities include avoidance of above postures.  He denies sleep disturbance.  He denies changes in bowel bladder habits.  He is not currently working and has been and able to since 2021 secondary to full spine pain. Mr. Beard demonstrates mild forward head, mild rounded shoulder posture.  He demonstrates symmetrical and within functional limits active range of motion of bilateral shoulders, bilateral elbows, bilateral wrist.  He demonstrates normal myotomal testing in the upper quarter.  He demonstrates negative Spurling's, negative sharp pressures, negative cervical spine compression testing.  He does demonstrate positive Tinel's testing over the left cubital tunnel with reproduction of comparable signs.  Secondary to his condition improving with avoidance of  aggravating positions and postures we discussed techniques to avoid these positions and padding of his left elbow to avoid pressure on the ulnar nerve.  Suspect Left cubital tunnel syndrome.  At this time we decided to hold on formalized physical therapy and lieu of independent management and avoidance of positions and postures.  He was given contact information for this therapist encouraged to reach out at any time with any question concerns or worsening his condition and we will certainly be happy to resume physical therapy at that time. Mr. Beard is in agreement with above plan will hold on formal physical therapy.  -GJ     Please refer to paper survey for additional self-reported information No  -GJ     Rehab Potential Excellent  -GJ     Patient/caregiver participated in establishment of treatment plan and goals Yes  -GJ     Patient would benefit from skilled therapy intervention No  -GJ        PT Plan    Planned CPT's? PT EVAL MOD COMPLELITY: 96997  -     PT Plan Comments Educated patient on activity modifications and avoidance of aggravating positions and postures.  Patient will hold on further therapy at this point time and practice independent management.  He is encouraged to call with questions or concerns and we can resume if needed  -               User Key  (r) = Recorded By, (t) = Taken By, (c) = Cosigned By      Initials Name Provider Type     Martín Marquis, PT Physical Therapist                                                  Time Calculation:     Start Time: 1001  Stop Time: 1035  Time Calculation (min): 34 min     Therapy Charges for Today       Code Description Service Date Service Provider Modifiers Qty    [id number]  PT EVAL MOD COMPLEXITY 2 2/27/2024 Martín Marquis, PT GP 1                      Martín Marquis, PT  2/27/2024

## 2024-03-07 ENCOUNTER — OFFICE VISIT (OUTPATIENT)
Dept: INTERNAL MEDICINE | Facility: CLINIC | Age: 44
End: 2024-03-07
Payer: COMMERCIAL

## 2024-03-07 VITALS
HEIGHT: 71 IN | SYSTOLIC BLOOD PRESSURE: 120 MMHG | RESPIRATION RATE: 16 BRPM | HEART RATE: 62 BPM | OXYGEN SATURATION: 97 % | DIASTOLIC BLOOD PRESSURE: 70 MMHG | BODY MASS INDEX: 33.18 KG/M2 | WEIGHT: 237 LBS

## 2024-03-07 DIAGNOSIS — N50.811 RIGHT TESTICULAR PAIN: Primary | ICD-10-CM

## 2024-03-07 PROCEDURE — 3078F DIAST BP <80 MM HG: CPT | Performed by: NURSE PRACTITIONER

## 2024-03-07 PROCEDURE — 1159F MED LIST DOCD IN RCRD: CPT | Performed by: NURSE PRACTITIONER

## 2024-03-07 PROCEDURE — 3074F SYST BP LT 130 MM HG: CPT | Performed by: NURSE PRACTITIONER

## 2024-03-07 PROCEDURE — 1160F RVW MEDS BY RX/DR IN RCRD: CPT | Performed by: NURSE PRACTITIONER

## 2024-03-07 PROCEDURE — 99213 OFFICE O/P EST LOW 20 MIN: CPT | Performed by: NURSE PRACTITIONER

## 2024-03-07 RX ORDER — LAMOTRIGINE 25 MG/1
TABLET ORAL
COMMUNITY
Start: 2024-02-26

## 2024-03-07 NOTE — PROGRESS NOTES
Subjective   Arsenio Beard is a 43 y.o. male. Patient is here today for   Chief Complaint   Patient presents with    Testicle Pain   Answers submitted by the patient for this visit:  Primary Reason for Visit (Submitted on 2/29/2024)  What is the primary reason for your visit?: Other  Other (Submitted on 2/29/2024)  Please describe your symptoms.: Occasional pain in right testicle.  Have you had these symptoms before?: Yes  How long have you been having these symptoms?: Greater than 2 weeks  Please list any medications you are currently taking for this condition.: NA  Please describe any probable cause for these symptoms. : Unknown         Vitals:    03/07/24 1344   BP: 120/70   Pulse: 62   Resp: 16   SpO2: 97%     Body mass index is 33.05 kg/m².  The following portions of the patient's history were reviewed and updated as appropriate: allergies, current medications, past family history, past medical history, past social history, past surgical history and problem list.    Past Medical History:   Diagnosis Date    At risk for sleep apnea     SLEEP STUDY WAS NEGATIVE    Depression 06/1982    Difficulty swallowing     GERD (gastroesophageal reflux disease)     Hernia 02/02/2021    Repaired on 02/23/2021    History of COVID-19     11/2020    History of staph infection 2008    ON BACK    Hypertension     Kidney cysts     Low back pain     Mass of knee     RIGHT    Obesity 06/2003    Right groin pain     Seasonal allergies     Visual impairment 01/1986      No Known Allergies   Social History     Socioeconomic History    Marital status: Single   Tobacco Use    Smoking status: Never     Passive exposure: Never    Smokeless tobacco: Never   Vaping Use    Vaping status: Never Used   Substance and Sexual Activity    Alcohol use: Never    Drug use: Never     Comment: STEROID USE X3 YEARS/1380-1924    Sexual activity: Not Currently     Partners: Female     Birth control/protection: None     Comment: Am a [employer]. Abstained  from sexual intimacy since 2006        Current Outpatient Medications:     celecoxib (CeleBREX) 200 MG capsule, Take 1 capsule by mouth Daily., Disp: , Rfl:     Cholecalciferol 1.25 MG (12084 UT) tablet, Take 1 tablet by mouth Daily., Disp: , Rfl:     lamoTRIgine (LaMICtal) 25 MG tablet, , Disp: , Rfl:     nadolol (CORGARD) 20 MG tablet, TAKE ONE TABLET BY MOUTH DAILY, Disp: 90 tablet, Rfl: 3    RABEprazole (ACIPHEX) 20 MG EC tablet, Take 1 tablet by mouth 2 (Two) Times a Day., Disp: 180 tablet, Rfl: 3    terbinafine (lamiSIL) 250 MG tablet, , Disp: , Rfl:     amantadine (SYMMETREL) 100 MG tablet, , Disp: , Rfl:     clotrimazole-betamethasone (LOTRISONE) 1-0.05 % cream, Apply 1 application  topically to the appropriate area as directed 2 (Two) Times a Day. To the left foot for 14 days (Patient not taking: Reported on 3/7/2024), Disp: 45 g, Rfl: 0     Objective     History of Present Illness  Arsenio is a 43 year old male patient who is here for an acute visit. He c/o intermittent right testicular pain that comes and goes. It started more than 10-15 years ago and has been intermittent since. He denies any known injury. It is sharp and moderate it severity. It does not radiates. He denies dysuria, urgency, frequency. He denies any risk for STD.   Testicle Pain  The patient's primary symptoms include testicular pain. This is a chronic problem. The current episode started more than 1 year ago. The problem occurs intermittently. The problem has been waxing and waning. The pain is moderate. Pertinent negatives include no abdominal pain, discolored urine, dysuria, fever, flank pain, frequency, hematuria, hesitancy, nausea, urgency or urinary retention. The testicular pain affects the right testicle. The color of the testicles is Normal. Nothing aggravates the symptoms. He has tried nothing for the symptoms. He is not sexually active. His past medical history is significant for an inguinal hernia (repair on right in 2021).         Review of Systems   Constitutional:  Negative for fever.   Gastrointestinal:  Negative for abdominal pain and nausea.   Genitourinary:  Positive for testicular pain. Negative for dysuria, flank pain, frequency, hesitancy and urgency.       Physical Exam  Vitals and nursing note reviewed.   Constitutional:       General: He is not in acute distress.  Cardiovascular:      Rate and Rhythm: Normal rate and regular rhythm.   Pulmonary:      Effort: Pulmonary effort is normal.      Breath sounds: Normal breath sounds.   Abdominal:      Hernia: There is no hernia in the left inguinal area or right inguinal area.   Genitourinary:     Penis: Normal and circumcised.       Testes:         Right: Mass, tenderness or swelling not present. Right testis is descended.         Left: Mass, tenderness or swelling not present. Left testis is descended.      Comments: Right testicle is slightly higher than left   Lymphadenopathy:      Lower Body: No right inguinal adenopathy. No left inguinal adenopathy.   Neurological:      Mental Status: He is alert.         Assessment    ASSESSMENT    Problems Addressed this Visit    None  Visit Diagnoses       Right testicular pain    -  Primary    Relevant Orders    Ambulatory Referral to Urology    US Testicular or Ovarian Vascular Limited    Urinalysis With Culture If Indicated - Urine, Clean Catch          Diagnoses         Codes Comments    Right testicular pain    -  Primary ICD-10-CM: N50.811  ICD-9-CM: 608.9             PLAN  Will get UA and testicular US for further evaluation and refer to urology  Pt advised to avoid any heavy lifting   Advised to go to the ER for severe pain   Follow up as needed and for continual care

## 2024-03-08 LAB
APPEARANCE UR: CLEAR
BACTERIA #/AREA URNS HPF: NORMAL /[HPF]
BILIRUB UR QL STRIP: NEGATIVE
CASTS URNS QL MICRO: NORMAL /LPF
COLOR UR: YELLOW
EPI CELLS #/AREA URNS HPF: NORMAL /HPF (ref 0–10)
GLUCOSE UR QL STRIP: NEGATIVE
HGB UR QL STRIP: NEGATIVE
KETONES UR QL STRIP: NEGATIVE
LEUKOCYTE ESTERASE UR QL STRIP: NEGATIVE
MICRO URNS: NORMAL
MICRO URNS: NORMAL
NITRITE UR QL STRIP: NEGATIVE
PH UR STRIP: 6 [PH] (ref 5–7.5)
PROT UR QL STRIP: NORMAL
RBC #/AREA URNS HPF: NORMAL /HPF (ref 0–2)
SP GR UR STRIP: 1.03 (ref 1–1.03)
URINALYSIS REFLEX: NORMAL
UROBILINOGEN UR STRIP-MCNC: 1 MG/DL (ref 0.2–1)
WBC #/AREA URNS HPF: NORMAL /HPF (ref 0–5)

## 2024-03-14 DIAGNOSIS — N50.811 RIGHT TESTICULAR PAIN: Primary | ICD-10-CM

## 2024-03-18 ENCOUNTER — OFFICE VISIT (OUTPATIENT)
Dept: NEUROLOGY | Facility: CLINIC | Age: 44
End: 2024-03-18
Payer: COMMERCIAL

## 2024-03-18 ENCOUNTER — PATIENT ROUNDING (BHMG ONLY) (OUTPATIENT)
Dept: NEUROLOGY | Facility: CLINIC | Age: 44
End: 2024-03-18
Payer: COMMERCIAL

## 2024-03-18 VITALS
HEART RATE: 66 BPM | BODY MASS INDEX: 33.04 KG/M2 | SYSTOLIC BLOOD PRESSURE: 132 MMHG | OXYGEN SATURATION: 98 % | HEIGHT: 71 IN | DIASTOLIC BLOOD PRESSURE: 84 MMHG | WEIGHT: 236 LBS

## 2024-03-18 DIAGNOSIS — R20.0 NUMBNESS AND TINGLING IN LEFT ARM: Primary | ICD-10-CM

## 2024-03-18 DIAGNOSIS — G89.29 CHRONIC MID BACK PAIN: ICD-10-CM

## 2024-03-18 DIAGNOSIS — M54.2 CHRONIC NECK PAIN: ICD-10-CM

## 2024-03-18 DIAGNOSIS — R20.2 NUMBNESS AND TINGLING IN LEFT ARM: Primary | ICD-10-CM

## 2024-03-18 DIAGNOSIS — G89.29 CHRONIC NECK PAIN: ICD-10-CM

## 2024-03-18 DIAGNOSIS — M54.9 CHRONIC MID BACK PAIN: ICD-10-CM

## 2024-03-18 PROCEDURE — 3079F DIAST BP 80-89 MM HG: CPT | Performed by: PSYCHIATRY & NEUROLOGY

## 2024-03-18 PROCEDURE — 1159F MED LIST DOCD IN RCRD: CPT | Performed by: PSYCHIATRY & NEUROLOGY

## 2024-03-18 PROCEDURE — 3075F SYST BP GE 130 - 139MM HG: CPT | Performed by: PSYCHIATRY & NEUROLOGY

## 2024-03-18 PROCEDURE — 99204 OFFICE O/P NEW MOD 45 MIN: CPT | Performed by: PSYCHIATRY & NEUROLOGY

## 2024-03-18 PROCEDURE — 1160F RVW MEDS BY RX/DR IN RCRD: CPT | Performed by: PSYCHIATRY & NEUROLOGY

## 2024-03-18 RX ORDER — DULOXETIN HYDROCHLORIDE 20 MG/1
CAPSULE, DELAYED RELEASE ORAL
COMMUNITY
Start: 2024-03-15

## 2024-03-18 NOTE — PROGRESS NOTES
"Chief Complaint   Patient presents with    Numbness       Patient ID: Arsenio Beard is a 43 y.o. male.    HPI: I had the pleasure of seeing your patient today.  As you may know he is a 43-year-old gentleman here for the evaluation and treatment for history of numbness of the left arm.  In talking with him he says that he has suffered from chronic lower back pain and has been in pain management for that issue since 2020.  He says that for the past few months he has noted more mid back pain and perhaps neck pain.  It comes and goes.  He has also noted some numbness of the left arm.  He describes the numbness as a \"tingling\".  He notices it mostly in the left forearm laterally.  He says that it does radiate from about the elbow to the fifth finger, left.  Again it is more of a tingling sensation as opposed to a loss of sensation.  He denies weakness.  No trouble opening jars or decreased hand strength.  He has not noted any weakness in the arm more proximally.  He does feel that having his elbow bent or raising his arm to shoulder height will trigger the tingling sensation.  He denies experiencing any bowel or bladder changes since the symptoms have started.  No weakness or numbness in the legs or lower extremities.  No specific gait changes.  He is still in pain management.  No head or neck injury however he does note a back injury a few years ago which he says initiated the symptoms of pain in his lower back.  This was a work-related injury he says and is in the process of applying for SSI.  He has no symptoms on the right side.  Most recent imaging is a CT of his cervical spine this was in December.  It notes \"degenerative changes\".    The following portions of the patient's history were reviewed and updated as appropriate: allergies, current medications, past family history, past medical history, past social history, past surgical history and problem list.    Review of Systems   Constitutional:  Positive for " fatigue.   Neurological:  Positive for numbness (upper extremities since december). Negative for dizziness, tremors, seizures, syncope, facial asymmetry, speech difficulty, weakness, light-headedness and headaches.   Psychiatric/Behavioral:  Negative for agitation, behavioral problems, confusion, decreased concentration, dysphoric mood, hallucinations, self-injury, sleep disturbance and suicidal ideas. The patient is nervous/anxious. The patient is not hyperactive.       I have reviewed the review of systems above performed by my medical assistant.      Vitals:    03/18/24 0845   BP: 132/84   Pulse: 66   SpO2: 98%       Neurologic Exam     Mental Status   Oriented to person, place, and time.   Registration: recalls 3 of 3 objects. Follows 3 step commands.   Attention: normal. Concentration: normal.   Speech: speech is normal   Level of consciousness: alert  Knowledge: consistent with education (No deficits found.).   Normal comprehension.     Cranial Nerves     CN II   Visual fields full to confrontation.     CN III, IV, VI   Pupils are equal, round, and reactive to light.  Extraocular motions are normal.   CN III: no CN III palsy  CN VI: no CN VI palsy  Nystagmus: none   Diplopia: none    CN V   Facial sensation intact.     CN VII   Facial expression full, symmetric.     CN VIII   CN VIII normal.     CN IX, X   CN IX normal.   CN X normal.     CN XI   CN XI normal.     CN XII   CN XII normal.     Motor Exam   Muscle bulk: normal  Right arm tone: normal  Left arm tone: normal  Right leg tone: normal  Left leg tone: normal    Strength   Right neck flexion: 5/5  Left neck flexion: 5/5  Right neck extension: 5/5  Left neck extension: 5/5  Right deltoid: 5/5  Left deltoid: 5/5  Right biceps: 5/5  Left biceps: 5/5  Right triceps: 5/5  Left triceps: 5/5  Right wrist flexion: 5/5  Left wrist flexion: 5/5  Right wrist extension: 5/5  Left wrist extension: 5/5  Right interossei: 5/5  Left interossei: 5/5  Right abdominals:  5/5  Left abdominals: 5/5  Right iliopsoas: 5/5  Left iliopsoas: 5/5  Right quadriceps: 5/5  Left quadriceps: 5/5  Right hamstrin/5  Left hamstrin/5  Right glutei: 5/5  Left glutei: 5/5  Right anterior tibial: 5/5  Left anterior tibial: 5/5  Right posterior tibial: 5/5  Left posterior tibial: 5/5  Right peroneal: 5/5  Left peroneal: 5/5  Right gastroc: 5/5  Left gastroc: 5/5    Sensory Exam   Light touch normal.   Vibration normal.   Proprioception normal.   Pinprick normal.     Gait, Coordination, and Reflexes     Gait  Gait: normal    Coordination   Romberg: negative    Tremor   Resting tremor: absent  Intention tremor: absent    Reflexes   Right brachioradialis: 2+  Left brachioradialis: 2+  Right biceps: 2+  Left biceps: 2+  Right triceps: 2+  Left triceps: 2+  Right patellar: 2+  Left patellar: 2+  Right achilles: 2+  Left achilles: 2+  Right : 2+  Left : 2+Station is normal.       Physical Exam  Vitals reviewed.   Constitutional:       General: He is not in acute distress.     Appearance: He is well-developed.   HENT:      Head: Normocephalic and atraumatic.   Eyes:      Extraocular Movements: EOM normal.      Pupils: Pupils are equal, round, and reactive to light.   Cardiovascular:      Rate and Rhythm: Normal rate and regular rhythm.      Heart sounds: Normal heart sounds.   Pulmonary:      Effort: Pulmonary effort is normal. No respiratory distress.      Breath sounds: Normal breath sounds.   Abdominal:      General: Bowel sounds are normal. There is no distension.      Palpations: Abdomen is soft.      Tenderness: There is no abdominal tenderness.   Musculoskeletal:         General: No deformity.      Cervical back: Normal range of motion.   Skin:     General: Skin is warm.      Findings: No rash.   Neurological:      Mental Status: He is oriented to person, place, and time.      Coordination: Romberg Test normal.      Gait: Gait is intact.      Deep Tendon Reflexes:      Reflex Scores:        Tricep reflexes are 2+ on the right side and 2+ on the left side.       Bicep reflexes are 2+ on the right side and 2+ on the left side.       Brachioradialis reflexes are 2+ on the right side and 2+ on the left side.       Patellar reflexes are 2+ on the right side and 2+ on the left side.       Achilles reflexes are 2+ on the right side and 2+ on the left side.  Psychiatric:         Speech: Speech normal.         Judgment: Judgment normal.         Procedures    Assessment/Plan: His neuroexam is nonfocal.  There are no upper motor neuron signs.  However we will check an MRI of the cervical and thoracic spine.  We will also schedule an EMG/nerve conduction study of his left upper extremity.  I do not feel the need for brain imaging currently.  He will continue the process of pain management for now.  Will see him back after his testing is been completed.       Diagnoses and all orders for this visit:    1. Numbness and tingling in left arm (Primary)  -     EMG & Nerve Conduction Test; Future    2. Chronic neck pain  -     MRI Cervical Spine With & Without Contrast; Future    3. Chronic mid back pain  -     MRI Thoracic Spine With & Without Contrast; Future           Martín Gandhi II, MD

## 2024-03-19 ENCOUNTER — HOSPITAL ENCOUNTER (OUTPATIENT)
Dept: GENERAL RADIOLOGY | Facility: HOSPITAL | Age: 44
Discharge: HOME | End: 2024-03-19
Admitting: NURSE PRACTITIONER
Payer: COMMERCIAL

## 2024-03-19 DIAGNOSIS — K21.9 GASTROESOPHAGEAL REFLUX DISEASE, UNSPECIFIED WHETHER ESOPHAGITIS PRESENT: ICD-10-CM

## 2024-03-19 DIAGNOSIS — R13.10 DYSPHAGIA, UNSPECIFIED TYPE: ICD-10-CM

## 2024-03-19 PROCEDURE — 74221 X-RAY XM ESOPHAGUS 2CNTRST: CPT

## 2024-03-19 RX ADMIN — BARIUM SULFATE 135 ML: 980 POWDER, FOR SUSPENSION ORAL at 09:13

## 2024-03-19 RX ADMIN — ANTACID/ANTIFLATULENT 1 PACKET: 380; 550; 10; 10 GRANULE, EFFERVESCENT ORAL at 09:13

## 2024-03-19 RX ADMIN — BARIUM SULFATE 700 MG: 700 TABLET ORAL at 09:13

## 2024-03-19 RX ADMIN — BARIUM SULFATE 183 ML: 960 POWDER, FOR SUSPENSION ORAL at 09:13

## 2024-03-19 NOTE — PROGRESS NOTES
Please inform the patient esophagram shows a hiatal hernia and a moderate amount of reflux.  Continue twice daily dosing Aciphex.  Would add EGD to upcoming colonoscopy if he is agreeable.  Please modify case request and inform Jessi so she can add procedure to endoscopy scheduling.  Thanks Flory
This is a 85yr old male patient admitted for Pneumonia, presenting with Pneumonia, presenting with the following:  -There is a DTI to the L. Hip (0.3cm x 0.3cm) with epidermal separation, red tissue, and scant drainage  -There is a Stage 2 Pressure Injury to the Coccyx (!cm x 0.5cm) with red tissue and scant drainage

## 2024-03-20 ENCOUNTER — HOSPITAL ENCOUNTER (OUTPATIENT)
Dept: ULTRASOUND IMAGING | Facility: HOSPITAL | Age: 44
Discharge: HOME | End: 2024-03-20
Payer: COMMERCIAL

## 2024-03-20 ENCOUNTER — TELEPHONE (OUTPATIENT)
Dept: GASTROENTEROLOGY | Facility: CLINIC | Age: 44
End: 2024-03-20
Payer: COMMERCIAL

## 2024-03-20 DIAGNOSIS — N50.811 RIGHT TESTICULAR PAIN: ICD-10-CM

## 2024-03-20 PROCEDURE — 93976 VASCULAR STUDY: CPT

## 2024-03-20 PROCEDURE — 76870 US EXAM SCROTUM: CPT

## 2024-03-20 NOTE — TELEPHONE ENCOUNTER
Call to patient per Flory results and recommendations.   Patient verbalized understanding         ----- Message from ANURADHA Mejia sent at 3/19/2024 12:37 PM EDT -----  Please inform the patient esophagram shows a hiatal hernia and a moderate amount of reflux.  Continue twice daily dosing Aciphex.  Would add EGD to upcoming colonoscopy if he is agreeable.  Please modify case request and inform Jessi so she can add p  billie to endoscopy scheduling.  Thanks Flory

## 2024-03-21 ENCOUNTER — OFFICE VISIT (OUTPATIENT)
Dept: INTERNAL MEDICINE | Facility: CLINIC | Age: 44
End: 2024-03-21
Payer: COMMERCIAL

## 2024-03-21 ENCOUNTER — PREP FOR SURGERY (OUTPATIENT)
Dept: OTHER | Facility: HOSPITAL | Age: 44
End: 2024-03-21
Payer: COMMERCIAL

## 2024-03-21 VITALS
HEART RATE: 67 BPM | BODY MASS INDEX: 33.18 KG/M2 | SYSTOLIC BLOOD PRESSURE: 130 MMHG | DIASTOLIC BLOOD PRESSURE: 76 MMHG | HEIGHT: 71 IN | OXYGEN SATURATION: 98 % | WEIGHT: 237 LBS | RESPIRATION RATE: 18 BRPM

## 2024-03-21 DIAGNOSIS — M54.6 ACUTE BILATERAL THORACIC BACK PAIN: Primary | ICD-10-CM

## 2024-03-21 PROCEDURE — 3075F SYST BP GE 130 - 139MM HG: CPT | Performed by: NURSE PRACTITIONER

## 2024-03-21 PROCEDURE — 99213 OFFICE O/P EST LOW 20 MIN: CPT | Performed by: NURSE PRACTITIONER

## 2024-03-21 PROCEDURE — 1160F RVW MEDS BY RX/DR IN RCRD: CPT | Performed by: NURSE PRACTITIONER

## 2024-03-21 PROCEDURE — 1159F MED LIST DOCD IN RCRD: CPT | Performed by: NURSE PRACTITIONER

## 2024-03-21 PROCEDURE — 3078F DIAST BP <80 MM HG: CPT | Performed by: NURSE PRACTITIONER

## 2024-03-21 RX ORDER — HYDROCODONE BITARTRATE AND ACETAMINOPHEN 5; 325 MG/1; MG/1
1 TABLET ORAL EVERY 6 HOURS PRN
Qty: 12 TABLET | Refills: 0 | Status: SHIPPED | OUTPATIENT
Start: 2024-03-21

## 2024-03-21 NOTE — PROGRESS NOTES
Subjective   Arsenio Beard is a 43 y.o. male.   Chief Complaint   Patient presents with    Back Pain     X 1-2 months NKI    Answers submitted by the patient for this visit:  Primary Reason for Visit (Submitted on 3/19/2024)  What is the primary reason for your visit?: Back Pain    Vitals:    03/21/24 0937   BP: 130/76   Pulse: 67   Resp: 18   SpO2: 98%     No LMP for male patient.    History of Present Illness  Arsenio is a 43 year old male patient who is here for an acute visit. He c/o thoracic back pain and neck pain for one month. He denies any known injury. He sees [facility] pain management for lumbar back pain.   Back Pain  This is a new problem. The current episode started 1 to 4 weeks ago. The problem occurs constantly. The problem has been worsening since onset. The pain is present in the thoracic spine. The quality of the pain is described as aching. The pain does not radiate. The pain is at a severity of 6/10. The pain is The same all the time. The symptoms are aggravated by bending, position, sitting, standing and twisting. Stiffness is present All day. Pertinent negatives include no abdominal pain, bladder incontinence, bowel incontinence, chest pain, dysuria, fever, leg pain, numbness, paresthesias, pelvic pain, perianal numbness, tingling, weakness or weight loss. Risk factors include history of steroid use, lack of exercise, obesity and sedentary lifestyle. He has tried bed rest, heat and NSAIDs for the symptoms. The treatment provided no relief.      The following data was reviewed by: ANURADHA Klein on 03/21/2024:    Data reviewed : Consultant notes neurology and pain management       The following portions of the patient's history were reviewed and updated as appropriate: allergies, current medications, past family history, past medical history, past social history, past surgical history, and problem list.    Review of Systems   Constitutional:  Negative for fever and weight loss.    Respiratory: Negative.     Cardiovascular:  Negative for chest pain.   Gastrointestinal:  Negative for abdominal pain and bowel incontinence.   Genitourinary:  Negative for bladder incontinence, dysuria and pelvic pain.   Musculoskeletal:  Positive for back pain.   Neurological:  Negative for tingling, weakness, numbness and paresthesias.       Objective   Physical Exam  Vitals and nursing note reviewed.   Cardiovascular:      Rate and Rhythm: Normal rate and regular rhythm.      Heart sounds: Normal heart sounds.   Pulmonary:      Effort: Pulmonary effort is normal.      Breath sounds: Normal breath sounds.   Musculoskeletal:      Thoracic back: Tenderness present. No swelling or deformity.   Skin:     General: Skin is warm and dry.         Assessment & Plan   Diagnoses and all orders for this visit:    1. Acute bilateral thoracic back pain (Primary)  -     HYDROcodone-acetaminophen (NORCO) 5-325 MG per tablet; Take 1 tablet by mouth Every 6 (Six) Hours As Needed for Severe Pain.  Dispense: 12 tablet; Refill: 0      Offered PT but patient declined. He states it hasn't worked well for him in the past. He saw neurology three days ago and MRI of the neck and thoracic spine were ordered along with EMG/NCS.   He can continue celebrex. I gave him a short term rx for hydrocodone to take for severe pain only. His pain management has prescribed topical but he states he cannot reach his back to apply it   Will await results of thoracic MRI . May need to refer to neurosurgery or back to pain management   Follow up if symptoms persist, worsen or new symptoms develop

## 2024-03-22 ENCOUNTER — TELEPHONE (OUTPATIENT)
Dept: INTERNAL MEDICINE | Facility: CLINIC | Age: 44
End: 2024-03-22
Payer: COMMERCIAL

## 2024-03-22 NOTE — PROGRESS NOTES
Patient: Arsenio Beard  YOB: 1980  Date of Service: 3/22/2024    Chief Complaints: Right knee pain    Subjective:    History of Present Illness: Pt is seen in the office today with complaints of right knee pain   he had a small mass removed previously by Dr. Abdulkadir aHm.  He continues to have pain in the area of the quadriceps tendon and mostly laterally over his IT band.  Differential was IT band syndrome versus lateral meniscus I did put him in a patellar tendon strap over the IT band and put him on a good stretching and strengthening program his symptoms have persisted      Allergies: No Known Allergies    Medications:   Home Medications:  Current Outpatient Medications on File Prior to Visit   Medication Sig    amantadine (SYMMETREL) 100 MG tablet     celecoxib (CeleBREX) 200 MG capsule Take 1 capsule by mouth Daily.    DULoxetine (CYMBALTA) 20 MG capsule     HYDROcodone-acetaminophen (NORCO) 5-325 MG per tablet Take 1 tablet by mouth Every 6 (Six) Hours As Needed for Severe Pain.    lamoTRIgine (LaMICtal) 25 MG tablet     nadolol (CORGARD) 20 MG tablet TAKE ONE TABLET BY MOUTH DAILY    RABEprazole (ACIPHEX) 20 MG EC tablet Take 1 tablet by mouth 2 (Two) Times a Day.    terbinafine (lamiSIL) 250 MG tablet      No current facility-administered medications on file prior to visit.     Current Medications:  Scheduled Meds:  Continuous Infusions:No current facility-administered medications for this visit.    PRN Meds:.    I have reviewed the patient's medical history in detail and updated the computerized patient record.  Review and summarization of old records include:    Past Medical History:   Diagnosis Date    At risk for sleep apnea     SLEEP STUDY WAS NEGATIVE    Depression 06/1982    Difficulty swallowing     GERD (gastroesophageal reflux disease)     Hernia 02/02/2021    Repaired on 02/23/2021    History of COVID-19     11/2020    History of staph infection 2008    ON BACK     Hypertension     Kidney cysts     Low back pain     Mass of knee     RIGHT    Obesity 06/2003    Right groin pain     Seasonal allergies     Visual impairment 01/1986        Past Surgical History:   Procedure Laterality Date    ENDOSCOPY N/A 03/16/2022    Procedure: ESOPHAGOGASTRODUODENOSCOPY WITH BIOPSIES;  Surgeon: Chandu Eric MD;  Location: [facility] ENDOSCOPY;  Service: Gastroenterology;  Laterality: N/A;  PRE OP - DYSPHAGIA  POST OP - GASTRITIS    EXCISION MASS LEG Right 10/27/2021    Procedure: MASS SOFT TISSUE EXCISION rIGHT KNEE MASS;  Surgeon: Abdulkadir Ham MD;  Location: [facility] MAIN OR;  Service: Orthopedics;  Laterality: Right;    FACIAL RECONSTRUCTION SURGERY      as a child    INGUINAL HERNIA REPAIR Right 02/23/2021    Procedure: right INGUINAL HERNIA REPAIR LAPAROSCOPIC WITH DAVINCI ROBOT;  Surgeon: Hiram Ocampo MD;  Location: [facility] OR;  Service: [facility];  Laterality: Right;    INGUINAL HERNIA REPAIR  02/23/2021        Social History     Occupational History    Not on file   Tobacco Use    Smoking status: Never     Passive exposure: Never    Smokeless tobacco: Never   Vaping Use    Vaping status: Never Used   Substance and Sexual Activity    Alcohol use: Never    Drug use: Never     Comment: STEROID USE X3 YEARS/0805-6655    Sexual activity: Not Currently     Partners: Female     Birth control/protection: None     Comment: Am a [employer]. Abstained from sexual intimacy since 2006      Social History     Social History Narrative    Not on file        Family History   Adopted: Yes   Problem Relation Age of Onset    Heart disease Maternal Grandmother     Heart attack Maternal Grandmother     Malig Hyperthermia Neg Hx        ROS: 14 point review of systems was performed and was negative except for documented findings in HPI and today's encounter.     Allergies: No Known Allergies  Constitutional:  Denies fever, shaking or chills   Eyes:  Denies change in visual acuity   HENT:  Denies  nasal congestion or sore throat   Respiratory:  Denies cough or shortness of breath   Cardiovascular:  Denies chest pain or severe LE edema   GI:  Denies abdominal pain, nausea, vomiting, bloody stools or diarrhea   Musculoskeletal:  Numbness, tingling, or loss of motor function only as noted above in history of present illness.  : Denies painful urination or hematuria  Integument:  Denies rash, lesion or ulceration   Neurologic:  Denies headache or focal weakness  Endocrine:  Denies lymphadenopathy  Psych:  Denies confusion or change in mental status   Hem:  Denies active bleeding      Physical Exam: 43 y.o. male  Wt Readings from Last 3 Encounters:   03/21/24 108 kg (237 lb)   03/18/24 107 kg (236 lb)   03/07/24 108 kg (237 lb)       There is no height or weight on file to calculate BMI.    There were no vitals filed for this visit.  Vital signs reviewed.   General Appearance:    Alert, cooperative, in no acute distress                    Ortho exam    Physical exam of the right knee reveals no effusion no redness.  The patient does have tenderness about the lateral joint line.  No tenderness about the medial joint line.  A negative bounce home and a positive lateral Doreen.    Patient has a stable ligamentous exam.  The patient has a negative Lachman and negative anterior drawer and a negative pivot shift.  Quads are reasonable and symmetric bilaterally.  Calf is soft and nontender.  There is no overlying skin changes no lymphedema lymphadenopathy.  Patient has good hip range of motion full symmetric and asymptomatic and a normal ankle exam              Assessment: Persistent right knee pain despite conservative measures this is either IT band versus lateral meniscus plan is to proceed with an MRI    Plan:   Follow up as indicated.  Ice, elevate, and rest as needed.  Discussed conservative measures of pain control including ice, bracing.    Karen Resendiz M.D.

## 2024-03-22 NOTE — TELEPHONE ENCOUNTER
----- Message from ANURADHA Negron sent at 3/22/2024  2:39 PM EDT -----  Please notify mimi that US showed normal testicles, there were small bilateral hydroceles which is a fluid filled sac around the testicle.   There is a small cyst noted. He had a consult with urology already

## 2024-03-25 ENCOUNTER — OFFICE VISIT (OUTPATIENT)
Dept: ORTHOPEDIC SURGERY | Facility: CLINIC | Age: 44
End: 2024-03-25
Payer: COMMERCIAL

## 2024-03-25 VITALS — TEMPERATURE: 98.2 F | WEIGHT: 239.6 LBS | BODY MASS INDEX: 32.45 KG/M2 | HEIGHT: 72 IN

## 2024-03-25 DIAGNOSIS — S83.281D TEAR OF LATERAL MENISCUS OF RIGHT KNEE, CURRENT, UNSPECIFIED TEAR TYPE, SUBSEQUENT ENCOUNTER: Primary | ICD-10-CM

## 2024-03-25 PROCEDURE — 1160F RVW MEDS BY RX/DR IN RCRD: CPT | Performed by: ORTHOPAEDIC SURGERY

## 2024-03-25 PROCEDURE — 99213 OFFICE O/P EST LOW 20 MIN: CPT | Performed by: ORTHOPAEDIC SURGERY

## 2024-03-25 PROCEDURE — 1159F MED LIST DOCD IN RCRD: CPT | Performed by: ORTHOPAEDIC SURGERY

## 2024-04-08 NOTE — TELEPHONE ENCOUNTER
CALLED [facility] COMMUNICATED MESSAGE TO THE PT TO LET DR SYED KNOW THE DAY OF PROCEDURE HE WILL BE HAVING AN EGD AS WELL.PT EXPRESSED AND VERBALIZED UNDERSTANDING.OK FOR THE HUB TO RELAY

## 2024-04-12 ENCOUNTER — HOSPITAL ENCOUNTER (OUTPATIENT)
Dept: MRI IMAGING | Facility: HOSPITAL | Age: 44
Discharge: HOME | End: 2024-04-12
Payer: COMMERCIAL

## 2024-04-12 DIAGNOSIS — S83.281D TEAR OF LATERAL MENISCUS OF RIGHT KNEE, CURRENT, UNSPECIFIED TEAR TYPE, SUBSEQUENT ENCOUNTER: ICD-10-CM

## 2024-04-12 PROCEDURE — 73721 MRI JNT OF LWR EXTRE W/O DYE: CPT

## 2024-04-15 ENCOUNTER — TELEPHONE (OUTPATIENT)
Dept: ORTHOPEDIC SURGERY | Facility: CLINIC | Age: 44
End: 2024-04-15
Payer: COMMERCIAL

## 2024-04-15 NOTE — TELEPHONE ENCOUNTER
----- Message from Karen Resendiz MD sent at 4/15/2024  8:33 AM EDT -----  Please tell him his MRI of his hip looks good which makes me feel much better I think we should now also MRI his knee if he is okay with

## 2024-04-16 ENCOUNTER — TELEPHONE (OUTPATIENT)
Dept: ORTHOPEDIC SURGERY | Facility: CLINIC | Age: 44
End: 2024-04-16
Payer: COMMERCIAL

## 2024-04-16 DIAGNOSIS — S83.281D ACUTE LATERAL MENISCUS TEAR OF RIGHT KNEE, SUBSEQUENT ENCOUNTER: Primary | ICD-10-CM

## 2024-04-16 NOTE — TELEPHONE ENCOUNTER
From diff encounter, just not sure you saw.    Patient would like to proceed with knee mri as you recommended.

## 2024-05-07 ENCOUNTER — TELEPHONE (OUTPATIENT)
Dept: GASTROENTEROLOGY | Facility: CLINIC | Age: 44
End: 2024-05-07
Payer: COMMERCIAL

## 2024-05-08 ENCOUNTER — TELEPHONE (OUTPATIENT)
Dept: GASTROENTEROLOGY | Facility: CLINIC | Age: 44
End: 2024-05-08
Payer: COMMERCIAL

## 2024-05-21 ENCOUNTER — HOSPITAL ENCOUNTER (OUTPATIENT)
Dept: MRI IMAGING | Facility: HOSPITAL | Age: 44
Discharge: HOME | End: 2024-05-21
Admitting: ORTHOPAEDIC SURGERY
Payer: COMMERCIAL

## 2024-05-21 DIAGNOSIS — S83.281D ACUTE LATERAL MENISCUS TEAR OF RIGHT KNEE, SUBSEQUENT ENCOUNTER: ICD-10-CM

## 2024-05-21 PROCEDURE — 73721 MRI JNT OF LWR EXTRE W/O DYE: CPT

## 2024-05-23 ENCOUNTER — TELEPHONE (OUTPATIENT)
Dept: ORTHOPEDIC SURGERY | Facility: CLINIC | Age: 44
End: 2024-05-23
Payer: COMMERCIAL

## 2024-05-23 NOTE — TELEPHONE ENCOUNTER
Results from Dr. Resendiz:    Please tell him his MRI really looks pretty good he is got some arthritis behind the kneecap that I think is a large part of his symptoms no big meniscus tear I would like him to follow-up so we can go through it and reexamine him side at next steps

## 2024-05-31 ENCOUNTER — HOSPITAL ENCOUNTER (OUTPATIENT)
Dept: INFUSION THERAPY | Facility: HOSPITAL | Age: 44
Discharge: HOME | End: 2024-05-31
Payer: COMMERCIAL

## 2024-05-31 DIAGNOSIS — R20.0 NUMBNESS AND TINGLING IN LEFT ARM: ICD-10-CM

## 2024-05-31 DIAGNOSIS — R20.2 NUMBNESS AND TINGLING IN LEFT ARM: ICD-10-CM

## 2024-05-31 PROCEDURE — 95910 NRV CNDJ TEST 7-8 STUDIES: CPT

## 2024-05-31 PROCEDURE — 95886 MUSC TEST DONE W/N TEST COMP: CPT

## 2024-05-31 PROCEDURE — 95886 MUSC TEST DONE W/N TEST COMP: CPT | Performed by: PSYCHIATRY & NEUROLOGY

## 2024-05-31 PROCEDURE — 95909 NRV CNDJ TST 5-6 STUDIES: CPT | Performed by: PSYCHIATRY & NEUROLOGY
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
## 2024-06-03 ENCOUNTER — TELEPHONE (OUTPATIENT)
Dept: NEUROLOGY | Facility: CLINIC | Age: 44
End: 2024-06-03
Payer: COMMERCIAL

## 2024-06-03 NOTE — TELEPHONE ENCOUNTER
CASEYM to reschedule his appt on 6/10. Please send to scheduling to reschedule please.    Also, need to know if he's had his MRI's completed that were ordered by Dr. Gandhi. IF he has not, he can reschedule after having those completed first. He can call [phone] to schedule then call our office to schedule his follow up a wk or two after they are completed.

## 2024-06-04 ENCOUNTER — TELEPHONE (OUTPATIENT)
Dept: INTERNAL MEDICINE | Facility: CLINIC | Age: 44
End: 2024-06-04
Payer: COMMERCIAL

## 2024-06-04 DIAGNOSIS — M54.6 ACUTE BILATERAL THORACIC BACK PAIN: ICD-10-CM

## 2024-06-04 RX ORDER — HYDROCODONE BITARTRATE AND ACETAMINOPHEN 5; 325 MG/1; MG/1
1 TABLET ORAL EVERY 6 HOURS PRN
Qty: 12 TABLET | Refills: 0 | Status: SHIPPED | OUTPATIENT
Start: 2024-06-04

## 2024-06-04 NOTE — TELEPHONE ENCOUNTER
----- Message from Britany REYNAGA sent at 6/4/2024  1:49 PM EDT -----  Regarding: FW: Arsenio Beard  Contact: [phone]    ----- Message -----  From: Arsenio Beard  Sent: 6/4/2024   1:41 PM EDT  To: [facility]  Subject: Arsenio Beard                                  Are you able to send a refill to the [facility] I use for the Hydrocodone you prescribed? My back has consistently been in a lot of pain. Especially since I put all my belongings into a storage unit this past Friday.

## 2024-06-04 NOTE — TELEPHONE ENCOUNTER
Rx for hydrocodone for 3 days only sent to his pharmacy  PDMP reviewed and is appropriate  Pt advised to follow up if no improvement

## 2024-06-18 ENCOUNTER — TELEPHONE (OUTPATIENT)
Dept: NEUROLOGY | Facility: CLINIC | Age: 44
End: 2024-06-18
Payer: COMMERCIAL

## 2024-06-18 NOTE — TELEPHONE ENCOUNTER
LVM to pt in regard to new location of the [facility] Neurology clinic and Patient message (If applicable)

## 2024-06-25 ENCOUNTER — OFFICE VISIT (OUTPATIENT)
Dept: NEUROLOGY | Facility: CLINIC | Age: 44
End: 2024-06-25
Payer: COMMERCIAL

## 2024-06-25 VITALS
SYSTOLIC BLOOD PRESSURE: 126 MMHG | RESPIRATION RATE: 20 BRPM | OXYGEN SATURATION: 97 % | DIASTOLIC BLOOD PRESSURE: 82 MMHG | WEIGHT: 240 LBS | BODY MASS INDEX: 33.6 KG/M2 | HEART RATE: 85 BPM | HEIGHT: 71 IN

## 2024-06-25 DIAGNOSIS — G89.29 CHRONIC NECK PAIN: ICD-10-CM

## 2024-06-25 DIAGNOSIS — R20.8 BURNING SENSATION: ICD-10-CM

## 2024-06-25 DIAGNOSIS — M54.2 CHRONIC NECK PAIN: ICD-10-CM

## 2024-06-25 DIAGNOSIS — G89.29 CHRONIC MID BACK PAIN: ICD-10-CM

## 2024-06-25 DIAGNOSIS — M54.12 CERVICAL RADICULOPATHY: Primary | ICD-10-CM

## 2024-06-25 DIAGNOSIS — M54.9 CHRONIC MID BACK PAIN: ICD-10-CM

## 2024-06-25 PROCEDURE — 1159F MED LIST DOCD IN RCRD: CPT

## 2024-06-25 PROCEDURE — 3074F SYST BP LT 130 MM HG: CPT

## 2024-06-25 PROCEDURE — 1160F RVW MEDS BY RX/DR IN RCRD: CPT

## 2024-06-25 PROCEDURE — 99214 OFFICE O/P EST MOD 30 MIN: CPT

## 2024-06-25 PROCEDURE — 3079F DIAST BP 80-89 MM HG: CPT

## 2024-06-25 NOTE — PROGRESS NOTES
DOS: 2024  NAME: Arsenio Beard   : 1980  PCP: Michelle Medina APRN    Chief Complaint   Patient presents with    Numbness and tingling in left arm      SUBJECTIVE  Neurological Problem:  43 y.o. RHW male with a past medical history of HTN, GERD, chronic neck pain, chronic back pain. They are seen in follow up today for left arm numbness and tingling, however the problem is new to the examiner. Patient last seen by Dr. Martín Gandhi in 2024, with a summary of the history taken from the previous note with additions/modifications as indicated. He is unaccompanied.    Interval History:   Mr. Beard initially presented to our clinic in 2024 for the evaluation of left arm numbness and tingling, seen by Dr. Gandhi.  Per review of chart he reported a history of chronic low back pain since , he was being followed by pain management.  He noted a few months prior to presentation more mid back pain and neck pain that was intermittent.  He also noted numbness and tingling of the left arm.  He was not experiencing weakness.  He noted having his elbow bent or raising his arm to shoulder height would trigger the tingling.  He denied any significant injury to his head or neck however he did experience a back injury in  after picking up equipment off of a truck bed.  CT cervical spine was completed in 2023 and showed degenerative changes.  He was sent to physical therapy by his PCP without much change.  Dr. Gandhi ordered MRI cervical and thoracic spine as well as EMG/nerve conduction test for further evaluation.    Patient presents today in follow-up and reports he is still experiencing the tingling sensations to his left upper extremity, felt shoulder to wrist with burning sensations around his elbow and slightly proximal to that area.  He also feels pain from his mid back up to his skull.  He again denies any weakness, no trouble with opening jars or loss of .  He does  not feel anything on his right upper extremity.  He denies headache or dizziness.  He says he was evaluated by [facility] in the past for his lower back and was told it was nonsurgical.  He has had an L2-L4 ablation with plans for an L4 to the lower spine ablation pain regiment, he sees Dr. Corrales and Dr. Merino.  He was unable to have MRI cervical and thoracic spine done due to insurance unfortunately.  EMG/NCS was completed with Dr. Altman in May and showed an abnormal study and moderate left ulnar neuropathy at the elbow, no evidence of a right ulnar neuropathy nor evidence of a left median disease, brachial plexopathy or cervical radiculopathy per the study.  He denies any focal or unilateral symptoms, no visual or speech deficit, no weakness or numbness to one side of the body.    Review of Systems   Musculoskeletal:  Negative for gait problem.   Neurological:  Positive for numbness. Negative for dizziness, tremors, seizures, syncope, facial asymmetry, speech difficulty, weakness, light-headedness and headaches.   Psychiatric/Behavioral:  Negative for agitation, behavioral problems, confusion, decreased concentration, dysphoric mood, hallucinations, self-injury, sleep disturbance and suicidal ideas. The patient is not nervous/anxious and is not hyperactive.         The following portions of the patient's history were reviewed and updated as appropriate: allergies, current medications, past family history, past medical history, past social history, past surgical history and problem list.    Current Medications:   Current Outpatient Medications:     amantadine (SYMMETREL) 100 MG tablet, , Disp: , Rfl:     celecoxib (CeleBREX) 200 MG capsule, Take 1 capsule by mouth Daily., Disp: , Rfl:     DULoxetine (CYMBALTA) 20 MG capsule, , Disp: , Rfl:     HYDROcodone-acetaminophen (NORCO) 5-325 MG per tablet, Take 1 tablet by mouth Every 6 (Six) Hours As Needed for Severe Pain., Disp: 12 tablet, Rfl: 0    lamoTRIgine  "(LaMICtal) 25 MG tablet, , Disp: , Rfl:     nadolol (CORGARD) 20 MG tablet, TAKE ONE TABLET BY MOUTH DAILY, Disp: 90 tablet, Rfl: 3    oxaprozin (DAYPRO) 600 MG tablet, Take 2 tablets by mouth Daily., Disp: , Rfl:     RABEprazole (ACIPHEX) 20 MG EC tablet, Take 1 tablet by mouth 2 (Two) Times a Day., Disp: 180 tablet, Rfl: 3    terbinafine (lamiSIL) 250 MG tablet, , Disp: , Rfl:     Ibuprofen 3 %, Gabapentin 10 %, Baclofen 2 %, lidocaine 4 %, Ketamine HCl 4 %, Apply 1-2 g topically to the appropriate area as directed 3 (Three) to 4 (Four) times daily., Disp: 90 g, Rfl: 2  **I did not stop or change the above medications.  Patient's medication list was updated to reflect medications they have reported as currently taking, including medication changes made by other providers.    Objective   Vital Signs:  /82   Pulse 85   Resp 20   Ht 181.6 cm (71.5\")   Wt 109 kg (240 lb)   SpO2 97%   BMI 33.01 kg/m²   Body mass index is 33.01 kg/m².    Physical Exam   Physical Exam:  GENERAL: NAD, alert  HEENT: Normocephalic, atraumatic   Resp: Even and unlabored  Extremities: No edema  Skin: Warm and dry  Psychiatric: Normal mood and affect    Neurological:   MS: AOx3, recent/remote memory intact, normal attention/concentration, language intact, no neglect   CN: visual acuity grossly normal, PERRL, EOMI, no nystagmus, no facial droop, no dysarthria  Motor: Normal tone. No tremor or abnormal movements noted. No asterixis.   Trapezius elevation: 5/5  Shoulder abductors 5/5  Elbow flexors 5/5  Elbow extensors 5/5   Left  2+  Right  2+  Sensory: Intact to crude touch in both arms, decreased light touch in LUE. Cold temperature intact but sharper in LUE. Vibration intact in both arms. Pinprick sensation decreased in LUE palmar surface to wrist.   Coordination: No dysmetria finger to nose   Rapid alternating movements: Normal finger to thumb tap  Gait and station: Normal gait and station    Reflexes   Right " "brachioradialis: 2+  Left brachioradialis: 2+  Right biceps: 2+  Left biceps: 2+  Right triceps: 2+  Left triceps: 2+    Result Review :  The following data was reviewed by: ANURADHA Lacy on 06/25/2024:  Laboratory Results:         Lab Results   Component Value Date    WBC 6.43 12/09/2023    HGB 13.5 12/09/2023    HCT 39.0 12/09/2023    MCV 88.2 12/09/2023     12/09/2023     Lab Results   Component Value Date    GLUCOSE 138 (H) 12/09/2023    BUN 14 12/09/2023    CREATININE 1.01 12/09/2023    EGFRIFNONA 87 10/26/2021    EGFRIFAFRI 85 09/14/2021    BCR 13.9 12/09/2023    K 3.4 (L) 12/09/2023    CO2 24.8 12/09/2023    CALCIUM 8.2 (L) 12/09/2023    PROTENTOTREF 7.0 10/25/2023    ALBUMIN 4.5 12/08/2023    LABIL2 2.0 10/25/2023    AST 17 12/08/2023    ALT 27 12/08/2023     Lab Results   Component Value Date    HGBA1C 5.50 10/25/2023     Lab Results   Component Value Date    CHOL 141 12/08/2023     Lab Results   Component Value Date    HDL 33 (L) 12/08/2023    HDL 37 (L) 10/25/2023    HDL 43 10/20/2022     Lab Results   Component Value Date    LDL 90 12/08/2023    LDL 91 10/25/2023     (H) 10/20/2022     Lab Results   Component Value Date    TRIG 98 12/08/2023    TRIG 99 10/25/2023    TRIG 116 10/20/2022     No results found for: \"RPR\"  Lab Results   Component Value Date    TSH 1.080 10/25/2023     No results found for: \"EVTEWEAY84\"    Data reviewed : Radiologic studies           Assessment and Plan   Diagnoses and all orders for this visit:    1. Cervical radiculopathy (Primary)  -     MRI Cervical Spine With & Without Contrast; Future  -     MRI Thoracic Spine With & Without Contrast; Future    2. Chronic neck pain    3. Chronic mid back pain    4. Burning sensation  -     Ibuprofen 3 %, Gabapentin 10 %, Baclofen 2 %, lidocaine 4 %, Ketamine HCl 4 %; Apply 1-2 g topically to the appropriate area as directed 3 (Three) to 4 (Four) times daily.  Dispense: 90 g; Refill: 2  -     MRI Cervical Spine With " & Without Contrast; Future  -     MRI Thoracic Spine With & Without Contrast; Future      I would like for Arsenio to have the MRIs of his cervical and thoracic spine done to further evaluate his symptoms especially as the EMG only showed a moderate left ulnar neuropathy at the elbow.  He has completed physical therapy without much change.  For his burning sensation of his left upper extremity I have sent in a neuropathic compound cream    We will see Arsenio back after MRIs are done, sooner if symptoms warrant.     ANURADHA Lacy  [facility] [place] [facility]   06/25/24       Follow Up   No follow-ups on file.  Patient was given instructions and counseling regarding his condition or for health maintenance advice. Please see specific information pulled into the AVS if appropriate.       Dictated using Dragon Dictation

## 2024-08-20 ENCOUNTER — TELEPHONE (OUTPATIENT)
Dept: INTERNAL MEDICINE | Facility: CLINIC | Age: 44
End: 2024-08-20
Payer: COMMERCIAL

## 2024-08-20 ENCOUNTER — TELEPHONE (OUTPATIENT)
Dept: GASTROENTEROLOGY | Facility: CLINIC | Age: 44
End: 2024-08-20
Payer: COMMERCIAL

## 2024-08-20 DIAGNOSIS — J34.2 DEVIATED SEPTUM: Primary | ICD-10-CM

## 2024-08-20 NOTE — TELEPHONE ENCOUNTER
----- Message from [facility] [employer] sent at 8/20/2024  9:48 AM EDT -----  Regarding: Appointment Request  Contact: [phone]  Appointment Request From: Arsenio Beard    With Provider: Flory Eng [[facility]]    Preferred Date Range: Any    Preferred Times: Any Time    Reason for visit: Follow-up    Comments:  I need to reschedule the colonoscopy I had to cancel as well as the camera going down my throat.

## 2024-08-20 NOTE — TELEPHONE ENCOUNTER
----- Message from Selena CARUSO sent at 8/20/2024 10:17 AM EDT -----  Regarding: FW: Arseniobecky Beard  Contact: [phone]  I looked this up for you----[facility] -[phone]  ----- Message -----  From: Arsenio Beard  Sent: 8/20/2024   9:54 AM EDT  To: [facility] [place]  Subject: Arsenio Beard                                  Awhile ago I had an appointment with a doctor to have my nose broken and the left side of my nose opened up so I can breathe better. I tried several times to get this surgery but had no one who could drop me off, pick me up, and take me to the doctor the next day. I now have that. I also now live in [place], [place]. Therefore is there’s anyone in [place] you can refer me to to have this surgery done it would be super helpful. If not I’ll just have to get a hotel room in [place] so I won’t have to have someone drive back to [place] the next day.

## 2024-08-29 ENCOUNTER — PREP FOR SURGERY (OUTPATIENT)
Dept: OTHER | Facility: HOSPITAL | Age: 44
End: 2024-08-29
Payer: COMMERCIAL

## 2024-08-29 ENCOUNTER — TELEPHONE (OUTPATIENT)
Dept: GASTROENTEROLOGY | Facility: CLINIC | Age: 44
End: 2024-08-29
Payer: COMMERCIAL

## 2024-08-29 DIAGNOSIS — Z12.11 ENCOUNTER FOR SCREENING FOR MALIGNANT NEOPLASM OF COLON: Primary | ICD-10-CM

## 2024-08-29 DIAGNOSIS — K21.9 GASTROESOPHAGEAL REFLUX DISEASE: ICD-10-CM

## 2024-08-29 NOTE — TELEPHONE ENCOUNTER
OK FOR HUB TO RELAY   CALLED AND LVM FOR PT TO CALL AND PROVIDE US WITH THE NAME OF HIS CARDIOLOGIST SO WE CAN GET A CLEARANCE TO HAVE C/S.

## 2024-08-29 NOTE — TELEPHONE ENCOUNTER
EGD AND C/S    LAST EGD 3/16/22    NO PERSONAL HX OF POLYPS    NO FAMILY HX OF POLYPS    NO FAMILY HX OF COLON CA          LIST OF  MEDICATIONS    amantadine (SYMMETREL) 100 MG tablet  celecoxib (CeleBREX) 200 MG capsule    DULoxetine (CYMBALTA) 20 MG capsule  HYDROcodone-acetaminophen (NORCO) 5-325 MG per tablet  Ibuprofen 3 %, Gabapentin 10 %, Baclofen 2 %, lidocaine 4 %, Ketamine HCl 4 %    lamoTRIgine (LaMICtal) 25 MG tablet    nadolol (CORGARD) 20 MG tablet  oxaprozin (DAYPRO) 600 MG tablet  RABEprazole (ACIPHEX) 20 MG EC tablet  terbinafine (lamiSIL) 250 MG tablet         OA QUESTIONNAIRE SCANNED IN MEDIA

## 2024-09-03 ENCOUNTER — TELEPHONE (OUTPATIENT)
Dept: GASTROENTEROLOGY | Facility: CLINIC | Age: 44
End: 2024-09-03
Payer: COMMERCIAL

## 2024-09-05 ENCOUNTER — TELEPHONE (OUTPATIENT)
Dept: GASTROENTEROLOGY | Facility: CLINIC | Age: 44
End: 2024-09-05
Payer: COMMERCIAL

## 2024-09-20 ENCOUNTER — HOSPITAL ENCOUNTER (OUTPATIENT)
Dept: MRI IMAGING | Facility: HOSPITAL | Age: 44
Discharge: HOME | End: 2024-09-20
Payer: COMMERCIAL

## 2024-09-20 DIAGNOSIS — G89.29 CHRONIC MID BACK PAIN: ICD-10-CM

## 2024-09-20 DIAGNOSIS — M54.9 CHRONIC MID BACK PAIN: ICD-10-CM

## 2024-09-20 DIAGNOSIS — G89.29 CHRONIC NECK PAIN: ICD-10-CM

## 2024-09-20 DIAGNOSIS — M54.2 CHRONIC NECK PAIN: ICD-10-CM

## 2024-09-20 PROCEDURE — 72157 MRI CHEST SPINE W/O & W/DYE: CPT

## 2024-09-20 PROCEDURE — 72156 MRI NECK SPINE W/O & W/DYE: CPT

## 2024-09-20 PROCEDURE — 0 GADOBENATE DIMEGLUMINE 529 MG/ML SOLUTION: Performed by: PSYCHIATRY & NEUROLOGY

## 2024-09-20 PROCEDURE — A9577 INJ MULTIHANCE: HCPCS | Performed by: PSYCHIATRY & NEUROLOGY

## 2024-09-20 RX ADMIN — GADOBENATE DIMEGLUMINE 20 ML: 529 INJECTION, SOLUTION INTRAVENOUS at 16:59

## 2024-12-12 RX ORDER — NADOLOL 20 MG/1
20 TABLET ORAL DAILY
Qty: 90 TABLET | Refills: 3 | OUTPATIENT
Start: 2024-12-12

## 2024-12-12 NOTE — TELEPHONE ENCOUNTER
CHERYLI -Called and spoke with patient. Patient stated that yes he established with another pcp. Advised to request medication refill from that office. Patient also stated that he might have the opportunity to move back to [place] and would like to see Michelle Medina if he is able to come back to [place].

## 2024-12-16 RX ORDER — NADOLOL 20 MG/1
20 TABLET ORAL DAILY
Qty: 90 TABLET | Refills: 3 | OUTPATIENT
Start: 2024-12-16

## 2024-12-27 RX ORDER — NADOLOL 20 MG/1
20 TABLET ORAL DAILY
Qty: 90 TABLET | Refills: 3 | OUTPATIENT
Start: 2024-12-27

## 2025-01-10 RX ORDER — RABEPRAZOLE SODIUM 20 MG/1
20 TABLET, DELAYED RELEASE ORAL 2 TIMES DAILY
Qty: 60 TABLET | OUTPATIENT
Start: 2025-01-10

## (undated) DEVICE — ARM DRAPE

## (undated) DEVICE — BNDG ADHS PLSTC 1X3IN LF

## (undated) DEVICE — SOL ISO/ALC RUB 70PCT 4OZ

## (undated) DEVICE — SEAL

## (undated) DEVICE — CANNULA SEAL

## (undated) DEVICE — FRCP BX RADJAW4 NDL 2.8 240CM LG OG BX40

## (undated) DEVICE — SUT GUT CHRM 4/0 RB1 27IN U203H

## (undated) DEVICE — SUT SILK 2/0 FS BLK 18IN 685G

## (undated) DEVICE — CODMAN® SURGICAL PATTIES 1/2" X 3" (1.27CM X 7.62CM): Brand: CODMAN®

## (undated) DEVICE — CONTAINER,SPECIMEN,OR STERILE,4OZ: Brand: MEDLINE

## (undated) DEVICE — LAPAROSCOPIC SMOKE FILTRATION SYSTEM: Brand: PALL LAPAROSHIELD® PLUS LAPAROSCOPIC SMOKE FILTRATION SYSTEM

## (undated) DEVICE — SHEET, DRAPE, SPLIT, STERILE: Brand: MEDLINE

## (undated) DEVICE — SOL ANTISTICK CAUTRY ELECTROLUBE LF

## (undated) DEVICE — SUT NLY 2/0 664G

## (undated) DEVICE — KT ANTI FOG W/FLD AND SPNG

## (undated) DEVICE — GOWN,AURORA,NOREINF,RAGLAN,XL,STERILE: Brand: MEDLINE

## (undated) DEVICE — TRAP FLD MINIVAC MEGADYNE 100ML

## (undated) DEVICE — COAGULATOR SXN FTSWTCH 10F6IN

## (undated) DEVICE — TBG SXN UNIV CONN/SCALLOP/FEM 1/4IN 20FT STRL

## (undated) DEVICE — GLV SURG BIOGEL LTX PF 8 1/2

## (undated) DEVICE — SUREFIT, DUAL DISPERSIVE ELECTRODE, CONTACT QUALITY MONITOR: Brand: SUREFIT

## (undated) DEVICE — SYR CONTRL LUERLOK 10CC

## (undated) DEVICE — TIP COVER ACCESSORY

## (undated) DEVICE — LOU MINOR PROCEDURE: Brand: MEDLINE INDUSTRIES, INC.

## (undated) DEVICE — SUT GUT PLN 4/0 SC1 18IN 1824H

## (undated) DEVICE — KT ORCA ORCAPOD DISP STRL

## (undated) DEVICE — REDUCER: Brand: ENDOWRIST

## (undated) DEVICE — COVER,MAYO STAND,STERILE: Brand: MEDLINE

## (undated) DEVICE — GLV SURG BIOGEL LTX PF 7 1/2

## (undated) DEVICE — APPL CHLORAPREP HI/LITE 26ML ORNG

## (undated) DEVICE — ADAPT CLN BIOGUARD AIR/H2O DISP

## (undated) DEVICE — 3M™ STERI-STRIP™ COMPOUND BENZOIN TINCTURE 40 BAGS/CARTON 4 CARTONS/CASE C1544: Brand: 3M™ STERI-STRIP™

## (undated) DEVICE — DRSNG TELFA PAD NONADH STR 1S 3X8IN

## (undated) DEVICE — BNDG ELAS CO-FLEX SLF ADHR 6IN 5YD LF STRL

## (undated) DEVICE — COLUMN DRAPE

## (undated) DEVICE — SUT MNCRYL PLS ANTIB UD 4/0 PS2 18IN

## (undated) DEVICE — GLV SURG TRIUMPH PF LTX 6 STRL

## (undated) DEVICE — LN SMPL CO2 SHTRM SD STREAM W/M LUER

## (undated) DEVICE — LOU GENERAL ROBOT: Brand: MEDLINE INDUSTRIES, INC.

## (undated) DEVICE — Device: Brand: FABCO

## (undated) DEVICE — BLADELESS OBTURATOR: Brand: WECK VISTA

## (undated) DEVICE — UNDYED BRAIDED (POLYGLACTIN 910), SYNTHETIC ABSORBABLE SUTURE: Brand: COATED VICRYL

## (undated) DEVICE — DRAPE,REIN 53X77,STERILE: Brand: MEDLINE

## (undated) DEVICE — DRAPE,U/ SHT,SPLIT,PLAS,STERIL: Brand: MEDLINE

## (undated) DEVICE — PENCL E/S ULTRAVAC TELESCP NOSE HOLSTR 10FT

## (undated) DEVICE — DRESSING 440402 MEROCEL 10PK STD 8CM: Brand: MEROCEL

## (undated) DEVICE — WIPE INST MEROCEL

## (undated) DEVICE — BITEBLOCK OMNI BLOC

## (undated) DEVICE — VIOLET BRAIDED (POLYGLACTIN 910), SYNTHETIC ABSORBABLE SUTURE: Brand: COATED VICRYL

## (undated) DEVICE — TUBING, SUCTION, 1/4" X 10', STRAIGHT: Brand: MEDLINE

## (undated) DEVICE — 30977 SEE SHARP - ENHANCED INTRAOPERATIVE LAPAROSCOPE CLEANING & DEFOGGING: Brand: 30977 SEE SHARP - ENHANCED INTRAOPERATIVE LAPAROSCOPE CLEANING & DEFOGGING

## (undated) DEVICE — PK MYTNGTMY 46

## (undated) DEVICE — 3M™ IOBAN™ 2 ANTIMICROBIAL INCISE DRAPE 6640EZ: Brand: IOBAN™ 2

## (undated) DEVICE — 3M™ STERI-STRIP™ REINFORCED ADHESIVE SKIN CLOSURES, R1547, 1/2 IN X 4 IN (12 MM X 100 MM), 6 STRIPS/ENVELOPE: Brand: 3M™ STERI-STRIP™

## (undated) DEVICE — X-RAY DETECTABLE SPONGES,16 PLY: Brand: VISTEC

## (undated) DEVICE — GLV SURG SENSICARE PI LF PF 8 GRN STRL

## (undated) DEVICE — MARKR SKIN W/RULR AND LBL

## (undated) DEVICE — 3M™ STERI-DRAPE™ U-DRAPE 1015: Brand: STERI-DRAPE™

## (undated) DEVICE — ADHS LIQ MASTISOL 2/3ML

## (undated) DEVICE — SENSR O2 OXIMAX FNGR A/ 18IN NONSTR

## (undated) DEVICE — PK ENT 46

## (undated) DEVICE — STERILE POLYISOPRENE POWDER-FREE SURGICAL GLOVES: Brand: PROTEXIS

## (undated) DEVICE — STCKNT IMPERV 12IN STRL

## (undated) DEVICE — INTENDED FOR TISSUE SEPARATION, AND OTHER PROCEDURES THAT REQUIRE A SHARP SURGICAL BLADE TO PUNCTURE OR CUT.: Brand: BARD-PARKER ® CARBON RIB-BACK BLADES

## (undated) DEVICE — SPNG GZ WOVN 4X4IN 12PLY 10/BX STRL

## (undated) DEVICE — CANN O2 ETCO2 FITS ALL CONN CO2 SMPL A/ 7IN DISP LF